# Patient Record
Sex: MALE | NOT HISPANIC OR LATINO | Employment: OTHER | URBAN - NONMETROPOLITAN AREA
[De-identification: names, ages, dates, MRNs, and addresses within clinical notes are randomized per-mention and may not be internally consistent; named-entity substitution may affect disease eponyms.]

---

## 2022-03-25 DIAGNOSIS — B37.0 ORAL THRUSH: ICD-10-CM

## 2022-03-25 DIAGNOSIS — J44.1 COPD WITH EXACERBATION: ICD-10-CM

## 2022-03-25 RX ORDER — FLUCONAZOLE 150 MG/1
150 TABLET ORAL EVERY OTHER DAY
Qty: 3 TABLET | Refills: 0 | Status: SHIPPED | OUTPATIENT
Start: 2022-03-25 | End: 2022-03-25

## 2022-03-25 RX ORDER — PREDNISONE 20 MG/1
20 TABLET ORAL DAILY
Qty: 10 TABLET | Refills: 0 | Status: SHIPPED | OUTPATIENT
Start: 2022-03-25 | End: 2022-05-11 | Stop reason: SDUPTHER

## 2022-04-01 NOTE — TELEPHONE ENCOUNTER
Incoming Refill Request    Fluconazole  Medication requested (name and dose): Fluconazole 2 TABLETS     PREDNIZONE 20MG 10 TABLET    Pharmacy where request should be sent: [facility]    Additional details provided by patient: PATIENT SAID THAT HE ONLY RECEIVE ONE OF HIS FLUCONAZOLE AND HE TOOK IT ON THE 25TH. HE NEEDS TWO MORE.     HE ALSO SAID THAT HE DID NOT RECEIVE HIS PREDNISONE 20MG. HE SAID HE IS GOING TO CHECK WITH THE PHARMACY NOW.    Best call back number: [phone]    Does the patient have less than a 3 day supply:  [x] Yes  [] No    January Desai Rep  04/01/22, 15:56 EDT

## 2022-04-04 RX ORDER — FLUCONAZOLE 150 MG/1
150 TABLET ORAL ONCE
Qty: 3 TABLET | Refills: 0 | Status: SHIPPED | OUTPATIENT
Start: 2022-04-04 | End: 2022-04-04

## 2022-04-04 RX ORDER — PREDNISONE 20 MG/1
20 TABLET ORAL DAILY
Qty: 10 TABLET | Refills: 0 | Status: SHIPPED | OUTPATIENT
Start: 2022-04-04 | End: 2022-04-08 | Stop reason: SDUPTHER

## 2022-04-04 NOTE — TELEPHONE ENCOUNTER
Rx Refill Note    Requested Prescriptions     Pending Prescriptions Disp Refills   • predniSONE (DELTASONE) 20 MG tablet 10 tablet 0     Sig: Take 1 tablet by mouth Daily.   • fluconazole (DIFLUCAN) 150 MG tablet 3 tablet 0     Sig: Take 1 tablet by mouth 1 (One) Time for 1 dose.        Last office visit with prescribing clinician: per blpojhl48/08/21     Next office visit with prescribing clinician: 4/8/2022   Last labs: 03/19/21  Last refill: 02/10/22   Pharmacy (be sure to add in Epic). correct

## 2022-04-08 ENCOUNTER — OFFICE VISIT (OUTPATIENT)
Dept: FAMILY MEDICINE CLINIC | Facility: CLINIC | Age: 67
End: 2022-04-08

## 2022-04-08 VITALS
DIASTOLIC BLOOD PRESSURE: 84 MMHG | WEIGHT: 156.6 LBS | OXYGEN SATURATION: 99 % | HEIGHT: 60 IN | HEART RATE: 86 BPM | SYSTOLIC BLOOD PRESSURE: 134 MMHG | BODY MASS INDEX: 30.74 KG/M2

## 2022-04-08 DIAGNOSIS — R09.02 HYPOXIA: ICD-10-CM

## 2022-04-08 DIAGNOSIS — I10 PRIMARY HYPERTENSION: ICD-10-CM

## 2022-04-08 DIAGNOSIS — K80.20 CALCULUS OF GALLBLADDER WITHOUT CHOLECYSTITIS WITHOUT OBSTRUCTION: ICD-10-CM

## 2022-04-08 DIAGNOSIS — E11.9 TYPE 2 DIABETES MELLITUS WITHOUT COMPLICATION, WITHOUT LONG-TERM CURRENT USE OF INSULIN: ICD-10-CM

## 2022-04-08 DIAGNOSIS — N28.9 RENAL LESION: ICD-10-CM

## 2022-04-08 DIAGNOSIS — Z00.01 ENCOUNTER FOR PREVENTATIVE ADULT HEALTH CARE EXAM WITH ABNORMAL FINDINGS: Primary | ICD-10-CM

## 2022-04-08 DIAGNOSIS — I25.10 CORONARY ARTERY DISEASE INVOLVING NATIVE CORONARY ARTERY OF NATIVE HEART WITHOUT ANGINA PECTORIS: ICD-10-CM

## 2022-04-08 DIAGNOSIS — J43.9 PULMONARY EMPHYSEMA, UNSPECIFIED EMPHYSEMA TYPE: ICD-10-CM

## 2022-04-08 DIAGNOSIS — Z11.59 ENCOUNTER FOR HCV SCREENING TEST FOR LOW RISK PATIENT: ICD-10-CM

## 2022-04-08 PROCEDURE — 99215 OFFICE O/P EST HI 40 MIN: CPT | Performed by: FAMILY MEDICINE

## 2022-04-08 PROCEDURE — 36415 COLL VENOUS BLD VENIPUNCTURE: CPT | Performed by: FAMILY MEDICINE

## 2022-04-08 RX ORDER — ALBUTEROL SULFATE 90 UG/1
AEROSOL, METERED RESPIRATORY (INHALATION)
COMMUNITY
Start: 2022-04-01 | End: 2023-02-17 | Stop reason: SDUPTHER

## 2022-04-08 RX ORDER — GLIPIZIDE 5 MG/1
1 TABLET, FILM COATED, EXTENDED RELEASE ORAL
COMMUNITY
Start: 2022-02-28 | End: 2022-04-08 | Stop reason: SDUPTHER

## 2022-04-08 RX ORDER — FLUCONAZOLE 150 MG/1
TABLET ORAL
COMMUNITY
Start: 2022-04-04 | End: 2022-05-11 | Stop reason: SDUPTHER

## 2022-04-08 RX ORDER — DILTIAZEM HYDROCHLORIDE 240 MG/1
CAPSULE, COATED, EXTENDED RELEASE ORAL
COMMUNITY
Start: 2022-04-01 | End: 2022-11-14 | Stop reason: SDUPTHER

## 2022-04-08 RX ORDER — LOSARTAN POTASSIUM 100 MG/1
TABLET ORAL
COMMUNITY
Start: 2022-04-01 | End: 2022-04-08 | Stop reason: SDUPTHER

## 2022-04-08 RX ORDER — GLIPIZIDE 5 MG/1
5 TABLET, FILM COATED, EXTENDED RELEASE ORAL
Qty: 30 TABLET | Refills: 4 | Status: SHIPPED | OUTPATIENT
Start: 2022-04-08 | End: 2022-09-23

## 2022-04-08 RX ORDER — BUDESONIDE AND FORMOTEROL FUMARATE DIHYDRATE 160; 4.5 UG/1; UG/1
AEROSOL RESPIRATORY (INHALATION)
COMMUNITY
Start: 2022-03-09 | End: 2023-02-17 | Stop reason: SDUPTHER

## 2022-04-08 RX ORDER — LOSARTAN POTASSIUM 100 MG/1
100 TABLET ORAL DAILY
Qty: 30 TABLET | Refills: 3 | Status: SHIPPED | OUTPATIENT
Start: 2022-04-08 | End: 2022-10-25

## 2022-04-08 NOTE — PROGRESS NOTES
Answers for HPI/ROS submitted by the patient on 4/1/2022  What is the primary reason for your visit?: Diabetes    Chief Complaint  Follow-up and Fatigue    Subjective          Iker Barber presents to [facility] for preventative yearly exam.   Patient is a 87-year-old male with stage III/end-stage COPD is chronically on about 3 L of oxygen via nasal cannula he follows with Dr. Lamar from a pulmonology standpoint.  They did do a recent CT of the chest showed stable lung status but did show incidental evidence of gallstones in the right upper quadrant.  He did have a lesion in the kidney which seem to be stable or slightly increased in size.  Patient is continue to have a hard time from respiration standpoint he did smoke for 40 years and is likely having his COPD worsening from his history.  He uses his daily inhalers with his albuterol inhaler as needed and nebulizer machine.  He had been doing really well on the asetazeline that pulmonology had prescribed to him however due to insurance not covering this he was unable to get any more.  He notes that his breathing is the hardest when he first wakes up in the morning because he is unable to get a lot of the congestion and mucus out once he gets moving throughout the day this seems to slowly improve.    Otherwise he has put off a lot of his screening studies due to how bad his COPD and his breathing are.    Diabetes  He has type 2 diabetes mellitus. No MedicAlert identification noted. The initial diagnosis of diabetes was made 2011 years ago. Associated symptoms include polyuria and weakness. Symptoms are stable. Risk factors for coronary artery disease include hypertension. Current diabetic treatment includes oral agent (monotherapy). He is compliant with treatment all of the time. He has not had a previous visit with a dietitian. He monitors blood glucose at home 1-2 x per week. Blood glucose monitoring compliance is good. He  "does not see a podiatrist.Eye exam is not current.       Objective   Vital Signs:   /84   Pulse 86   Ht 59 cm (23.23\")   Wt 71 kg (156 lb 9.6 oz)   SpO2 99%   .07 kg/m²     Body mass index is 204.07 kg/m².    Predictive Model Details   No score data available for Risk of Fall        PHQ-9 Depression Screening  Little interest or pleasure in doing things? 0-->not at all   Feeling down, depressed, or hopeless? 0-->not at all   Trouble falling or staying asleep, or sleeping too much?     Feeling tired or having little energy?     Poor appetite or overeating?     Feeling bad about yourself - or that you are a failure or have let yourself or your family down?     Trouble concentrating on things, such as reading the newspaper or watching television?     Moving or speaking so slowly that other people could have noticed? Or the opposite - being so fidgety or restless that you have been moving around a lot more than usual?     Thoughts that you would be better off dead, or of hurting yourself in some way?     PHQ-9 Total Score 0   If you checked off any problems, how difficult have these problems made it for you to do your work, take care of things at home, or get along with other people?         There is no immunization history on file for this patient.    Review of Systems   Respiratory: Positive for cough, shortness of breath and wheezing.    Endocrine: Positive for polyuria.   Neurological: Positive for weakness.       Past History:  Medical History: has a past medical history of Anxiety, Arthritis, Asthma, Diabetes ([facility]), Elevated blood pressure reading, Emphysema, unspecified ([facility]), Esophageal reflux, Hearing problem, Heart disease, Hypercholesterolemia, Irritable bowel syndrome (IBS), Peripheral artery disease ([facility]), Skin disorder, and Stroke (cerebrum) ([facility]).   Surgical History: has no past surgical history on file.   Family History: family history includes Allergies in his mother; Alzheimer's " disease in his mother; Diabetes in his brother, father, and mother.   Social History: reports that he quit smoking about 9 years ago. His smoking use included cigarettes. He started smoking about 49 years ago. He quit after 40.00 years of use. He has never used smokeless tobacco. He reports previous alcohol use. He reports that he does not use drugs.      Current Outpatient Medications:   •  albuterol sulfate  (90 Base) MCG/ACT inhaler, , Disp: , Rfl:   •  dilTIAZem CD (CARDIZEM CD) 240 MG 24 hr capsule, , Disp: , Rfl:   •  fluconazole (DIFLUCAN) 150 MG tablet, , Disp: , Rfl:   •  glipizide (GLUCOTROL XL) 5 MG ER tablet, Take 1 tablet by mouth Daily With Breakfast., Disp: 30 tablet, Rfl: 4  •  Incruse Ellipta 62.5 MCG/INH aerosol powder , , Disp: , Rfl:   •  losartan (COZAAR) 100 MG tablet, Take 1 tablet by mouth Daily., Disp: 30 tablet, Rfl: 3  •  predniSONE (DELTASONE) 20 MG tablet, Take 1 tablet by mouth Daily., Disp: 10 tablet, Rfl: 0  •  Symbicort 160-4.5 MCG/ACT inhaler, , Disp: , Rfl:     Allergies: Patient has no known allergies.    Physical Exam  Vitals (Resting comfortably on 3 L of oxygen via nasal cannula) reviewed.   Constitutional:       Appearance: Normal appearance. He is normal weight.   HENT:      Head: Normocephalic and atraumatic.   Eyes:      Pupils: Pupils are equal, round, and reactive to light.   Cardiovascular:      Rate and Rhythm: Normal rate and regular rhythm.      Pulses: Normal pulses.      Heart sounds: Normal heart sounds.   Pulmonary:      Effort: Pulmonary effort is normal.   Musculoskeletal:      Cervical back: Normal range of motion and neck supple.   Skin:     General: Skin is warm and dry.   Neurological:      General: No focal deficit present.      Mental Status: He is alert. Mental status is at baseline.   Psychiatric:         Mood and Affect: Mood normal.         Behavior: Behavior normal.         Thought Content: Thought content normal.         Judgment: Judgment  normal.          Result Review :              Counseling/anticipatory guidance:  Patient has been counseled on nutrition, [place] planning/contraception, physical activity, health and weight, injury prevention, misuse of tobacco, alcohol and drugs, sexual behavior, dental health, mental health, immunizations and screening.  Patient has been given counseling and guidance on the importance of breast cancer and self breast exams.     Assessment and Plan    Diagnoses and all orders for this visit:    1. Encounter for preventative adult health care exam with abnormal findings (Primary)  We will get basic blood work and follow-up he does not want to pursue any screening studies at this time due to his underlying conditions  -     CBC Auto Differential; Future  -     CK; Future  -     Comprehensive Metabolic Panel; Future  -     Hemoglobin A1c; Future  -     Lipid Panel; Future  -     Lipid Panel  -     Hemoglobin A1c  -     Comprehensive Metabolic Panel  -     CK  -     CBC Auto Differential    2. Pulmonary emphysema, unspecified emphysema type (HCC)  -For now COPD is where it has been in the past.  His oxygen requirements have not increased I recommend he continue to follow along with pulmonology with his current inhaler use.  We will continue to follow along for as needed as needed flareups.    3. Type 2 diabetes mellitus without complication, without long-term current use of insulin (HCC)  Comments:  Stable for now continue meds we will recheck A1c    4. Primary hypertension  Comments:  We will continue meds and monitoring      5. Hypoxia  Stable continue to wear nasal cannula oxygen    6. Coronary artery disease involving native coronary artery of native heart without angina pectoris  He is currently only on aspirin has discontinued Plavix no recent cardiac symptoms noted.    7. Encounter for HCV screening test for low risk patient  -     Hepatitis C antibody; Future  -     Hepatitis C antibody    8. Renal  lesion  Comments:  Stable renal lesion seen on recent CT scan    9. Calculus of gallbladder without cholecystitis without obstruction  This was noted to be an incidental finding on a recent CT.  He notes no abdominal pain with no nausea or vomiting.  We have had a discussion with the gallstones daily solution really is removal however he is high risk and they were unable to extubate him if he was intubated for surgery he has been told to watch for risk factors and symptoms of possible acute cholecystitis which would be a surgical emergency.  Otherwise diet provided.    Other orders  -     glipizide (GLUCOTROL XL) 5 MG ER tablet; Take 1 tablet by mouth Daily With Breakfast.  Dispense: 30 tablet; Refill: 4  -     losartan (COZAAR) 100 MG tablet; Take 1 tablet by mouth Daily.  Dispense: 30 tablet; Refill: 3    -On a CT abdomen pelvis done on 4/20/2021 there is an indeterminate lesion within the right kidney measuring 14 mm at that time due to his other health issues we did not pursue CT contrast with renal protocol.Answers for HPI/ROS submitted by the patient on 4/1/2022  What is the primary reason for your visit?: Diabetes      MEDICATION SIDE EFFECTS, RISKS AND SIDE EFFECTS HAVE BEEN DISCUSSED WITH PATIENT. PATIENT NOTES UNDERSTANDING. IF ANY CONCERN OR QUESTION REGARDING MEDICATION PLEASE CONTACT.     I spent 60 minutes caring for Iker on this date of service. This time includes time spent by me in the following activities:preparing for the visit, reviewing tests, obtaining and/or reviewing a separately obtained history, ordering medications, tests, or procedures, documenting information in the medical record, independently interpreting results and communicating that information with the patient/family/caregiver and care coordination     Follow Up   Return in about 6 months (around 10/8/2022).  Patient was given instructions and counseling regarding his condition or for health maintenance advice. Please see  specific information pulled into the AVS if appropriate.     Jody Gaspar DO

## 2022-04-09 LAB
ALBUMIN SERPL-MCNC: 4.7 G/DL (ref 3.8–4.8)
ALBUMIN/GLOB SERPL: 2 {RATIO} (ref 1.2–2.2)
ALP SERPL-CCNC: 96 IU/L (ref 44–121)
ALT SERPL-CCNC: 24 IU/L (ref 0–44)
AST SERPL-CCNC: 21 IU/L (ref 0–40)
BASOPHILS # BLD AUTO: 0.1 X10E3/UL (ref 0–0.2)
BASOPHILS NFR BLD AUTO: 1 %
BILIRUB SERPL-MCNC: 0.2 MG/DL (ref 0–1.2)
BUN SERPL-MCNC: 17 MG/DL (ref 8–27)
BUN/CREAT SERPL: 19 (ref 10–24)
CALCIUM SERPL-MCNC: 9.5 MG/DL (ref 8.6–10.2)
CHLORIDE SERPL-SCNC: 102 MMOL/L (ref 96–106)
CHOLEST SERPL-MCNC: 211 MG/DL (ref 100–199)
CK SERPL-CCNC: 152 U/L (ref 41–331)
CO2 SERPL-SCNC: 27 MMOL/L (ref 20–29)
CREAT SERPL-MCNC: 0.88 MG/DL (ref 0.76–1.27)
EGFRCR SERPLBLD CKD-EPI 2021: 94 ML/MIN/1.73
EOSINOPHIL # BLD AUTO: 0 X10E3/UL (ref 0–0.4)
EOSINOPHIL NFR BLD AUTO: 0 %
ERYTHROCYTE [DISTWIDTH] IN BLOOD BY AUTOMATED COUNT: 11.9 % (ref 11.6–15.4)
GLOBULIN SER CALC-MCNC: 2.3 G/DL (ref 1.5–4.5)
GLUCOSE SERPL-MCNC: 91 MG/DL (ref 65–99)
HBA1C MFR BLD: 7.1 % (ref 4.8–5.6)
HCT VFR BLD AUTO: 43.7 % (ref 37.5–51)
HCV AB S/CO SERPL IA: <0.1 S/CO RATIO (ref 0–0.9)
HDLC SERPL-MCNC: 57 MG/DL
HGB BLD-MCNC: 15.8 G/DL (ref 13–17.7)
IMM GRANULOCYTES # BLD AUTO: 0 X10E3/UL (ref 0–0.1)
IMM GRANULOCYTES NFR BLD AUTO: 0 %
LDLC SERPL CALC-MCNC: 102 MG/DL (ref 0–99)
LYMPHOCYTES # BLD AUTO: 2.3 X10E3/UL (ref 0.7–3.1)
LYMPHOCYTES NFR BLD AUTO: 23 %
MCH RBC QN AUTO: 34.8 PG (ref 26.6–33)
MCHC RBC AUTO-ENTMCNC: 36.2 G/DL (ref 31.5–35.7)
MCV RBC AUTO: 96 FL (ref 79–97)
MONOCYTES # BLD AUTO: 0.8 X10E3/UL (ref 0.1–0.9)
MONOCYTES NFR BLD AUTO: 8 %
NEUTROPHILS # BLD AUTO: 6.9 X10E3/UL (ref 1.4–7)
NEUTROPHILS NFR BLD AUTO: 68 %
PLATELET # BLD AUTO: 280 X10E3/UL (ref 150–450)
POTASSIUM SERPL-SCNC: 3.9 MMOL/L (ref 3.5–5.2)
PROT SERPL-MCNC: 7 G/DL (ref 6–8.5)
RBC # BLD AUTO: 4.54 X10E6/UL (ref 4.14–5.8)
SODIUM SERPL-SCNC: 144 MMOL/L (ref 134–144)
TRIGL SERPL-MCNC: 307 MG/DL (ref 0–149)
VLDLC SERPL CALC-MCNC: 52 MG/DL (ref 5–40)
WBC # BLD AUTO: 10.1 X10E3/UL (ref 3.4–10.8)

## 2022-05-11 DIAGNOSIS — J44.1 COPD WITH EXACERBATION: ICD-10-CM

## 2022-05-11 RX ORDER — PREDNISONE 20 MG/1
20 TABLET ORAL DAILY
Qty: 10 TABLET | Refills: 0 | Status: SHIPPED | OUTPATIENT
Start: 2022-05-11 | End: 2022-05-11

## 2022-05-11 RX ORDER — FLUCONAZOLE 150 MG/1
150 TABLET ORAL DAILY
Qty: 3 TABLET | Refills: 2 | Status: SHIPPED | OUTPATIENT
Start: 2022-05-11 | End: 2022-07-11 | Stop reason: SDUPTHER

## 2022-05-11 NOTE — TELEPHONE ENCOUNTER
Rx Refill Note    Requested Prescriptions     Pending Prescriptions Disp Refills   • predniSONE (DELTASONE) 20 MG tablet [Pharmacy Med Name: predniSONE 20 MG TABLET] 10 tablet 0     Sig: TAKE ONE TABLET BY MOUTH DAILY        Last office visit with prescribing clinician: 4/8/2022      Next office visit with prescribing clinician: Visit date not found   Last labs:   Last refill: appears it was printed instead of escribed?   Pharmacy (be sure to add in Epic). correct

## 2022-05-12 RX ORDER — PREDNISONE 20 MG/1
TABLET ORAL
Qty: 10 TABLET | Refills: 0 | Status: SHIPPED | OUTPATIENT
Start: 2022-05-12 | End: 2022-06-14

## 2022-06-13 DIAGNOSIS — J44.1 COPD WITH EXACERBATION: ICD-10-CM

## 2022-06-14 RX ORDER — PREDNISONE 20 MG/1
TABLET ORAL
Qty: 10 TABLET | Refills: 0 | Status: SHIPPED | OUTPATIENT
Start: 2022-06-14 | End: 2022-07-11 | Stop reason: SDUPTHER

## 2022-06-14 NOTE — TELEPHONE ENCOUNTER
Rx Refill Note    Requested Prescriptions     Pending Prescriptions Disp Refills   • predniSONE (DELTASONE) 20 MG tablet [Pharmacy Med Name: predniSONE 20 MG TABLET] 10 tablet 0     Sig: TAKE ONE TABLET BY MOUTH DAILY        Last office visit with prescribing clinician: 4/8/2022      Next office visit with prescribing clinician: Visit date not found   Last labs:   Last refill: 05/10/2022   Pharmacy (be sure to add in Epic). correct

## 2022-07-05 ENCOUNTER — TELEPHONE (OUTPATIENT)
Dept: FAMILY MEDICINE CLINIC | Facility: CLINIC | Age: 67
End: 2022-07-05

## 2022-07-05 NOTE — TELEPHONE ENCOUNTER
Pt's wife called regis her  would be squeezed into Dr. Gaspar's schedule if he was ill. She has no openings this week. So I told her I would Message her.   [phone]

## 2022-07-05 NOTE — TELEPHONE ENCOUNTER
We can get patient from my schedule any day this week we will book him it is important to get him he has end-stage COPD

## 2022-07-11 ENCOUNTER — OFFICE VISIT (OUTPATIENT)
Dept: FAMILY MEDICINE CLINIC | Facility: CLINIC | Age: 67
End: 2022-07-11

## 2022-07-11 VITALS
DIASTOLIC BLOOD PRESSURE: 78 MMHG | HEART RATE: 88 BPM | WEIGHT: 149.8 LBS | BODY MASS INDEX: 29.41 KG/M2 | OXYGEN SATURATION: 95 % | SYSTOLIC BLOOD PRESSURE: 120 MMHG | HEIGHT: 60 IN

## 2022-07-11 DIAGNOSIS — J44.9 END STAGE COPD: Primary | ICD-10-CM

## 2022-07-11 DIAGNOSIS — J44.1 COPD WITH EXACERBATION: ICD-10-CM

## 2022-07-11 DIAGNOSIS — R60.0 BILATERAL LOWER EXTREMITY EDEMA: ICD-10-CM

## 2022-07-11 PROCEDURE — 99214 OFFICE O/P EST MOD 30 MIN: CPT | Performed by: FAMILY MEDICINE

## 2022-07-11 RX ORDER — AZITHROMYCIN 250 MG/1
TABLET, FILM COATED ORAL
COMMUNITY
Start: 2022-05-23 | End: 2023-02-17

## 2022-07-11 RX ORDER — PREDNISONE 20 MG/1
20 TABLET ORAL DAILY
Qty: 30 TABLET | Refills: 0 | Status: SHIPPED | OUTPATIENT
Start: 2022-07-11 | End: 2022-08-10

## 2022-07-11 RX ORDER — FUROSEMIDE 40 MG/1
40 TABLET ORAL DAILY
Qty: 30 TABLET | Refills: 0 | Status: SHIPPED | OUTPATIENT
Start: 2022-07-11 | End: 2022-08-08

## 2022-07-11 RX ORDER — FLUCONAZOLE 150 MG/1
150 TABLET ORAL DAILY
Qty: 3 TABLET | Refills: 2 | Status: SHIPPED | OUTPATIENT
Start: 2022-07-11 | End: 2022-09-29

## 2022-07-11 RX ORDER — POTASSIUM CHLORIDE 750 MG/1
10 TABLET, EXTENDED RELEASE ORAL DAILY
Qty: 30 TABLET | Refills: 2 | Status: SHIPPED | OUTPATIENT
Start: 2022-07-11 | End: 2022-10-07

## 2022-07-11 NOTE — PROGRESS NOTES
"Chief Complaint  Edema and COPD    Subjective          Iker Barber presents to [facility]  Patient is a 67-year-old male with end-stage COPD currently on 3 to 4 L of oxygen at all times.  He does follow appropriately with the pulmonologist however they basically told him that there is not much more they can do for him from a lung standpoint.  He was previously on a asetazline for his lungs this is really helping him as an expectorant however they have significantly marked up the price and he is not able to afford it now.  He still has a vibrating chest vest to help with cough and congestion.  He is here today because he is sent his trilogy BiPAP machine back as his COPD symptoms seem to get worse with the use of this.  He feels like his respiratory symptoms and his breathing is able to be managed by daily prednisone.  He states that this time the risks of prednisone use versus the benefits of what he states is helping keep him alive it is more valuable to him.  He is requesting consistent prednisone prescription so he can stay on it to help from a wheezing standpoint.    He had also at 1 point been put on Lasix.  It seemed to help him from a bilateral lower extremity edema standpoint he is noticing some increased swelling.  He does not want to pursue anything like an echo he is aware that it could be his pulmonary status causing cardiac issues.  He would like to see if he can get an as needed Lasix to help with some diuretic properties.  He denies any actual chest pain.  Oxygen requirements are about the same.      Objective   Vital Signs:   /78   Pulse 88   Ht 59 cm (23.23\")   Wt 67.9 kg (149 lb 12.8 oz)   SpO2 95%   .21 kg/m²     Body mass index is 195.21 kg/m².    Review of Systems   Constitutional: Negative.    HENT: Negative.    Eyes: Negative.    Respiratory: Negative.    Cardiovascular: Negative.    Gastrointestinal: Negative.    Endocrine: Negative.  "   Genitourinary: Negative.    Musculoskeletal: Negative.    Skin: Negative.    Allergic/Immunologic: Negative.    Neurological: Negative.    Hematological: Negative.    Psychiatric/Behavioral: Negative.        Past History:  Medical History: has a past medical history of Anxiety, Arthritis, Asthma, Diabetes ([facility]), Elevated blood pressure reading, Emphysema, unspecified (HCC), Esophageal reflux, Hearing problem, Heart disease, Hypercholesterolemia, Irritable bowel syndrome (IBS), Peripheral artery disease (HCC), Skin disorder, and Stroke (cerebrum) ([facility]).   Surgical History: has no past surgical history on file.   Family History: family history includes Allergies in his mother; Alzheimer's disease in his mother; Diabetes in his brother, father, and mother.   Social History: reports that he quit smoking about 9 years ago. His smoking use included cigarettes. He started smoking about 49 years ago. He quit after 40.00 years of use. He has never used smokeless tobacco. He reports previous alcohol use. He reports that he does not use drugs.      Current Outpatient Medications:   •  fluconazole (DIFLUCAN) 150 MG tablet, Take 1 tablet by mouth Daily., Disp: 3 tablet, Rfl: 2  •  predniSONE (DELTASONE) 20 MG tablet, Take 1 tablet by mouth Daily., Disp: 30 tablet, Rfl: 0  •  albuterol sulfate  (90 Base) MCG/ACT inhaler, , Disp: , Rfl:   •  azithromycin (ZITHROMAX) 250 MG tablet, , Disp: , Rfl:   •  dilTIAZem CD (CARDIZEM CD) 240 MG 24 hr capsule, , Disp: , Rfl:   •  furosemide (Lasix) 40 MG tablet, Take 1 tablet by mouth Daily., Disp: 30 tablet, Rfl: 0  •  glipizide (GLUCOTROL XL) 5 MG ER tablet, Take 1 tablet by mouth Daily With Breakfast., Disp: 30 tablet, Rfl: 4  •  Incruse Ellipta 62.5 MCG/INH aerosol powder , , Disp: , Rfl:   •  losartan (COZAAR) 100 MG tablet, Take 1 tablet by mouth Daily., Disp: 30 tablet, Rfl: 3  •  potassium chloride (K-DUR,KLOR-CON) 10 MEQ CR tablet, Take 1 tablet by mouth Daily., Disp: 30  tablet, Rfl: 2  •  Symbicort 160-4.5 MCG/ACT inhaler, , Disp: , Rfl:     Allergies: Patient has no known allergies.    Physical Exam  Constitutional:       Appearance: Normal appearance. He is normal weight.   HENT:      Head: Normocephalic and atraumatic.   Eyes:      Pupils: Pupils are equal, round, and reactive to light.   Cardiovascular:      Rate and Rhythm: Normal rate and regular rhythm.      Pulses: Normal pulses.      Heart sounds: Normal heart sounds.   Pulmonary:      Comments: On 3 L of oxygen no evidence of respiratory distress   Musculoskeletal:      Cervical back: Normal range of motion and neck supple.   Skin:     General: Skin is warm and dry.   Neurological:      General: No focal deficit present.      Mental Status: He is alert. Mental status is at baseline.   Psychiatric:         Mood and Affect: Mood normal.         Behavior: Behavior normal.         Thought Content: Thought content normal.         Judgment: Judgment normal.          Result Review :                   Assessment and Plan    Diagnoses and all orders for this visit:    1. End stage COPD (HCC) (Primary)  I have discussed in length with patient at this time he is not wanting to get hospice involved but he notes at some point in the near future he may consider this.  As his pulmonologist is only seeing him every 6 months and is stated there is nothing more he can do for him he states the prednisone daily is the only thing that allows him to breathe I am more than happy to provide this for him along with any other medications he needs to keep him comfortable.      2. COPD with exacerbation (HCC)  -     predniSONE (DELTASONE) 20 MG tablet; Take 1 tablet by mouth Daily.  Dispense: 30 tablet; Refill: 0    3. Bilateral lower extremity edema  I will add the as needed Lasix for him to take he has been told to take this with the potassium of course if any worsening symptoms return for evaluation patient voices full understanding    Other  orders  -     fluconazole (DIFLUCAN) 150 MG tablet; Take 1 tablet by mouth Daily.  Dispense: 3 tablet; Refill: 2  -     furosemide (Lasix) 40 MG tablet; Take 1 tablet by mouth Daily.  Dispense: 30 tablet; Refill: 0  -     potassium chloride (K-DUR,KLOR-CON) 10 MEQ CR tablet; Take 1 tablet by mouth Daily.  Dispense: 30 tablet; Refill: 2      MEDICATION SIDE EFFECTS, RISKS AND SIDE EFFECTS HAVE BEEN DISCUSSED WITH PATIENT. PATIENT NOTES UNDERSTANDING. IF ANY CONCERN OR QUESTION REGARDING MEDICATION PLEASE CONTACT.         Follow Up   No follow-ups on file.  Patient was given instructions and counseling regarding his condition or for health maintenance advice. Please see specific information pulled into the AVS if appropriate.     Jody Gaspar DO

## 2022-08-08 RX ORDER — FUROSEMIDE 40 MG/1
TABLET ORAL
Qty: 30 TABLET | Refills: 0 | Status: SHIPPED | OUTPATIENT
Start: 2022-08-08 | End: 2022-09-06

## 2022-08-08 NOTE — TELEPHONE ENCOUNTER
Rx Refill Note    Requested Prescriptions     Pending Prescriptions Disp Refills   • furosemide (LASIX) 40 MG tablet [Pharmacy Med Name: FUROSEMIDE 40 MG TABLET] 30 tablet 0     Sig: TAKE ONE TABLET BY MOUTH DAILY        Last office visit with prescribing clinician: 7/11/2022      Next office visit with prescribing clinician: Visit date not found   Last labs:   Last refill: 07/11/2022   Pharmacy (be sure to add in Epic). correct

## 2022-08-09 DIAGNOSIS — J44.1 COPD WITH EXACERBATION: ICD-10-CM

## 2022-08-10 RX ORDER — PREDNISONE 20 MG/1
TABLET ORAL
Qty: 30 TABLET | Refills: 0 | Status: SHIPPED | OUTPATIENT
Start: 2022-08-10 | End: 2022-09-06

## 2022-09-05 DIAGNOSIS — J44.1 COPD WITH EXACERBATION: ICD-10-CM

## 2022-09-06 RX ORDER — FUROSEMIDE 40 MG/1
TABLET ORAL
Qty: 30 TABLET | Refills: 0 | Status: SHIPPED | OUTPATIENT
Start: 2022-09-06 | End: 2022-10-04

## 2022-09-06 RX ORDER — PREDNISONE 20 MG/1
TABLET ORAL
Qty: 30 TABLET | Refills: 0 | Status: SHIPPED | OUTPATIENT
Start: 2022-09-06 | End: 2022-10-10

## 2022-09-06 NOTE — TELEPHONE ENCOUNTER
Rx Refill Note    Requested Prescriptions     Pending Prescriptions Disp Refills   • furosemide (LASIX) 40 MG tablet [Pharmacy Med Name: FUROSEMIDE 40 MG TABLET] 30 tablet 0     Sig: TAKE ONE TABLET BY MOUTH DAILY        Last office visit with prescribing clinician: 7/11/2022      Next office visit with prescribing clinician: 9/5/2022   Last labs:   Last refill: 08/08/2022   Pharmacy (be sure to add in Epic). correct

## 2022-09-06 NOTE — TELEPHONE ENCOUNTER
Rx Refill Note    Requested Prescriptions     Pending Prescriptions Disp Refills   • predniSONE (DELTASONE) 20 MG tablet [Pharmacy Med Name: predniSONE 20 MG TABLET] 30 tablet 0     Sig: TAKE ONE TABLET BY MOUTH DAILY        Last office visit with prescribing clinician: 7/11/2022      Next office visit with prescribing clinician: Visit date not found   Last labs:   Last refill: 08/10/2022   Pharmacy (be sure to add in Epic). correct

## 2022-09-23 NOTE — TELEPHONE ENCOUNTER
Rx Refill Note    Requested Prescriptions     Pending Prescriptions Disp Refills   • glipizide (GLUCOTROL XL) 5 MG ER tablet [Pharmacy Med Name: glipiZIDE XL 5 MG TABLET] 30 tablet 1     Sig: TAKE ONE TABLET BY MOUTH DAILY WITH BREAKFAST        Last office visit with prescribing clinician: 7/11/2022      Next office visit with prescribing clinician: Visit date not found   Last labs:   Last refill: 04/08/2022   Pharmacy (be sure to add in Epic). correct

## 2022-09-26 RX ORDER — GLIPIZIDE 5 MG/1
TABLET, FILM COATED, EXTENDED RELEASE ORAL
Qty: 30 TABLET | Refills: 1 | Status: SHIPPED | OUTPATIENT
Start: 2022-09-26 | End: 2023-02-14

## 2022-09-29 RX ORDER — FLUCONAZOLE 150 MG/1
TABLET ORAL
Qty: 3 TABLET | Refills: 0 | Status: SHIPPED | OUTPATIENT
Start: 2022-09-29 | End: 2022-10-24

## 2022-09-29 NOTE — TELEPHONE ENCOUNTER
Rx Refill Note    Requested Prescriptions     Pending Prescriptions Disp Refills   • fluconazole (DIFLUCAN) 150 MG tablet [Pharmacy Med Name: FLUCONAZOLE 150 MG TABLET] 3 tablet 0     Sig: TAKE ONE TABLET BY MOUTH DAILY        Last office visit with prescribing clinician: 7/11/2022      Next office visit with prescribing clinician: Visit date not found   Last labs:   Last refill: 07/11/2022   Pharmacy (be sure to add in Epic). correct

## 2022-10-04 RX ORDER — FUROSEMIDE 40 MG/1
TABLET ORAL
Qty: 30 TABLET | Refills: 0 | Status: SHIPPED | OUTPATIENT
Start: 2022-10-04 | End: 2022-11-14 | Stop reason: SDUPTHER

## 2022-10-04 NOTE — TELEPHONE ENCOUNTER
Rx Refill Note    Requested Prescriptions     Pending Prescriptions Disp Refills   • furosemide (LASIX) 40 MG tablet [Pharmacy Med Name: FUROSEMIDE 40 MG TABLET] 30 tablet 0     Sig: TAKE ONE TABLET BY MOUTH DAILY        Last office visit with prescribing clinician: 7/11/2022      Next office visit with prescribing clinician: Visit date not found   Last labs:   Last refill: 09/06/2022   Pharmacy (be sure to add in Epic). correct

## 2022-10-07 DIAGNOSIS — R60.0 BILATERAL LOWER EXTREMITY EDEMA: Primary | ICD-10-CM

## 2022-10-07 RX ORDER — POTASSIUM CHLORIDE 750 MG/1
TABLET, EXTENDED RELEASE ORAL
Qty: 30 TABLET | Refills: 2 | Status: SHIPPED | OUTPATIENT
Start: 2022-10-07 | End: 2023-01-03

## 2022-10-08 DIAGNOSIS — J44.1 COPD WITH EXACERBATION: ICD-10-CM

## 2022-10-10 RX ORDER — PREDNISONE 20 MG/1
TABLET ORAL
Qty: 30 TABLET | Refills: 0 | Status: SHIPPED | OUTPATIENT
Start: 2022-10-10 | End: 2022-11-08

## 2022-10-24 RX ORDER — FLUCONAZOLE 150 MG/1
TABLET ORAL
Qty: 3 TABLET | Refills: 0 | Status: SHIPPED | OUTPATIENT
Start: 2022-10-24 | End: 2022-11-08

## 2022-10-24 NOTE — TELEPHONE ENCOUNTER
Rx Refill Note    Requested Prescriptions     Pending Prescriptions Disp Refills   • fluconazole (DIFLUCAN) 150 MG tablet [Pharmacy Med Name: FLUCONAZOLE 150 MG TABLET] 3 tablet 0     Sig: TAKE ONE TABLET BY MOUTH DAILY        Last office visit with prescribing clinician: 7/11/2022      Next office visit with prescribing clinician: Visit date not found   Last labs:   Last refill: 09/29/2022   Pharmacy (be sure to add in Epic). correct

## 2022-10-25 DIAGNOSIS — I10 PRIMARY HYPERTENSION: Primary | ICD-10-CM

## 2022-10-26 RX ORDER — LOSARTAN POTASSIUM 100 MG/1
TABLET ORAL
Qty: 90 TABLET | Refills: 0 | Status: SHIPPED | OUTPATIENT
Start: 2022-10-26 | End: 2023-01-23

## 2022-11-08 DIAGNOSIS — J44.1 COPD WITH EXACERBATION: ICD-10-CM

## 2022-11-08 RX ORDER — PREDNISONE 20 MG/1
TABLET ORAL
Qty: 30 TABLET | Refills: 0 | Status: SHIPPED | OUTPATIENT
Start: 2022-11-08 | End: 2022-11-17 | Stop reason: SDUPTHER

## 2022-11-08 RX ORDER — FLUCONAZOLE 150 MG/1
TABLET ORAL
Qty: 3 TABLET | Refills: 0 | Status: SHIPPED | OUTPATIENT
Start: 2022-11-08 | End: 2022-11-17

## 2022-11-08 NOTE — TELEPHONE ENCOUNTER
Rx Refill Note    Requested Prescriptions     Pending Prescriptions Disp Refills   • fluconazole (DIFLUCAN) 150 MG tablet [Pharmacy Med Name: FLUCONAZOLE 150 MG TABLET] 3 tablet 0     Sig: TAKE ONE TABLET BY MOUTH DAILY        Last office visit with prescribing clinician: 7/11/2022      Next office visit with prescribing clinician: Visit date not found   Last labs:   Last refill: 10/24/2022   Pharmacy (be sure to add in Epic). correct

## 2022-11-08 NOTE — TELEPHONE ENCOUNTER
Rx Refill Note    Requested Prescriptions     Pending Prescriptions Disp Refills   • predniSONE (DELTASONE) 20 MG tablet [Pharmacy Med Name: predniSONE 20 MG TABLET] 30 tablet 0     Sig: TAKE ONE TABLET BY MOUTH DAILY        Last office visit with prescribing clinician: 7/11/2022      Next office visit with prescribing clinician: 11/8/2022   Last labs:   Last refill: 10/10/2022   Pharmacy (be sure to add in Epic). correct

## 2022-11-14 NOTE — TELEPHONE ENCOUNTER
Caller: Iker Barber    Relationship: Self    Best call back number: [phone]    Requested Prescriptions:   Requested Prescriptions     Pending Prescriptions Disp Refills   • furosemide (LASIX) 40 MG tablet 30 tablet 0     Sig: Take 1 tablet by mouth Daily.   • dilTIAZem CD (CARDIZEM CD) 240 MG 24 hr capsule          Pharmacy where request should be sent: [facility] [id number] - [place], [place] - [address] AT [address] [facility] [phone] [facility] [phone] FX     Additional details provided by patient:   PATIENT IS COMPLETELY OUT OF MEDICATION AND WOULD LIKE FOR A 90 DAY SUPPLIES ON BOTH MEDICATION TO BE CALLED INTO THE PHARMACY     Does the patient have less than a 3 day supply:  [x] Yes  [] No    January Martin Rep   11/14/22 10:27 EST

## 2022-11-15 RX ORDER — FUROSEMIDE 40 MG/1
40 TABLET ORAL DAILY
Qty: 90 TABLET | Refills: 0 | Status: SHIPPED | OUTPATIENT
Start: 2022-11-15 | End: 2022-11-17 | Stop reason: SDUPTHER

## 2022-11-15 RX ORDER — DILTIAZEM HYDROCHLORIDE 240 MG/1
240 CAPSULE, COATED, EXTENDED RELEASE ORAL DAILY
Qty: 90 CAPSULE | Refills: 0 | Status: SHIPPED | OUTPATIENT
Start: 2022-11-15 | End: 2023-02-14

## 2022-11-17 ENCOUNTER — OFFICE VISIT (OUTPATIENT)
Dept: FAMILY MEDICINE CLINIC | Facility: CLINIC | Age: 67
End: 2022-11-17

## 2022-11-17 VITALS
WEIGHT: 145.2 LBS | HEIGHT: 69 IN | HEART RATE: 96 BPM | DIASTOLIC BLOOD PRESSURE: 70 MMHG | OXYGEN SATURATION: 94 % | BODY MASS INDEX: 21.51 KG/M2 | SYSTOLIC BLOOD PRESSURE: 120 MMHG

## 2022-11-17 DIAGNOSIS — L73.9 FOLLICULITIS: ICD-10-CM

## 2022-11-17 DIAGNOSIS — R06.02 SHORTNESS OF BREATH: ICD-10-CM

## 2022-11-17 DIAGNOSIS — B37.0 ORAL THRUSH: ICD-10-CM

## 2022-11-17 DIAGNOSIS — J98.4 CHRONIC LUNG DISEASE: ICD-10-CM

## 2022-11-17 DIAGNOSIS — J44.1 COPD WITH EXACERBATION: Primary | ICD-10-CM

## 2022-11-17 PROCEDURE — 99214 OFFICE O/P EST MOD 30 MIN: CPT | Performed by: FAMILY MEDICINE

## 2022-11-17 RX ORDER — AMOXICILLIN AND CLAVULANATE POTASSIUM 875; 125 MG/1; MG/1
1 TABLET, FILM COATED ORAL 2 TIMES DAILY
Qty: 14 TABLET | Refills: 0 | Status: SHIPPED | OUTPATIENT
Start: 2022-11-17 | End: 2023-02-17

## 2022-11-17 RX ORDER — FUROSEMIDE 40 MG/1
40 TABLET ORAL DAILY
Qty: 90 TABLET | Refills: 0 | Status: SHIPPED | OUTPATIENT
Start: 2022-11-17 | End: 2023-02-17 | Stop reason: SDUPTHER

## 2022-11-17 RX ORDER — RIVAROXABAN 20 MG/1
TABLET, FILM COATED ORAL
COMMUNITY
Start: 2022-11-04

## 2022-11-17 RX ORDER — AZITHROMYCIN 250 MG/1
TABLET, FILM COATED ORAL
Status: CANCELLED | OUTPATIENT
Start: 2022-11-17

## 2022-11-17 RX ORDER — NITROGLYCERIN 0.4 MG/1
TABLET SUBLINGUAL
COMMUNITY
Start: 2022-10-06 | End: 2023-02-17 | Stop reason: SDUPTHER

## 2022-11-17 RX ORDER — FLUCONAZOLE 150 MG/1
150 TABLET ORAL DAILY
Qty: 5 TABLET | Refills: 5 | Status: SHIPPED | OUTPATIENT
Start: 2022-11-17 | End: 2023-02-17 | Stop reason: SDUPTHER

## 2022-11-17 RX ORDER — PREDNISONE 20 MG/1
20 TABLET ORAL DAILY
Qty: 90 TABLET | Refills: 0 | Status: SHIPPED | OUTPATIENT
Start: 2022-11-17 | End: 2023-02-17 | Stop reason: SDUPTHER

## 2022-11-17 NOTE — PROGRESS NOTES
"Chief Complaint  Diabetes, Hypertension, and Boils    Subjective          Iker Barber presents to [facility]  History of Present Illness  Patient is a 67-year-old male with end-stage COPD currently on 3 L of chronic oxygen use who presents for follow-up examination.  He is already seen pulmonology who was basically told him there is not much more he can have done for his symptoms.  He notes that the intermittent use of prednisone seems to help keep his respiratory symptoms stable amongst the nebulizer solution.    He intermittently gets these boils like folliculitis lesions in the groin area which he is requesting as needed antibiotics for    He also due to his inhaler use gets frequent oral thrush would like to see if we can keep a continuous Diflucan prescription for him as this is the only thing that seems to help       Objective   Vital Signs:   /70 (BP Location: Right arm, Patient Position: Sitting, Cuff Size: Adult)   Pulse 96   Ht 175.3 cm (69\")   Wt 65.9 kg (145 lb 3.2 oz)   SpO2 94%   BMI 21.44 kg/m²     Body mass index is 21.44 kg/m².    Review of Systems   Constitutional: Negative.    HENT: Negative.    Eyes: Negative.    Respiratory: Positive for cough, shortness of breath and wheezing.    Cardiovascular: Negative.    Gastrointestinal: Negative.    Endocrine: Negative.    Genitourinary: Negative.    Musculoskeletal: Negative.    Skin: Positive for skin lesions.   Allergic/Immunologic: Negative.    Neurological: Negative.    Hematological: Negative.    Psychiatric/Behavioral: Negative.        Past History:  Medical History: has a past medical history of Anxiety, Arthritis, Asthma, Diabetes ([facility]), Elevated blood pressure reading, Emphysema, unspecified ([facility]), Esophageal reflux, Hearing problem, Heart disease, Hypercholesterolemia, Irritable bowel syndrome (IBS), Peripheral artery disease ([facility]), Skin disorder, and Stroke (cerebrum) ([facility]).   Surgical History: " has no past surgical history on file.   Family History: family history includes Allergies in his mother; Alzheimer's disease in his mother; Diabetes in his brother, father, and mother.   Social History: reports that he quit smoking about 9 years ago. His smoking use included cigarettes. He started smoking about 49 years ago. He has never used smokeless tobacco. He reports that he does not currently use alcohol. He reports that he does not use drugs.      Current Outpatient Medications:   •  albuterol sulfate  (90 Base) MCG/ACT inhaler, , Disp: , Rfl:   •  azithromycin (ZITHROMAX) 250 MG tablet, , Disp: , Rfl:   •  dilTIAZem CD (CARDIZEM CD) 240 MG 24 hr capsule, Take 1 capsule by mouth Daily., Disp: 90 capsule, Rfl: 0  •  fluconazole (DIFLUCAN) 150 MG tablet, Take 1 tablet by mouth Daily., Disp: 5 tablet, Rfl: 5  •  furosemide (LASIX) 40 MG tablet, Take 1 tablet by mouth Daily., Disp: 90 tablet, Rfl: 0  •  glipizide (GLUCOTROL XL) 5 MG ER tablet, TAKE ONE TABLET BY MOUTH DAILY WITH BREAKFAST, Disp: 30 tablet, Rfl: 1  •  Incruse Ellipta 62.5 MCG/INH aerosol powder , , Disp: , Rfl:   •  losartan (COZAAR) 100 MG tablet, TAKE ONE TABLET BY MOUTH DAILY, Disp: 90 tablet, Rfl: 0  •  nitroglycerin (NITROSTAT) 0.4 MG SL tablet, , Disp: , Rfl:   •  potassium chloride (K-DUR,KLOR-CON) 10 MEQ CR tablet, TAKE ONE TABLET BY MOUTH DAILY, Disp: 30 tablet, Rfl: 2  •  predniSONE (DELTASONE) 20 MG tablet, Take 1 tablet by mouth Daily., Disp: 90 tablet, Rfl: 0  •  Symbicort 160-4.5 MCG/ACT inhaler, , Disp: , Rfl:   •  Xarelto 20 MG tablet, , Disp: , Rfl:   •  amoxicillin-clavulanate (Augmentin) 875-125 MG per tablet, Take 1 tablet by mouth 2 (Two) Times a Day., Disp: 14 tablet, Rfl: 0    Allergies: Patient has no known allergies.    Physical Exam  Vitals reviewed: 2 L of chronic oxygen use via nasal cannula.   Constitutional:       Appearance: Normal appearance. He is normal weight.   HENT:      Head: Normocephalic and  atraumatic.   Eyes:      Pupils: Pupils are equal, round, and reactive to light.   Cardiovascular:      Rate and Rhythm: Normal rate and regular rhythm.      Pulses: Normal pulses.      Heart sounds: Normal heart sounds.   Pulmonary:      Effort: Pulmonary effort is normal.      Breath sounds: Normal breath sounds.   Musculoskeletal:      Cervical back: Normal range of motion and neck supple.   Skin:     General: Skin is warm and dry.   Neurological:      General: No focal deficit present.      Mental Status: He is alert. Mental status is at baseline.   Psychiatric:         Mood and Affect: Mood normal.         Behavior: Behavior normal.         Thought Content: Thought content normal.         Judgment: Judgment normal.          Result Review :                   Assessment and Plan    Diagnoses and all orders for this visit:    1. COPD with exacerbation (HCC) (Primary)  -     predniSONE (DELTASONE) 20 MG tablet; Take 1 tablet by mouth Daily.  Dispense: 90 tablet; Refill: 0  The steroids are the only thing that really keep his respiratory status stable especially with his pulmonologist saying there is not much more they can do for him for now we will continue to follow along as needed    2. Shortness of breath  Supplemental oxygen use his oxygen requirements are about the same which stable respiratory exam like his normal baseline  He is already seen cardiology uses as needed Lasix as well  Stable edema.    3. Folliculitis  He has intermittent folliculitis we will do the Augmentin prescription    4. Oral thrush  Oral Diflucan provided  The nystatin swish and swallow does not really seem to help him    5. Chronic lung disease  As above    Other orders  -     fluconazole (DIFLUCAN) 150 MG tablet; Take 1 tablet by mouth Daily.  Dispense: 5 tablet; Refill: 5  -     furosemide (LASIX) 40 MG tablet; Take 1 tablet by mouth Daily.  Dispense: 90 tablet; Refill: 0    MEDICATION SIDE EFFECTS, RISKS AND SIDE EFFECTS HAVE BEEN  DISCUSSED WITH PATIENT. PATIENT NOTES UNDERSTANDING. IF ANY CONCERN OR QUESTION REGARDING MEDICATION PLEASE CONTACT.       Follow Up   No follow-ups on file.  Patient was given instructions and counseling regarding his condition or for health maintenance advice. Please see specific information pulled into the AVS if appropriate.     Jody Gaspar DO

## 2022-12-09 NOTE — TELEPHONE ENCOUNTER
Rx Refill Note    Requested Prescriptions     Pending Prescriptions Disp Refills   • Accu-Chek Guide test strip [Pharmacy Med Name: ACCU-CHEK GUIDE TEST STRIP] 100 each 2     Sig: USE TO TEST BLOOD SUGAR  DAILY        Last office visit with prescribing clinician: 11/17/2022      Next office visit with prescribing clinician: 2/17/2023   Last labs:   Last refill: needs   Pharmacy (be sure to add in Epic). correct

## 2022-12-11 RX ORDER — BLOOD SUGAR DIAGNOSTIC
STRIP MISCELLANEOUS
Qty: 100 EACH | Refills: 2 | Status: SHIPPED | OUTPATIENT
Start: 2022-12-11 | End: 2023-02-17 | Stop reason: SDUPTHER

## 2022-12-13 ENCOUNTER — TELEPHONE (OUTPATIENT)
Dept: FAMILY MEDICINE CLINIC | Facility: CLINIC | Age: 67
End: 2022-12-13

## 2022-12-13 NOTE — TELEPHONE ENCOUNTER
Patient called in wanting to discuss his bill from [facility]. I had Marcos call [facility] and add some additional dx's. [facility] stated it will take 3-4 weeks to reprocess through his insurance.

## 2022-12-30 ENCOUNTER — TELEPHONE (OUTPATIENT)
Dept: FAMILY MEDICINE CLINIC | Facility: CLINIC | Age: 67
End: 2022-12-30
Payer: MEDICARE

## 2022-12-30 NOTE — TELEPHONE ENCOUNTER
Caller: KATY GONZALEZ    Relationship: Emergency Contact    Best call back number: [phone]     What medication are you requesting:TAMIFLU    What are your current symptoms: NONE, WIFE HAS THE FLU    How long have you been experiencing symptoms:NO SYMPTOMS  If a prescription is needed, what is your preferred pharmacy and phone number: [facility] [id number] - [place], [place] - [address] AT [address] - [phone] [facility] [phone] FX

## 2022-12-30 NOTE — TELEPHONE ENCOUNTER
Caller: KATY GONZALEZ    Relationship: Emergency Contact    Best call back number: [phone]    What is the best time to reach you: NOW    Who are you requesting to speak with (clinical staff, provider,  specific staff member): VIVIANA    Do you know the name of the person who called: WAITING FOR CALL BACK    What was the call regarding: CHECKING STATUS OF MEDICATION. PATIENT STATES SHE IS STILL WAITING FOR CALL BACK FROM Tuesday.     Do you require a callback: UNABLE TO WARM TRANSFER- PLEASE CALL

## 2023-01-02 DIAGNOSIS — R60.0 BILATERAL LOWER EXTREMITY EDEMA: ICD-10-CM

## 2023-01-03 ENCOUNTER — TELEPHONE (OUTPATIENT)
Dept: FAMILY MEDICINE CLINIC | Facility: CLINIC | Age: 68
End: 2023-01-03

## 2023-01-03 ENCOUNTER — OFFICE VISIT (OUTPATIENT)
Dept: FAMILY MEDICINE CLINIC | Facility: CLINIC | Age: 68
End: 2023-01-03
Payer: MEDICARE

## 2023-01-03 VITALS — OXYGEN SATURATION: 92 % | HEART RATE: 56 BPM

## 2023-01-03 DIAGNOSIS — J10.1 INFLUENZA A: Primary | ICD-10-CM

## 2023-01-03 DIAGNOSIS — R05.1 ACUTE COUGH: ICD-10-CM

## 2023-01-03 DIAGNOSIS — J40 BRONCHITIS: ICD-10-CM

## 2023-01-03 DIAGNOSIS — J44.9 COPD MIXED TYPE: ICD-10-CM

## 2023-01-03 LAB
EXPIRATION DATE: ABNORMAL
FLUAV AG UPPER RESP QL IA.RAPID: DETECTED
FLUBV AG UPPER RESP QL IA.RAPID: NOT DETECTED
INTERNAL CONTROL: ABNORMAL
Lab: ABNORMAL
SARS-COV-2 AG UPPER RESP QL IA.RAPID: NOT DETECTED

## 2023-01-03 PROCEDURE — 87428 SARSCOV & INF VIR A&B AG IA: CPT | Performed by: FAMILY MEDICINE

## 2023-01-03 PROCEDURE — 99214 OFFICE O/P EST MOD 30 MIN: CPT | Performed by: FAMILY MEDICINE

## 2023-01-03 RX ORDER — LEVOFLOXACIN 500 MG/1
500 TABLET, FILM COATED ORAL DAILY
Qty: 7 TABLET | Refills: 0 | Status: SHIPPED | OUTPATIENT
Start: 2023-01-03 | End: 2023-02-17 | Stop reason: SDUPTHER

## 2023-01-03 RX ORDER — OSELTAMIVIR PHOSPHATE 75 MG/1
75 CAPSULE ORAL 2 TIMES DAILY
Qty: 10 CAPSULE | Refills: 0 | Status: SHIPPED | OUTPATIENT
Start: 2023-01-03 | End: 2023-02-17

## 2023-01-03 RX ORDER — BENZONATATE 100 MG/1
100 CAPSULE ORAL 3 TIMES DAILY PRN
Qty: 21 CAPSULE | Refills: 0 | Status: SHIPPED | OUTPATIENT
Start: 2023-01-03 | End: 2023-02-17

## 2023-01-03 RX ORDER — POTASSIUM CHLORIDE 750 MG/1
TABLET, EXTENDED RELEASE ORAL
Qty: 30 TABLET | Refills: 2 | Status: SHIPPED | OUTPATIENT
Start: 2023-01-03 | End: 2023-02-17 | Stop reason: SDUPTHER

## 2023-01-03 NOTE — PROGRESS NOTES
Chief Complaint  Flu Exposure     Subjective          Iker Barber presents to [facility]  History of Present Illness  Pt is a 67 year old male with end stage COPD  Here after wife tested positive for flu a few days ago  He has had increased SOB and cough - no increased O2 requirements  Has not been taking any medications OTC      Objective   Vital Signs:   Pulse 56   SpO2 92%     There is no height or weight on file to calculate BMI.    Review of Systems   Constitutional: Negative.    HENT: Negative.    Eyes: Negative.    Respiratory: Positive for cough, shortness of breath and wheezing.    Cardiovascular: Negative.    Gastrointestinal: Negative.    Endocrine: Negative.    Genitourinary: Negative.    Musculoskeletal: Negative.    Skin: Negative.    Allergic/Immunologic: Negative.    Neurological: Negative.    Hematological: Negative.    Psychiatric/Behavioral: Negative.        Past History:  Medical History: has a past medical history of Anxiety, Arthritis, Asthma, Diabetes ([facility]), Elevated blood pressure reading, Emphysema, unspecified ([facility]), Esophageal reflux, Hearing problem, Heart disease, Hypercholesterolemia, Irritable bowel syndrome (IBS), Peripheral artery disease ([facility]), Skin disorder, and Stroke (cerebrum) ([facility]).   Surgical History: has no past surgical history on file.   Family History: family history includes Allergies in his mother; Alzheimer's disease in his mother; Diabetes in his brother, father, and mother.   Social History: reports that he quit smoking about 10 years ago. His smoking use included cigarettes. He started smoking about 50 years ago. He has never used smokeless tobacco. He reports that he does not currently use alcohol. He reports that he does not use drugs.      Current Outpatient Medications:   •  potassium chloride (K-DUR,KLOR-CON) 10 MEQ CR tablet, TAKE ONE TABLET BY MOUTH DAILY, Disp: 30 tablet, Rfl: 2  •  Accu-Chek Guide test strip, USE TO TEST  BLOOD SUGAR  DAILY, Disp: 100 each, Rfl: 2  •  albuterol sulfate  (90 Base) MCG/ACT inhaler, , Disp: , Rfl:   •  amoxicillin-clavulanate (Augmentin) 875-125 MG per tablet, Take 1 tablet by mouth 2 (Two) Times a Day., Disp: 14 tablet, Rfl: 0  •  azithromycin (ZITHROMAX) 250 MG tablet, , Disp: , Rfl:   •  benzonatate (Tessalon Perles) 100 MG capsule, Take 1 capsule by mouth 3 (Three) Times a Day As Needed for Cough., Disp: 21 capsule, Rfl: 0  •  dilTIAZem CD (CARDIZEM CD) 240 MG 24 hr capsule, Take 1 capsule by mouth Daily., Disp: 90 capsule, Rfl: 0  •  fluconazole (DIFLUCAN) 150 MG tablet, Take 1 tablet by mouth Daily., Disp: 5 tablet, Rfl: 5  •  furosemide (LASIX) 40 MG tablet, Take 1 tablet by mouth Daily., Disp: 90 tablet, Rfl: 0  •  glipizide (GLUCOTROL XL) 5 MG ER tablet, TAKE ONE TABLET BY MOUTH DAILY WITH BREAKFAST, Disp: 30 tablet, Rfl: 1  •  Incruse Ellipta 62.5 MCG/INH aerosol powder , , Disp: , Rfl:   •  levoFLOXacin (Levaquin) 500 MG tablet, Take 1 tablet by mouth Daily., Disp: 7 tablet, Rfl: 0  •  losartan (COZAAR) 100 MG tablet, TAKE ONE TABLET BY MOUTH DAILY, Disp: 90 tablet, Rfl: 0  •  nitroglycerin (NITROSTAT) 0.4 MG SL tablet, , Disp: , Rfl:   •  oseltamivir (Tamiflu) 75 MG capsule, Take 1 capsule by mouth 2 (Two) Times a Day., Disp: 10 capsule, Rfl: 0  •  predniSONE (DELTASONE) 20 MG tablet, Take 1 tablet by mouth Daily., Disp: 90 tablet, Rfl: 0  •  Symbicort 160-4.5 MCG/ACT inhaler, , Disp: , Rfl:   •  Xarelto 20 MG tablet, , Disp: , Rfl:     Allergies: Patient has no known allergies.    Physical Exam  Vitals reviewed: EXAM IS DONE AND LIMITED TO TELEMEIDINCE DUE TO ILLNESS EXPOSURE / COVID.          Result Review :                   Assessment and Plan    Diagnoses and all orders for this visit:    1. Influenza A (Primary)  Positive for Flu A  Contact precautions provided  Tamiflu requested - risks of medication provided  I have told him with him being high risk with his end stage COPD -  if any true SOB / wheezing go to ED - he voices full understanding    2. Acute cough  -     POCT SARS-CoV-2 Antigen JOSIAH + Flu  tesslon pearls provided for the cough  Continue O2 use    3. Bronchitis  With him being high risk concern for superimposed bronchitis infection  Will also do levaquin course on him  Strict return precautions provided    4. COPD mixed type (HCC)  CHRONIC continue O2 use  Inhalers / neb treatment  Other orders  -     oseltamivir (Tamiflu) 75 MG capsule; Take 1 capsule by mouth 2 (Two) Times a Day.  Dispense: 10 capsule; Refill: 0  -     benzonatate (Tessalon Perles) 100 MG capsule; Take 1 capsule by mouth 3 (Three) Times a Day As Needed for Cough.  Dispense: 21 capsule; Refill: 0  -     levoFLOXacin (Levaquin) 500 MG tablet; Take 1 tablet by mouth Daily.  Dispense: 7 tablet; Refill: 0      MEDICATION SIDE EFFECTS, RISKS AND SIDE EFFECTS HAVE BEEN DISCUSSED WITH PATIENT. PATIENT NOTES UNDERSTANDING. IF ANY CONCERN OR QUESTION REGARDING MEDICATION PLEASE CONTACT.         Follow Up   No follow-ups on file.  Patient was given instructions and counseling regarding his condition or for health maintenance advice. Please see specific information pulled into the AVS if appropriate.     Jody Gaspar DO

## 2023-01-22 DIAGNOSIS — I10 PRIMARY HYPERTENSION: ICD-10-CM

## 2023-01-23 RX ORDER — LOSARTAN POTASSIUM 100 MG/1
TABLET ORAL
Qty: 90 TABLET | Refills: 0 | Status: SHIPPED | OUTPATIENT
Start: 2023-01-23 | End: 2023-02-17 | Stop reason: SDUPTHER

## 2023-01-23 NOTE — TELEPHONE ENCOUNTER
Rx Refill Note    Requested Prescriptions     Pending Prescriptions Disp Refills   • losartan (COZAAR) 100 MG tablet [Pharmacy Med Name: LOSARTAN POTASSIUM 100 MG TAB] 90 tablet 0     Sig: TAKE ONE TABLET BY MOUTH DAILY        Last office visit with prescribing clinician: 1/3/2023      Next office visit with prescribing clinician: 2/17/2023   Last labs:   Last refill: 10/26/2022   Pharmacy (be sure to add in Epic). correct

## 2023-02-13 NOTE — TELEPHONE ENCOUNTER
Rx Refill Note    Requested Prescriptions     Pending Prescriptions Disp Refills   • dilTIAZem CD (CARDIZEM CD) 240 MG 24 hr capsule [Pharmacy Med Name: DILTIAZEM 24H ER(CD) 240 MG CP] 90 capsule 0     Sig: TAKE ONE CAPSULE BY MOUTH DAILY        Last office visit with prescribing clinician: 1/3/2023      Next office visit with prescribing clinician: 2/17/2023   Last labs:   Last refill: 11/15/2022   Pharmacy (be sure to add in Epic). correct

## 2023-02-14 RX ORDER — DILTIAZEM HYDROCHLORIDE 240 MG/1
CAPSULE, COATED, EXTENDED RELEASE ORAL
Qty: 90 CAPSULE | Refills: 0 | Status: SHIPPED | OUTPATIENT
Start: 2023-02-14 | End: 2023-02-17 | Stop reason: SDUPTHER

## 2023-02-14 RX ORDER — GLIPIZIDE 5 MG/1
TABLET, FILM COATED, EXTENDED RELEASE ORAL
Qty: 30 TABLET | Refills: 1 | Status: SHIPPED | OUTPATIENT
Start: 2023-02-14 | End: 2023-02-17 | Stop reason: SDUPTHER

## 2023-02-17 ENCOUNTER — TELEPHONE (OUTPATIENT)
Dept: FAMILY MEDICINE CLINIC | Facility: CLINIC | Age: 68
End: 2023-02-17

## 2023-02-17 ENCOUNTER — OFFICE VISIT (OUTPATIENT)
Dept: FAMILY MEDICINE CLINIC | Facility: CLINIC | Age: 68
End: 2023-02-17
Payer: MEDICARE

## 2023-02-17 VITALS
RESPIRATION RATE: 16 BRPM | BODY MASS INDEX: 20.81 KG/M2 | DIASTOLIC BLOOD PRESSURE: 70 MMHG | HEIGHT: 69 IN | OXYGEN SATURATION: 92 % | SYSTOLIC BLOOD PRESSURE: 100 MMHG | WEIGHT: 140.5 LBS | HEART RATE: 91 BPM | TEMPERATURE: 98.4 F

## 2023-02-17 DIAGNOSIS — I25.10 CORONARY ARTERY DISEASE INVOLVING NATIVE CORONARY ARTERY OF NATIVE HEART WITHOUT ANGINA PECTORIS: ICD-10-CM

## 2023-02-17 DIAGNOSIS — J44.9 END STAGE COPD: Primary | ICD-10-CM

## 2023-02-17 DIAGNOSIS — B37.0 ORAL THRUSH: ICD-10-CM

## 2023-02-17 DIAGNOSIS — Z86.73 HISTORY OF STROKE: ICD-10-CM

## 2023-02-17 DIAGNOSIS — J40 BRONCHITIS: ICD-10-CM

## 2023-02-17 DIAGNOSIS — I10 PRIMARY HYPERTENSION: ICD-10-CM

## 2023-02-17 DIAGNOSIS — E11.65 TYPE 2 DIABETES MELLITUS WITH HYPERGLYCEMIA, WITHOUT LONG-TERM CURRENT USE OF INSULIN: ICD-10-CM

## 2023-02-17 DIAGNOSIS — Z79.01 CHRONIC ANTICOAGULATION: ICD-10-CM

## 2023-02-17 DIAGNOSIS — R60.0 BILATERAL LOWER EXTREMITY EDEMA: ICD-10-CM

## 2023-02-17 PROCEDURE — 99214 OFFICE O/P EST MOD 30 MIN: CPT | Performed by: FAMILY MEDICINE

## 2023-02-17 RX ORDER — LANCETS
EACH MISCELLANEOUS
COMMUNITY

## 2023-02-17 RX ORDER — BUDESONIDE AND FORMOTEROL FUMARATE DIHYDRATE 160; 4.5 UG/1; UG/1
2 AEROSOL RESPIRATORY (INHALATION)
Qty: 3 EACH | Refills: 5 | Status: SHIPPED | OUTPATIENT
Start: 2023-02-17 | End: 2023-03-20 | Stop reason: SDUPTHER

## 2023-02-17 RX ORDER — NITROGLYCERIN 0.4 MG/1
0.4 TABLET SUBLINGUAL
Qty: 30 TABLET | Refills: 5 | Status: SHIPPED | OUTPATIENT
Start: 2023-02-17

## 2023-02-17 RX ORDER — AZITHROMYCIN 250 MG/1
250 TABLET, FILM COATED ORAL DAILY
COMMUNITY
End: 2023-02-17 | Stop reason: SDUPTHER

## 2023-02-17 RX ORDER — PREDNISONE 20 MG/1
20 TABLET ORAL DAILY
Qty: 90 TABLET | Refills: 0 | Status: SHIPPED | OUTPATIENT
Start: 2023-02-17

## 2023-02-17 RX ORDER — AZITHROMYCIN 250 MG/1
250 TABLET, FILM COATED ORAL 3 TIMES WEEKLY
Qty: 12 TABLET | Refills: 1 | Status: SHIPPED | OUTPATIENT
Start: 2023-02-17 | End: 2023-03-20 | Stop reason: SDUPTHER

## 2023-02-17 RX ORDER — ALBUTEROL SULFATE 0.63 MG/3ML
1 SOLUTION RESPIRATORY (INHALATION) EVERY 6 HOURS PRN
Qty: 100 ML | Refills: 12 | Status: SHIPPED | OUTPATIENT
Start: 2023-02-17 | End: 2023-03-20

## 2023-02-17 RX ORDER — FLUCONAZOLE 150 MG/1
150 TABLET ORAL DAILY
Qty: 5 TABLET | Refills: 5 | Status: SHIPPED | OUTPATIENT
Start: 2023-02-17

## 2023-02-17 RX ORDER — ALBUTEROL SULFATE 90 UG/1
2 AEROSOL, METERED RESPIRATORY (INHALATION) EVERY 4 HOURS PRN
Qty: 8 G | Refills: 10 | Status: SHIPPED | OUTPATIENT
Start: 2023-02-17 | End: 2023-03-20 | Stop reason: SDUPTHER

## 2023-02-17 RX ORDER — CLOBETASOL PROPIONATE 0.05 MG/G
1 GEL TOPICAL EVERY 12 HOURS SCHEDULED
COMMUNITY

## 2023-02-17 RX ORDER — DILTIAZEM HYDROCHLORIDE 240 MG/1
240 CAPSULE, COATED, EXTENDED RELEASE ORAL DAILY
Qty: 90 CAPSULE | Refills: 1 | Status: SHIPPED | OUTPATIENT
Start: 2023-02-17

## 2023-02-17 RX ORDER — FUROSEMIDE 40 MG/1
40 TABLET ORAL DAILY
Qty: 90 TABLET | Refills: 1 | Status: SHIPPED | OUTPATIENT
Start: 2023-02-17

## 2023-02-17 RX ORDER — GLIPIZIDE 5 MG/1
5 TABLET, FILM COATED, EXTENDED RELEASE ORAL
Qty: 30 TABLET | Refills: 5 | Status: SHIPPED | OUTPATIENT
Start: 2023-02-17

## 2023-02-17 RX ORDER — CLOBETASOL PROPIONATE 0.46 MG/ML
SOLUTION TOPICAL 2 TIMES DAILY
Qty: 50 ML | Refills: 3 | Status: SHIPPED | OUTPATIENT
Start: 2023-02-17

## 2023-02-17 RX ORDER — LOSARTAN POTASSIUM 100 MG/1
100 TABLET ORAL DAILY
Qty: 90 TABLET | Refills: 1 | Status: SHIPPED | OUTPATIENT
Start: 2023-02-17

## 2023-02-17 RX ORDER — POTASSIUM CHLORIDE 750 MG/1
10 TABLET, EXTENDED RELEASE ORAL DAILY
Qty: 90 TABLET | Refills: 1 | Status: SHIPPED | OUTPATIENT
Start: 2023-02-17

## 2023-02-17 RX ORDER — LEVOFLOXACIN 500 MG/1
500 TABLET, FILM COATED ORAL DAILY
Qty: 7 TABLET | Refills: 0 | Status: SHIPPED | OUTPATIENT
Start: 2023-02-17

## 2023-02-17 RX ORDER — ALBUTEROL SULFATE 2.5 MG/3ML
2.5 SOLUTION RESPIRATORY (INHALATION) EVERY 4 HOURS PRN
COMMUNITY
End: 2023-03-20 | Stop reason: SDUPTHER

## 2023-02-17 NOTE — PROGRESS NOTES
"Chief Complaint  COPD with exacerbation  (Pt here for follow up on COPD exacerbation)    Subjective          Iker Barber presents to [facility]  History of Present Illness  Patient is a 67-year-old male with end-stage COPD was already been dismissed from his pulmonologist stating that there is not much more they can do for him who is currently dependent on oxygen about 3 L who presents here for med recheck and refill.    He states he would like us to contact his oxygen provider Mike to see they stated they needed a doctor's orders to continue his oxygen.  He does have a concentrator at home but is able to go up to 8 L he really struggles in the morning when he gets a copious amount of thick drainage and fluid which is when he uses some of the medications inhalers and nebulizer solution.  He likes to have on hand antibiotic such as azithromycin and Levaquin for when his respiratory status gets bad.  He also recently have a sleep study which did show some hypoxia nocturnal.    In regards to his diabetes and his other chronic medical conditions from a cardiac standpoint he is doing well no other major questions concerns at this time    He notes that he is not ready to accept her going to hospice at this time despite knowing the severity of his medical condition.      Objective   Vital Signs:   /70 (BP Location: Left arm, Patient Position: Sitting, Cuff Size: Adult)   Pulse 91   Temp 98.4 °F (36.9 °C) (Temporal)   Resp 16   Ht 175.3 cm (69\")   Wt 63.7 kg (140 lb 8 oz)   SpO2 92% Comment: 3 liters  BMI 20.75 kg/m²     Body mass index is 20.75 kg/m².    Review of Systems   Constitutional: Negative.    HENT: Negative.    Eyes: Negative.    Respiratory: Positive for cough and shortness of breath.    Cardiovascular: Negative.    Gastrointestinal: Negative.    Endocrine: Negative.    Genitourinary: Negative.    Musculoskeletal: Negative.    Skin: Negative.  "   Allergic/Immunologic: Negative.    Neurological: Negative.    Hematological: Negative.    Psychiatric/Behavioral: Negative.        Past History:  Medical History: has a past medical history of Anxiety, Arthritis, Asthma, Diabetes ([facility]), Elevated blood pressure reading, Emphysema, unspecified ([facility]), Esophageal reflux, Hearing problem, Heart disease, Hypercholesterolemia, Irritable bowel syndrome (IBS), Peripheral artery disease ([facility]), Skin disorder, and Stroke (cerebrum) ([facility]).   Surgical History: has no past surgical history on file.   Family History: family history includes Allergies in his mother; Alzheimer's disease in his mother; Diabetes in his brother, father, and mother.   Social History: reports that he quit smoking about 10 years ago. His smoking use included cigarettes. He started smoking about 50 years ago. He has never used smokeless tobacco. He reports that he does not currently use alcohol. He reports that he does not use drugs.      Current Outpatient Medications:   •  Accu-Chek Softclix Lancets lancets, by Other route. Use as instructed, Disp: , Rfl:   •  albuterol (PROVENTIL) (2.5 MG/3ML) 0.083% nebulizer solution, Take 2.5 mg by nebulization Every 4 (Four) Hours As Needed., Disp: , Rfl:   •  albuterol sulfate  (90 Base) MCG/ACT inhaler, Inhale 2 puffs Every 4 (Four) Hours As Needed for Wheezing., Disp: 8 g, Rfl: 10  •  azithromycin (ZITHROMAX) 250 MG tablet, Take 1 tablet by mouth 3 (Three) Times a Week., Disp: 12 tablet, Rfl: 1  •  clobetasol (TEMOVATE) 0.05 % gel, Apply 1 application topically to the appropriate area as directed Every 12 (Twelve) Hours., Disp: , Rfl:   •  dilTIAZem CD (CARDIZEM CD) 240 MG 24 hr capsule, Take 1 capsule by mouth Daily., Disp: 90 capsule, Rfl: 1  •  fluconazole (DIFLUCAN) 150 MG tablet, Take 1 tablet by mouth Daily., Disp: 5 tablet, Rfl: 5  •  furosemide (LASIX) 40 MG tablet, Take 1 tablet by mouth Daily., Disp: 90 tablet, Rfl: 1  •  glipizide (GLUCOTROL  XL) 5 MG ER tablet, Take 1 tablet by mouth Daily With Breakfast., Disp: 30 tablet, Rfl: 5  •  glucose blood (Accu-Chek Guide) test strip, 1 each by Other route Daily. Use as instructed, Disp: 100 each, Rfl: 2  •  Incruse Ellipta 62.5 MCG/INH aerosol powder , , Disp: , Rfl:   •  levoFLOXacin (Levaquin) 500 MG tablet, Take 1 tablet by mouth Daily., Disp: 7 tablet, Rfl: 0  •  losartan (COZAAR) 100 MG tablet, Take 1 tablet by mouth Daily., Disp: 90 tablet, Rfl: 1  •  nitroglycerin (NITROSTAT) 0.4 MG SL tablet, Place 1 tablet under the tongue Every 5 (Five) Minutes As Needed for Chest Pain., Disp: 30 tablet, Rfl: 5  •  potassium chloride (K-DUR,KLOR-CON) 10 MEQ CR tablet, Take 1 tablet by mouth Daily., Disp: 90 tablet, Rfl: 1  •  predniSONE (DELTASONE) 20 MG tablet, Take 1 tablet by mouth Daily., Disp: 90 tablet, Rfl: 0  •  Symbicort 160-4.5 MCG/ACT inhaler, Inhale 2 puffs 2 (Two) Times a Day., Disp: 3 each, Rfl: 5  •  Xarelto 20 MG tablet, , Disp: , Rfl:   •  albuterol (ACCUNEB) 0.63 MG/3ML nebulizer solution, Take 3 mL by nebulization Every 6 (Six) Hours As Needed for Wheezing., Disp: 100 mL, Rfl: 12  •  clobetasol (TEMOVATE) 0.05 % external solution, Apply  topically to the appropriate area as directed 2 (Two) Times a Day., Disp: 50 mL, Rfl: 3    Allergies: Patient has no known allergies.    Physical Exam  Constitutional:       Appearance: Normal appearance. He is normal weight.   HENT:      Head: Normocephalic and atraumatic.   Eyes:      Pupils: Pupils are equal, round, and reactive to light.   Cardiovascular:      Rate and Rhythm: Normal rate and regular rhythm.      Pulses: Normal pulses.      Heart sounds: Normal heart sounds.   Pulmonary:      Comments: 2 L of oxygen via nasal cannula he does have some scattered wheezes and some increased phlegm restricted air movement no rales rhonchi.  No evidence of actual respiratory distress at this time  Musculoskeletal:      Cervical back: Normal range of motion and neck  supple.   Skin:     General: Skin is warm and dry.   Neurological:      General: No focal deficit present.      Mental Status: He is alert. Mental status is at baseline.   Psychiatric:         Mood and Affect: Mood normal.         Behavior: Behavior normal.         Thought Content: Thought content normal.         Judgment: Judgment normal.          Result Review :                   Assessment and Plan    Diagnoses and all orders for this visit:    1. End stage COPD (HCC) (Primary)  -     predniSONE (DELTASONE) 20 MG tablet; Take 1 tablet by mouth Daily.  Dispense: 90 tablet; Refill: 0  I had a long discussion with patient for now he is end-stage COPD and understands the severity of his disease he continues to not want to be placed on any type of home with hospice.  He is content with his oxygen which we will make sure that we get set up back up for him to make sure he continues to have it.  In the meantime he is also stable on the use of his inhalers, his neb treatments and as needed prednisone.  I have agreed to go ahead and give him a prescription that he is to save and keep for as needed when he gets a really bad pneumonia or infection to Levaquin versus azithromycin which is what his previous pulmonologist that kept him in.  He does voiced full understanding of the risks of these antibiotics and some of the interactions they have with his oral diabetic agents especially from a cardiac standpoint and to only use this if needed. (QT prolongation)  We will continue to be here to follow-up on from a respiratory standpoint to keep him comfortable if he ever makes a decision to use home with hospice his respiratory status is about his baseline today    2. Bronchitis  As above    3. Primary hypertension  -     losartan (COZAAR) 100 MG tablet; Take 1 tablet by mouth Daily.  Dispense: 90 tablet; Refill: 1  Stable continue meds monitoring    4. Bilateral lower extremity edema  -     potassium chloride (K-DUR,KLOR-CON) 10  MEQ CR tablet; Take 1 tablet by mouth Daily.  Dispense: 90 tablet; Refill: 1  He is overdue for blood work but with his other medical conditions he wants to hold off full go ahead and at some point in the near future definitely recommend with him being on the as needed Lasix need to check and assess renal function, potassium    5. Type 2 diabetes mellitus with hyperglycemia, without long-term current use of insulin (HCC)  Within the next month or 2 recommending getting into get updated A1c continue regimen for now    6. Coronary artery disease involving native coronary artery of native heart without angina pectoris  Stable as needed cardiology follow-up with medication no endorsement of chest pain    7. Chronic anticoagulation  Chronic in nature  8. Oral thrush  Intermittent no endorsement today    9. History of stroke  Chronic and resolved    Other orders  -     albuterol sulfate  (90 Base) MCG/ACT inhaler; Inhale 2 puffs Every 4 (Four) Hours As Needed for Wheezing.  Dispense: 8 g; Refill: 10  -     azithromycin (ZITHROMAX) 250 MG tablet; Take 1 tablet by mouth 3 (Three) Times a Week.  Dispense: 12 tablet; Refill: 1  -     dilTIAZem CD (CARDIZEM CD) 240 MG 24 hr capsule; Take 1 capsule by mouth Daily.  Dispense: 90 capsule; Refill: 1  -     nitroglycerin (NITROSTAT) 0.4 MG SL tablet; Place 1 tablet under the tongue Every 5 (Five) Minutes As Needed for Chest Pain.  Dispense: 30 tablet; Refill: 5  -     Symbicort 160-4.5 MCG/ACT inhaler; Inhale 2 puffs 2 (Two) Times a Day.  Dispense: 3 each; Refill: 5  -     furosemide (LASIX) 40 MG tablet; Take 1 tablet by mouth Daily.  Dispense: 90 tablet; Refill: 1  -     glipizide (GLUCOTROL XL) 5 MG ER tablet; Take 1 tablet by mouth Daily With Breakfast.  Dispense: 30 tablet; Refill: 5  -     glucose blood (Accu-Chek Guide) test strip; 1 each by Other route Daily. Use as instructed  Dispense: 100 each; Refill: 2  -     fluconazole (DIFLUCAN) 150 MG tablet; Take 1 tablet  by mouth Daily.  Dispense: 5 tablet; Refill: 5  -     clobetasol (TEMOVATE) 0.05 % external solution; Apply  topically to the appropriate area as directed 2 (Two) Times a Day.  Dispense: 50 mL; Refill: 3  -     levoFLOXacin (Levaquin) 500 MG tablet; Take 1 tablet by mouth Daily.  Dispense: 7 tablet; Refill: 0  -     albuterol (ACCUNEB) 0.63 MG/3ML nebulizer solution; Take 3 mL by nebulization Every 6 (Six) Hours As Needed for Wheezing.  Dispense: 100 mL; Refill: 12      MEDICATION SIDE EFFECTS, RISKS AND SIDE EFFECTS HAVE BEEN DISCUSSED WITH PATIENT. PATIENT NOTES UNDERSTANDING. IF ANY CONCERN OR QUESTION REGARDING MEDICATION PLEASE CONTACT.     Follow Up   No follow-ups on file.  Patient was given instructions and counseling regarding his condition or for health maintenance advice. Please see specific information pulled into the AVS if appropriate.     Jody Gaspar DO

## 2023-02-20 ENCOUNTER — LAB (OUTPATIENT)
Dept: FAMILY MEDICINE CLINIC | Facility: CLINIC | Age: 68
End: 2023-02-20
Payer: MEDICARE

## 2023-02-20 DIAGNOSIS — E11.65 TYPE 2 DIABETES MELLITUS WITH HYPERGLYCEMIA, WITHOUT LONG-TERM CURRENT USE OF INSULIN: Primary | ICD-10-CM

## 2023-02-20 DIAGNOSIS — E11.65 TYPE 2 DIABETES MELLITUS WITH HYPERGLYCEMIA, WITHOUT LONG-TERM CURRENT USE OF INSULIN: ICD-10-CM

## 2023-02-20 PROCEDURE — 36415 COLL VENOUS BLD VENIPUNCTURE: CPT | Performed by: FAMILY MEDICINE

## 2023-02-20 RX ORDER — ALBUTEROL SULFATE 1.25 MG/3ML
1 SOLUTION RESPIRATORY (INHALATION) EVERY 6 HOURS PRN
Qty: 200 ML | Refills: 12 | Status: SHIPPED | OUTPATIENT
Start: 2023-02-20

## 2023-02-21 LAB
ALBUMIN SERPL-MCNC: 4.3 G/DL (ref 3.8–4.8)
ALBUMIN/GLOB SERPL: 2 {RATIO} (ref 1.2–2.2)
ALP SERPL-CCNC: 107 IU/L (ref 44–121)
ALT SERPL-CCNC: 18 IU/L (ref 0–44)
AST SERPL-CCNC: 15 IU/L (ref 0–40)
BILIRUB SERPL-MCNC: 0.3 MG/DL (ref 0–1.2)
BUN SERPL-MCNC: 17 MG/DL (ref 8–27)
BUN/CREAT SERPL: 20 (ref 10–24)
CALCIUM SERPL-MCNC: 9.2 MG/DL (ref 8.6–10.2)
CHLORIDE SERPL-SCNC: 98 MMOL/L (ref 96–106)
CO2 SERPL-SCNC: 32 MMOL/L (ref 20–29)
CREAT SERPL-MCNC: 0.84 MG/DL (ref 0.76–1.27)
EGFRCR SERPLBLD CKD-EPI 2021: 96 ML/MIN/1.73
GLOBULIN SER CALC-MCNC: 2.1 G/DL (ref 1.5–4.5)
GLUCOSE SERPL-MCNC: 241 MG/DL (ref 70–99)
HBA1C MFR BLD: 12.1 % (ref 4.8–5.6)
POTASSIUM SERPL-SCNC: 4.3 MMOL/L (ref 3.5–5.2)
PROT SERPL-MCNC: 6.4 G/DL (ref 6–8.5)
SODIUM SERPL-SCNC: 144 MMOL/L (ref 134–144)

## 2023-02-24 ENCOUNTER — TELEPHONE (OUTPATIENT)
Dept: FAMILY MEDICINE CLINIC | Facility: CLINIC | Age: 68
End: 2023-02-24

## 2023-02-24 NOTE — TELEPHONE ENCOUNTER
Caller: Iker Barber    Relationship: Self    Best call back number:      [phone]      Which medication are you concerned about:     PATIENT STATED HE WAS ADVISED BY PHARMACY THAT ONE OR MORE OF THE MEDICATIONS TO BE REFILLED REQUIRE A PRIOR AUTHORIZATION FOR HIS INSURANCE    PATIENT STATED HE DID NOT KNOW WHICH MEDICATION(S) MAY HAVE BEEN INCLUDED    Who prescribed you this medication:     DR MROOCHO

## 2023-03-17 ENCOUNTER — TELEPHONE (OUTPATIENT)
Dept: FAMILY MEDICINE CLINIC | Facility: CLINIC | Age: 68
End: 2023-03-17
Payer: MEDICARE

## 2023-03-20 RX ORDER — ALBUTEROL SULFATE 90 UG/1
2 AEROSOL, METERED RESPIRATORY (INHALATION) EVERY 4 HOURS PRN
Qty: 8 G | Refills: 10 | Status: SHIPPED | OUTPATIENT
Start: 2023-03-20

## 2023-03-20 RX ORDER — BUDESONIDE AND FORMOTEROL FUMARATE DIHYDRATE 160; 4.5 UG/1; UG/1
2 AEROSOL RESPIRATORY (INHALATION)
Qty: 3 EACH | Refills: 5 | Status: SHIPPED | OUTPATIENT
Start: 2023-03-20

## 2023-03-20 RX ORDER — ALBUTEROL SULFATE 2.5 MG/3ML
2.5 SOLUTION RESPIRATORY (INHALATION) EVERY 4 HOURS PRN
Qty: 25 ML | Refills: 5 | Status: SHIPPED | OUTPATIENT
Start: 2023-03-20

## 2023-03-20 RX ORDER — AZITHROMYCIN 250 MG/1
250 TABLET, FILM COATED ORAL 3 TIMES WEEKLY
Qty: 36 TABLET | Refills: 0 | Status: SHIPPED | OUTPATIENT
Start: 2023-03-20

## 2023-03-20 NOTE — TELEPHONE ENCOUNTER
PATIENT CALLED TO SPEAK WITH ABBY ABOUT A PA FOR HIS MEDICATION, BUT DID NOT SPECIFY WHAT MEDICATION. I LET PATIENT KNOW ABBY WAS BUSY ROOMING A PATIENT AND WAS UNAVAILABLE TO TAKE HIS CALL. THAT I WOULD LEAVE A MESSAGE FOR HER TO CALL HIM BACK. HE THEN STATED THAT IF SHE DIDN'T CALL HIM BACK WHEN SHE CAME OUT OF THE ROOM THAT HE WAS GOING STRAIGHT TO THE HOSPITAL. STATED NO ONE IS TAKING HIS HEALTH SERIOUS.

## 2023-03-20 NOTE — TELEPHONE ENCOUNTER
Arminda seen nothing come across my desk pertaining to his medication. I will not know what medication it is unless he can call his pharmacy and find out. Unless we get a request for a pa from the pharmacy we do not know what medication need one.   
Patient called  And stated he has been having trouble getting his meds at the pharmacy. They state he needs a PA for some. He didn't state which meds he needs he just wants someone to call him back. He thinks we are billing or coding Wrong.   
The pharmacy was contacted and thereis no pa that needs to be done at all pt is having to pay more for medication this year and did not realize it. Patient was contacted   
[Negative] : Heme/Lymph

## 2023-04-03 ENCOUNTER — TELEPHONE (OUTPATIENT)
Dept: FAMILY MEDICINE CLINIC | Facility: CLINIC | Age: 68
End: 2023-04-03
Payer: MEDICARE

## 2023-04-03 NOTE — TELEPHONE ENCOUNTER
Hub to Read:    Left voicemail for patient to call back to schedule an appt w/ Hubert to follow up on results of lab results.

## 2023-04-19 ENCOUNTER — OFFICE VISIT (OUTPATIENT)
Dept: FAMILY MEDICINE CLINIC | Facility: CLINIC | Age: 68
End: 2023-04-19
Payer: MEDICARE

## 2023-04-19 VITALS
HEART RATE: 93 BPM | HEIGHT: 69 IN | DIASTOLIC BLOOD PRESSURE: 82 MMHG | WEIGHT: 136.9 LBS | OXYGEN SATURATION: 98 % | BODY MASS INDEX: 20.28 KG/M2 | SYSTOLIC BLOOD PRESSURE: 132 MMHG

## 2023-04-19 DIAGNOSIS — E11.65 TYPE 2 DIABETES MELLITUS WITH HYPERGLYCEMIA, WITHOUT LONG-TERM CURRENT USE OF INSULIN: ICD-10-CM

## 2023-04-19 DIAGNOSIS — I10 PRIMARY HYPERTENSION: ICD-10-CM

## 2023-04-19 DIAGNOSIS — J44.9 END STAGE COPD: Primary | ICD-10-CM

## 2023-04-19 DIAGNOSIS — J40 BRONCHITIS: ICD-10-CM

## 2023-04-19 DIAGNOSIS — Z99.81 SUPPLEMENTAL OXYGEN DEPENDENT: ICD-10-CM

## 2023-04-19 PROCEDURE — 99214 OFFICE O/P EST MOD 30 MIN: CPT | Performed by: FAMILY MEDICINE

## 2023-04-19 PROCEDURE — 3075F SYST BP GE 130 - 139MM HG: CPT | Performed by: FAMILY MEDICINE

## 2023-04-19 PROCEDURE — 3046F HEMOGLOBIN A1C LEVEL >9.0%: CPT | Performed by: FAMILY MEDICINE

## 2023-04-19 PROCEDURE — 3079F DIAST BP 80-89 MM HG: CPT | Performed by: FAMILY MEDICINE

## 2023-04-19 RX ORDER — LEVOFLOXACIN 500 MG/1
500 TABLET, FILM COATED ORAL DAILY
Qty: 7 TABLET | Refills: 0 | Status: SHIPPED | OUTPATIENT
Start: 2023-04-19

## 2023-04-19 RX ORDER — ALBUTEROL SULFATE 1.25 MG/3ML
1 SOLUTION RESPIRATORY (INHALATION) EVERY 6 HOURS PRN
Qty: 200 ML | Refills: 12 | Status: CANCELLED | OUTPATIENT
Start: 2023-04-19

## 2023-04-19 RX ORDER — LOSARTAN POTASSIUM 100 MG/1
100 TABLET ORAL DAILY
Qty: 90 TABLET | Refills: 1 | Status: SHIPPED | OUTPATIENT
Start: 2023-04-19

## 2023-04-19 RX ORDER — AZITHROMYCIN 250 MG/1
250 TABLET, FILM COATED ORAL DAILY
Qty: 36 TABLET | Refills: 0 | Status: SHIPPED | OUTPATIENT
Start: 2023-04-19

## 2023-04-19 RX ORDER — ALBUTEROL SULFATE 2.5 MG/3ML
2.5 SOLUTION RESPIRATORY (INHALATION) EVERY 4 HOURS PRN
Qty: 25 ML | Refills: 5 | Status: SHIPPED | OUTPATIENT
Start: 2023-04-19

## 2023-04-19 NOTE — PROGRESS NOTES
"Chief Complaint  Med Refill and Diabetes    Subjective          Iker Barber presents to [facility]  History of Present Illness  Patient is a 68-year-old male with end-stage COPD who presents for med refills.  He also has a known history of type 2 diabetes which is significantly been uncontrolled due to his consistent prednisone use causing the hyperglycemia.  He has however discontinued the prednisone recently to help with his glycemic control.  He had been on glipizide has not noticed improvement of symptoms.  He feels like he is unable to tolerate the specific medication.    He also has been on azithromycin 3 times a week for many years now for preventative standpoint, his pulmonologist would like refills of this    He also like to have a prescription of Levaquin on hand for when he gets very sick or has any type of upper respiratory infection.    His oxygen and breathing status is about the same he is dependent on 2 to 3 L of oxygen at all times.      Objective   Vital Signs:   /82   Pulse 93   Ht 175.3 cm (69.02\")   Wt 62.1 kg (136 lb 14.4 oz)   SpO2 98%   BMI 20.21 kg/m²     Body mass index is 20.21 kg/m².    Review of Systems   Constitutional: Negative.    HENT: Negative.    Eyes: Negative.    Respiratory: Positive for shortness of breath.    Cardiovascular: Negative.    Gastrointestinal: Negative.    Endocrine: Negative.    Genitourinary: Negative.    Musculoskeletal: Negative.    Skin: Negative.    Allergic/Immunologic: Negative.    Neurological: Negative.    Hematological: Negative.    Psychiatric/Behavioral: Negative.        Past History:  Medical History: has a past medical history of Anxiety, Arthritis, Asthma, Diabetes, Elevated blood pressure reading, Emphysema, unspecified, Esophageal reflux, Hearing problem, Heart disease, Hypercholesterolemia, Irritable bowel syndrome (IBS), Peripheral artery disease, Skin disorder, and Stroke (cerebrum).   Surgical " History: has no past surgical history on file.   Family History: family history includes Allergies in his mother; Alzheimer's disease in his mother; Diabetes in his brother, father, and mother.   Social History: reports that he quit smoking about 10 years ago. His smoking use included cigarettes. He started smoking about 50 years ago. He has never used smokeless tobacco. He reports that he does not currently use alcohol. He reports that he does not use drugs.      Current Outpatient Medications:   •  Accu-Chek Softclix Lancets lancets, by Other route. Use as instructed, Disp: , Rfl:   •  albuterol (ACCUNEB) 1.25 MG/3ML nebulizer solution, Take 3 mL by nebulization Every 6 (Six) Hours As Needed for Wheezing., Disp: 200 mL, Rfl: 12  •  albuterol (PROVENTIL) (2.5 MG/3ML) 0.083% nebulizer solution, Take 2.5 mg by nebulization Every 4 (Four) Hours As Needed for Shortness of Air., Disp: 25 mL, Rfl: 5  •  albuterol sulfate  (90 Base) MCG/ACT inhaler, Inhale 2 puffs Every 4 (Four) Hours As Needed for Wheezing., Disp: 8 g, Rfl: 10  •  azithromycin (ZITHROMAX) 250 MG tablet, Take 1 tablet by mouth Daily., Disp: 36 tablet, Rfl: 0  •  clobetasol (TEMOVATE) 0.05 % external solution, Apply  topically to the appropriate area as directed 2 (Two) Times a Day., Disp: 50 mL, Rfl: 3  •  dilTIAZem CD (CARDIZEM CD) 240 MG 24 hr capsule, Take 1 capsule by mouth Daily., Disp: 90 capsule, Rfl: 1  •  fluconazole (DIFLUCAN) 150 MG tablet, Take 1 tablet by mouth Daily., Disp: 5 tablet, Rfl: 5  •  furosemide (LASIX) 40 MG tablet, Take 1 tablet by mouth Daily., Disp: 90 tablet, Rfl: 1  •  glucose blood (Accu-Chek Guide) test strip, 1 each by Other route Daily. Use as instructed, Disp: 100 each, Rfl: 2  •  levoFLOXacin (Levaquin) 500 MG tablet, Take 1 tablet by mouth Daily., Disp: 7 tablet, Rfl: 0  •  losartan (COZAAR) 100 MG tablet, Take 1 tablet by mouth Daily., Disp: 90 tablet, Rfl: 1  •  nitroglycerin (NITROSTAT) 0.4 MG SL tablet,  Place 1 tablet under the tongue Every 5 (Five) Minutes As Needed for Chest Pain., Disp: 30 tablet, Rfl: 5  •  potassium chloride (K-DUR,KLOR-CON) 10 MEQ CR tablet, Take 1 tablet by mouth Daily., Disp: 90 tablet, Rfl: 1  •  predniSONE (DELTASONE) 20 MG tablet, Take 1 tablet by mouth Daily., Disp: 90 tablet, Rfl: 0  •  Symbicort 160-4.5 MCG/ACT inhaler, Inhale 2 puffs 2 (Two) Times a Day., Disp: 3 each, Rfl: 5  •  empagliflozin (Jardiance) 25 MG tablet tablet, Take 1 tablet by mouth Daily., Disp: 30 tablet, Rfl: 3    Allergies: Patient has no known allergies.    Physical Exam  Constitutional:       Appearance: Normal appearance. He is normal weight.   HENT:      Head: Normocephalic and atraumatic.   Eyes:      Pupils: Pupils are equal, round, and reactive to light.   Cardiovascular:      Rate and Rhythm: Normal rate and regular rhythm.      Pulses: Normal pulses.      Heart sounds: Normal heart sounds.   Pulmonary:      Comments: Scattered wheezes in both lung fields no actual distress patient is at his baseline on 2 L of oxygen via nasal cannula.  Musculoskeletal:      Cervical back: Normal range of motion and neck supple.   Skin:     General: Skin is warm and dry.   Neurological:      General: No focal deficit present.      Mental Status: He is alert. Mental status is at baseline.   Psychiatric:         Mood and Affect: Mood normal.         Behavior: Behavior normal.         Thought Content: Thought content normal.         Judgment: Judgment normal.          Result Review :                   Assessment and Plan    Diagnoses and all orders for this visit:    1. End stage COPD (Primary)  He continues to be end-stage COPD dependent on oxygen continue inhaler use  He has as needed nebulizer solution as needed prednisone and albuterol inhaler    2. Type 2 diabetes mellitus with hyperglycemia, without long-term current use of insulin  Patient is denying wanting to get his A1c checked today as he does believe it was to be  have discussed with him hypoglycemia can be dangerous last A1c at 12.1 we will switch him to Jardiance and recheck in 1 month  Continue blood glucose monitoring    3. Primary hypertension  -     losartan (COZAAR) 100 MG tablet; Take 1 tablet by mouth Daily.  Dispense: 90 tablet; Refill: 1  Stable continue meds monitoring    RETURN PRECAUTIONS PROVIDED TO RETURN FOR EVALUATION - IF ANY PERSISTENT LEVELS OF ELEVATED BP READINGS ESPECIALLY IF ELEVATED CONSISTENTLY ABOVE 140/90 - IF CP OR SOB GO TO ED    4. Supplemental oxygen dependent  As above    5. Bronchitis  I have sent in the azithromycin he takes 3 times a week on a preventative standpoint with the COPD and the Levaquin for him to keep as needed he is fully aware not to take both of these medications together can cause QT prolongation    Other orders  -     levoFLOXacin (Levaquin) 500 MG tablet; Take 1 tablet by mouth Daily.  Dispense: 7 tablet; Refill: 0  -     azithromycin (ZITHROMAX) 250 MG tablet; Take 1 tablet by mouth Daily.  Dispense: 36 tablet; Refill: 0  -     albuterol (PROVENTIL) (2.5 MG/3ML) 0.083% nebulizer solution; Take 2.5 mg by nebulization Every 4 (Four) Hours As Needed for Shortness of Air.  Dispense: 25 mL; Refill: 5  -     empagliflozin (Jardiance) 25 MG tablet tablet; Take 1 tablet by mouth Daily.  Dispense: 30 tablet; Refill: 3      MEDICATION SIDE EFFECTS, RISKS AND SIDE EFFECTS HAVE BEEN DISCUSSED WITH PATIENT. PATIENT NOTES UNDERSTANDING. IF ANY CONCERN OR QUESTION REGARDING MEDICATION PLEASE CONTACT.         Follow Up   No follow-ups on file.  Patient was given instructions and counseling regarding his condition or for health maintenance advice. Please see specific information pulled into the AVS if appropriate.     Jody Gaspar DO

## 2023-04-28 ENCOUNTER — TELEPHONE (OUTPATIENT)
Dept: FAMILY MEDICINE CLINIC | Facility: CLINIC | Age: 68
End: 2023-04-28

## 2023-04-28 DIAGNOSIS — J44.9 END STAGE COPD: Primary | ICD-10-CM

## 2023-04-28 RX ORDER — ALBUTEROL SULFATE 2.5 MG/3ML
2.5 SOLUTION RESPIRATORY (INHALATION) EVERY 4 HOURS PRN
Qty: 25 ML | Refills: 5 | Status: SHIPPED | OUTPATIENT
Start: 2023-04-28

## 2023-04-28 RX ORDER — ALBUTEROL SULFATE 2.5 MG/3ML
2.5 SOLUTION RESPIRATORY (INHALATION) EVERY 4 HOURS PRN
Qty: 25 ML | Refills: 5 | Status: CANCELLED | OUTPATIENT
Start: 2023-04-28

## 2023-04-28 NOTE — TELEPHONE ENCOUNTER
Caller: Iker Barber    Relationship to patient: Self    Best call back number: [phone]    Patient is needing: PATIENT STATED HE IS STILL GETTING THE WRONG PRESCRIPTION FOR HIS    albuterol (PROVENTIL) (2.5 MG/3ML) 0.083% nebulizer solution     STATED HE USED TO RECEIVE 2 PACKS TO A BOX.     PHARMACIST STATED PROVIDER NEEDS TO INCLUDE PATIENTS DIAGNOSIS CODE OR INFORMATION IN ORDER FOR MEDICARE B TO COVER.      PATIENT REQUESTS CALL BACK TO SPEAK WITH NURSE

## 2023-04-28 NOTE — TELEPHONE ENCOUNTER
Rx Refill Note    Requested Prescriptions     Pending Prescriptions Disp Refills   • albuterol (PROVENTIL) (2.5 MG/3ML) 0.083% nebulizer solution 25 mL 5     Sig: Take 2.5 mg by nebulization Every 4 (Four) Hours As Needed for Shortness of Air.        Last office visit with prescribing clinician: 4/19/2023      Next office visit with prescribing clinician: 5/8/2023   Last labs:   Last refill: Dr. Gaspar, please see below and advise.  I have the dx added for you.  I can call the patient back afterwards   Pharmacy (be sure to add in Epic). correct

## 2023-05-05 ENCOUNTER — TELEPHONE (OUTPATIENT)
Dept: FAMILY MEDICINE CLINIC | Facility: CLINIC | Age: 68
End: 2023-05-05
Payer: MEDICARE

## 2023-05-05 NOTE — TELEPHONE ENCOUNTER
HUB TO READ:   Patient needs to reschedule his upcoming appt with  due to her being out of the office, May 8th.

## 2023-05-10 DIAGNOSIS — J44.9 END STAGE COPD: ICD-10-CM

## 2023-05-10 RX ORDER — ALBUTEROL SULFATE 2.5 MG/3ML
2.5 SOLUTION RESPIRATORY (INHALATION) EVERY 4 HOURS PRN
Qty: 540 EACH | Refills: 1 | Status: SHIPPED | OUTPATIENT
Start: 2023-05-10

## 2023-05-10 NOTE — TELEPHONE ENCOUNTER
Rx Refill Note    Requested Prescriptions     Pending Prescriptions Disp Refills   • albuterol (PROVENTIL) (2.5 MG/3ML) 0.083% nebulizer solution 540 each 1     Sig: Take 2.5 mg by nebulization Every 4 (Four) Hours As Needed for Shortness of Air.        Last office visit with prescribing clinician: 4/19/2023      Next office visit with prescribing clinician: 5/11/2023   Last labs:   Last refill: I believe patient is wanting a 90 day per Thi   Pharmacy (be sure to add in Epic). correct

## 2023-05-11 ENCOUNTER — OFFICE VISIT (OUTPATIENT)
Dept: FAMILY MEDICINE CLINIC | Facility: CLINIC | Age: 68
End: 2023-05-11
Payer: MEDICARE

## 2023-05-11 VITALS
BODY MASS INDEX: 20.38 KG/M2 | HEART RATE: 84 BPM | HEIGHT: 69 IN | DIASTOLIC BLOOD PRESSURE: 72 MMHG | OXYGEN SATURATION: 97 % | SYSTOLIC BLOOD PRESSURE: 112 MMHG | WEIGHT: 137.6 LBS

## 2023-05-11 DIAGNOSIS — I10 PRIMARY HYPERTENSION: ICD-10-CM

## 2023-05-11 DIAGNOSIS — J44.9 END STAGE COPD: ICD-10-CM

## 2023-05-11 DIAGNOSIS — E11.65 TYPE 2 DIABETES MELLITUS WITH HYPERGLYCEMIA, WITHOUT LONG-TERM CURRENT USE OF INSULIN: Primary | ICD-10-CM

## 2023-05-11 LAB
EXPIRATION DATE: NORMAL
HBA1C MFR BLD: 9.2 %
Lab: NORMAL

## 2023-05-11 PROCEDURE — 3078F DIAST BP <80 MM HG: CPT | Performed by: FAMILY MEDICINE

## 2023-05-11 PROCEDURE — 3074F SYST BP LT 130 MM HG: CPT | Performed by: FAMILY MEDICINE

## 2023-05-11 PROCEDURE — 3046F HEMOGLOBIN A1C LEVEL >9.0%: CPT | Performed by: FAMILY MEDICINE

## 2023-05-11 PROCEDURE — 83036 HEMOGLOBIN GLYCOSYLATED A1C: CPT | Performed by: FAMILY MEDICINE

## 2023-05-11 PROCEDURE — 99214 OFFICE O/P EST MOD 30 MIN: CPT | Performed by: FAMILY MEDICINE

## 2023-05-11 NOTE — PROGRESS NOTES
"Answers for HPI/ROS submitted by the patient on 5/8/2023  What is the primary reason for your visit?: Other  Please describe your symptoms.: Diabetes  Have you had these symptoms before?: Yes  How long have you been having these symptoms?: 1-4 days    Chief Complaint  Follow-up    Subjective          Iker Barber presents to [facility]  History of Present Illness  Patient is a 68-year-old male with end-stage COPD who is stable overall from a respiratory standpoint he is still having his baseline oxygen requirements.    His blood sugar was significantly elevated A1c at 12 at his last appointment he had since then been doing the Jardiance and seems to have tolerated this well.      Objective   Vital Signs:   /72   Pulse 84   Ht 175.3 cm (69.02\")   Wt 62.4 kg (137 lb 9.6 oz)   SpO2 97%   BMI 20.31 kg/m²     Body mass index is 20.31 kg/m².    Review of Systems   Constitutional: Negative.    HENT: Negative.    Eyes: Negative.    Respiratory: Positive for cough.    Cardiovascular: Negative.    Gastrointestinal: Negative.    Endocrine: Negative.    Genitourinary: Negative.    Musculoskeletal: Negative.    Skin: Negative.    Allergic/Immunologic: Negative.    Neurological: Negative.    Hematological: Negative.    Psychiatric/Behavioral: Negative.        Past History:  Medical History: has a past medical history of Anxiety, Arthritis, Asthma, Diabetes, Elevated blood pressure reading, Emphysema, unspecified, Esophageal reflux, Hearing problem, Heart disease, Hypercholesterolemia, Irritable bowel syndrome (IBS), Peripheral artery disease, Skin disorder, and Stroke (cerebrum).   Surgical History: has no past surgical history on file.   Family History: family history includes Allergies in his mother; Alzheimer's disease in his mother; Diabetes in his brother, father, and mother.   Social History: reports that he quit smoking about 10 years ago. His smoking use included cigarettes. " He started smoking about 50 years ago. He has never used smokeless tobacco. He reports that he does not currently use alcohol. He reports that he does not use drugs.      Current Outpatient Medications:   •  Accu-Chek Softclix Lancets lancets, by Other route. Use as instructed, Disp: , Rfl:   •  albuterol (ACCUNEB) 1.25 MG/3ML nebulizer solution, Take 3 mL by nebulization Every 6 (Six) Hours As Needed for Wheezing., Disp: 200 mL, Rfl: 12  •  albuterol (PROVENTIL) (2.5 MG/3ML) 0.083% nebulizer solution, Take 2.5 mg by nebulization Every 4 (Four) Hours As Needed for Shortness of Air., Disp: 540 each, Rfl: 1  •  albuterol sulfate  (90 Base) MCG/ACT inhaler, Inhale 2 puffs Every 4 (Four) Hours As Needed for Wheezing., Disp: 8 g, Rfl: 10  •  azithromycin (ZITHROMAX) 250 MG tablet, Take 1 tablet by mouth Daily., Disp: 36 tablet, Rfl: 0  •  clobetasol (TEMOVATE) 0.05 % external solution, Apply  topically to the appropriate area as directed 2 (Two) Times a Day., Disp: 50 mL, Rfl: 3  •  dilTIAZem CD (CARDIZEM CD) 240 MG 24 hr capsule, Take 1 capsule by mouth Daily., Disp: 90 capsule, Rfl: 1  •  empagliflozin (Jardiance) 25 MG tablet tablet, Take 1 tablet by mouth Daily., Disp: 30 tablet, Rfl: 3  •  fluconazole (DIFLUCAN) 150 MG tablet, Take 1 tablet by mouth Daily., Disp: 5 tablet, Rfl: 5  •  furosemide (LASIX) 40 MG tablet, Take 1 tablet by mouth Daily., Disp: 90 tablet, Rfl: 1  •  glucose blood (Accu-Chek Guide) test strip, 1 each by Other route Daily. Use as instructed, Disp: 100 each, Rfl: 2  •  levoFLOXacin (Levaquin) 500 MG tablet, Take 1 tablet by mouth Daily., Disp: 7 tablet, Rfl: 0  •  losartan (COZAAR) 100 MG tablet, Take 1 tablet by mouth Daily., Disp: 90 tablet, Rfl: 1  •  nitroglycerin (NITROSTAT) 0.4 MG SL tablet, Place 1 tablet under the tongue Every 5 (Five) Minutes As Needed for Chest Pain., Disp: 30 tablet, Rfl: 5  •  potassium chloride (K-DUR,KLOR-CON) 10 MEQ CR tablet, Take 1 tablet by mouth  Daily., Disp: 90 tablet, Rfl: 1  •  predniSONE (DELTASONE) 20 MG tablet, Take 1 tablet by mouth Daily., Disp: 90 tablet, Rfl: 0  •  Symbicort 160-4.5 MCG/ACT inhaler, Inhale 2 puffs 2 (Two) Times a Day., Disp: 3 each, Rfl: 5    Allergies: Patient has no known allergies.    Physical Exam  Vitals reviewed: Stable on 3 L of oxygen via nasal cannula no evidence of respiratory distress.   Constitutional:       Appearance: Normal appearance. He is normal weight.   HENT:      Head: Normocephalic and atraumatic.   Eyes:      Pupils: Pupils are equal, round, and reactive to light.   Cardiovascular:      Rate and Rhythm: Normal rate and regular rhythm.      Pulses: Normal pulses.      Heart sounds: Normal heart sounds.   Pulmonary:      Effort: Pulmonary effort is normal.      Breath sounds: Normal breath sounds.      Comments: Some mild scattered expiratory wheezes no evidence of rales or rhonchi  Musculoskeletal:      Cervical back: Normal range of motion and neck supple.   Skin:     General: Skin is warm and dry.   Neurological:      General: No focal deficit present.      Mental Status: He is alert. Mental status is at baseline.   Psychiatric:         Mood and Affect: Mood normal.         Behavior: Behavior normal.         Thought Content: Thought content normal.         Judgment: Judgment normal.          Result Review :                   Assessment and Plan    Diagnoses and all orders for this visit:    1. Type 2 diabetes mellitus with hyperglycemia, without long-term current use of insulin (Primary)  A1c last month was 12 it has come down to 9.2 he is doing really well on the Jardiance with good improvement of blood control of course this is good to be skewed with his end-stage COPD and the steroid use continue to monitor and recheck in 2 months or sooner if needed    2. End stage COPD  Stable respiratory exam continue oxygen use and as needed neb treatments  He is at his baseline at this time    3. Primary  hypertension  Stable continue meds monitoring    Other orders  -     empagliflozin (Jardiance) 25 MG tablet tablet; Take 1 tablet by mouth Daily.  Dispense: 30 tablet; Refill: 3      MEDICATION SIDE EFFECTS, RISKS AND SIDE EFFECTS HAVE BEEN DISCUSSED WITH PATIENT. PATIENT NOTES UNDERSTANDING. IF ANY CONCERN OR QUESTION REGARDING MEDICATION PLEASE CONTACT.     RETURN PRECAUTIONS PROVIDED TO RETURN FOR EVALUATION - IF ANY PERSISTENT LEVELS OF ELEVATED BP READINGS ESPECIALLY IF ELEVATED CONSISTENTLY ABOVE 140/90 - IF CP OR SOB GO TO ED      Follow Up   No follow-ups on file.  Patient was given instructions and counseling regarding his condition or for health maintenance advice. Please see specific information pulled into the AVS if appropriate.     Jody Gaspar DO

## 2023-07-11 PROBLEM — R60.0 BILATERAL LOWER EXTREMITY EDEMA: Status: ACTIVE | Noted: 2023-07-11

## 2023-07-11 PROBLEM — R63.4 LOSS OF WEIGHT: Status: ACTIVE | Noted: 2023-07-11

## 2023-07-11 PROBLEM — Z00.00 MEDICARE ANNUAL WELLNESS VISIT, SUBSEQUENT: Status: ACTIVE | Noted: 2023-07-11

## 2023-07-26 ENCOUNTER — OFFICE VISIT (OUTPATIENT)
Dept: FAMILY MEDICINE CLINIC | Facility: CLINIC | Age: 68
End: 2023-07-26
Payer: MEDICARE

## 2023-07-26 VITALS
OXYGEN SATURATION: 94 % | BODY MASS INDEX: 19.76 KG/M2 | SYSTOLIC BLOOD PRESSURE: 112 MMHG | HEIGHT: 69 IN | RESPIRATION RATE: 18 BRPM | WEIGHT: 133.4 LBS | DIASTOLIC BLOOD PRESSURE: 68 MMHG | HEART RATE: 76 BPM

## 2023-07-26 DIAGNOSIS — J44.9 END STAGE COPD: ICD-10-CM

## 2023-07-26 DIAGNOSIS — I10 PRIMARY HYPERTENSION: ICD-10-CM

## 2023-07-26 DIAGNOSIS — E78.2 HYPERLIPIDEMIA, MIXED: Primary | ICD-10-CM

## 2023-07-26 DIAGNOSIS — E11.65 TYPE 2 DIABETES MELLITUS WITH HYPERGLYCEMIA, WITHOUT LONG-TERM CURRENT USE OF INSULIN: ICD-10-CM

## 2023-07-26 DIAGNOSIS — R60.0 BILATERAL LOWER EXTREMITY EDEMA: ICD-10-CM

## 2023-07-26 PROCEDURE — 3078F DIAST BP <80 MM HG: CPT | Performed by: FAMILY MEDICINE

## 2023-07-26 PROCEDURE — 3074F SYST BP LT 130 MM HG: CPT | Performed by: FAMILY MEDICINE

## 2023-07-26 PROCEDURE — 3051F HG A1C>EQUAL 7.0%<8.0%: CPT | Performed by: FAMILY MEDICINE

## 2023-07-26 PROCEDURE — 99214 OFFICE O/P EST MOD 30 MIN: CPT | Performed by: FAMILY MEDICINE

## 2023-07-26 RX ORDER — AZITHROMYCIN 250 MG/1
250 TABLET, FILM COATED ORAL DAILY
Qty: 36 TABLET | Refills: 2 | Status: SHIPPED | OUTPATIENT
Start: 2023-07-26

## 2023-07-26 RX ORDER — POTASSIUM CHLORIDE 750 MG/1
10 TABLET, EXTENDED RELEASE ORAL DAILY
Qty: 90 TABLET | Refills: 1 | Status: SHIPPED | OUTPATIENT
Start: 2023-07-26

## 2023-07-26 RX ORDER — FLUCONAZOLE 150 MG/1
150 TABLET ORAL DAILY
Qty: 10 TABLET | Refills: 5 | Status: SHIPPED | OUTPATIENT
Start: 2023-07-26

## 2023-07-26 RX ORDER — MEGESTROL ACETATE 40 MG/ML
800 SUSPENSION ORAL DAILY
Qty: 600 ML | Refills: 11 | Status: SHIPPED | OUTPATIENT
Start: 2023-07-26

## 2023-07-26 RX ORDER — LOSARTAN POTASSIUM 100 MG/1
100 TABLET ORAL DAILY
Qty: 90 TABLET | Refills: 1 | Status: SHIPPED | OUTPATIENT
Start: 2023-07-26

## 2023-07-26 RX ORDER — NITROGLYCERIN 0.4 MG/1
0.4 TABLET SUBLINGUAL
Qty: 30 TABLET | Refills: 5 | Status: SHIPPED | OUTPATIENT
Start: 2023-07-26

## 2023-07-26 RX ORDER — ALBUTEROL SULFATE 2.5 MG/3ML
2.5 SOLUTION RESPIRATORY (INHALATION) EVERY 4 HOURS PRN
Qty: 540 EACH | Refills: 1 | Status: SHIPPED | OUTPATIENT
Start: 2023-07-26

## 2023-07-26 RX ORDER — PRAVASTATIN SODIUM 40 MG
40 TABLET ORAL DAILY
Qty: 90 TABLET | Refills: 1 | Status: SHIPPED | OUTPATIENT
Start: 2023-07-26

## 2023-07-26 RX ORDER — ALBUTEROL SULFATE 90 UG/1
2 AEROSOL, METERED RESPIRATORY (INHALATION) EVERY 4 HOURS PRN
Qty: 8 G | Refills: 10 | Status: SHIPPED | OUTPATIENT
Start: 2023-07-26

## 2023-07-26 RX ORDER — FUROSEMIDE 40 MG/1
40 TABLET ORAL DAILY
Qty: 90 TABLET | Refills: 1 | Status: SHIPPED | OUTPATIENT
Start: 2023-07-26

## 2023-07-26 RX ORDER — BLOOD SUGAR DIAGNOSTIC
1 STRIP MISCELLANEOUS DAILY
Qty: 100 EACH | Refills: 2 | Status: SHIPPED | OUTPATIENT
Start: 2023-07-26

## 2023-07-26 RX ORDER — DILTIAZEM HYDROCHLORIDE 240 MG/1
240 CAPSULE, COATED, EXTENDED RELEASE ORAL DAILY
Qty: 90 CAPSULE | Refills: 1 | Status: SHIPPED | OUTPATIENT
Start: 2023-07-26

## 2023-07-26 RX ORDER — BUDESONIDE AND FORMOTEROL FUMARATE DIHYDRATE 160; 4.5 UG/1; UG/1
2 AEROSOL RESPIRATORY (INHALATION)
Qty: 3 EACH | Refills: 5 | Status: SHIPPED | OUTPATIENT
Start: 2023-07-26

## 2023-07-26 NOTE — PROGRESS NOTES
Patient Name: Iker Barber  : 1955   MRN: [id number]     Chief Complaint:    Chief Complaint   Patient presents with    Edema    Thrush       History of Present Illness: Iker Barber is a 68 y.o. male who is here today for follow up.  HPI        Review of Systems:   Review of Systems   Constitutional: Negative.    HENT: Negative.     Eyes: Negative.    Respiratory: Negative.     Cardiovascular: Negative.    Gastrointestinal: Negative.    Neurological: Negative.       Past Medical History:   Past Medical History:   Diagnosis Date    Anxiety     Arthritis     Asthma     Diabetes     Elevated blood pressure reading     Emphysema, unspecified     Esophageal reflux     Hearing problem     Heart disease     Hypercholesterolemia     Irritable bowel syndrome (IBS)     Peripheral artery disease     Skin disorder     Stroke (cerebrum)        Past Surgical History: No past surgical history on file.    Family History:   Family History   Problem Relation Age of Onset    Allergies Mother     Alzheimer's disease Mother     Diabetes Mother     Diabetes Father     Diabetes Brother        Social History:   Social History     Socioeconomic History    Marital status:    Tobacco Use    Smoking status: Former     Years: 40.00     Types: Cigarettes     Start date:      Quit date:      Years since quitting: 10.5    Smokeless tobacco: Never   Substance and Sexual Activity    Alcohol use: Not Currently    Drug use: Never    Sexual activity: Not Currently     Partners: Female       Medications:     Current Outpatient Medications:     Accu-Chek Softclix Lancets lancets, by Other route. Use as instructed, Disp: , Rfl:     albuterol (PROVENTIL) (2.5 MG/3ML) 0.083% nebulizer solution, Take 2.5 mg by nebulization Every 4 (Four) Hours As Needed for Shortness of Air., Disp: 540 each, Rfl: 1    albuterol sulfate  (90 Base) MCG/ACT inhaler, Inhale 2 puffs Every 4 (Four) Hours As Needed for Wheezing.,  Disp: 8 g, Rfl: 10    azithromycin (ZITHROMAX) 250 MG tablet, Take 1 tablet by mouth Daily. Pt is to take 1 po mon wen fri., Disp: 36 tablet, Rfl: 2    clobetasol (TEMOVATE) 0.05 % external solution, Apply  topically to the appropriate area as directed 2 (Two) Times a Day., Disp: 50 mL, Rfl: 3    dilTIAZem CD (CARDIZEM CD) 240 MG 24 hr capsule, Take 1 capsule by mouth Daily., Disp: 90 capsule, Rfl: 1    fluconazole (DIFLUCAN) 150 MG tablet, Take 1 tablet by mouth Daily., Disp: 10 tablet, Rfl: 5    furosemide (LASIX) 40 MG tablet, Take 1 tablet by mouth Daily., Disp: 90 tablet, Rfl: 1    glucose blood (Accu-Chek Guide) test strip, 1 each by Other route Daily. Use as instructed, Disp: 100 each, Rfl: 2    levoFLOXacin (Levaquin) 500 MG tablet, Take 1 tablet by mouth Daily., Disp: 7 tablet, Rfl: 0    losartan (COZAAR) 100 MG tablet, Take 1 tablet by mouth Daily., Disp: 90 tablet, Rfl: 1    nitroglycerin (NITROSTAT) 0.4 MG SL tablet, Place 1 tablet under the tongue Every 5 (Five) Minutes As Needed for Chest Pain., Disp: 30 tablet, Rfl: 5    potassium chloride (K-DUR,KLOR-CON) 10 MEQ CR tablet, Take 1 tablet by mouth Daily., Disp: 90 tablet, Rfl: 1    Symbicort 160-4.5 MCG/ACT inhaler, Inhale 2 puffs 2 (Two) Times a Day., Disp: 3 each, Rfl: 5    albuterol (ACCUNEB) 1.25 MG/3ML nebulizer solution, Take 3 mL by nebulization Every 6 (Six) Hours As Needed for Wheezing. (Patient not taking: Reported on 7/11/2023), Disp: 200 mL, Rfl: 12    empagliflozin (Jardiance) 25 MG tablet tablet, Take 1 tablet by mouth Daily. (Patient not taking: Reported on 7/11/2023), Disp: 30 tablet, Rfl: 3    glipizide (GLUCOTROL XL) 5 MG ER tablet, , Disp: , Rfl:     megestrol (MEGACE) 40 MG/ML suspension, Take 20 mL by mouth Daily., Disp: 600 mL, Rfl: 11    pravastatin (PRAVACHOL) 40 MG tablet, Take 1 tablet by mouth Daily., Disp: 90 tablet, Rfl: 1    Allergies:   No Known Allergies      Physical Exam:  Vital Signs:   Vitals:    07/26/23 1414   BP:  "112/68   BP Location: Left arm   Patient Position: Sitting   Cuff Size: Adult   Pulse: 76   Resp: 18   SpO2: 94%   Weight: 60.5 kg (133 lb 6.4 oz)   Height: 175.3 cm (69.02\")     Body mass index is 19.69 kg/m².     Physical Exam  Vitals and nursing note reviewed.   Constitutional:       Appearance: Normal appearance. He is ill-appearing.   HENT:      Head: Normocephalic and atraumatic.      Right Ear: Tympanic membrane, ear canal and external ear normal.      Left Ear: Tympanic membrane, ear canal and external ear normal.      Nose: Nose normal.      Mouth/Throat:      Mouth: Mucous membranes are dry.      Pharynx: Oropharynx is clear.   Eyes:      Extraocular Movements: Extraocular movements intact.      Conjunctiva/sclera: Conjunctivae normal.      Pupils: Pupils are equal, round, and reactive to light.   Cardiovascular:      Rate and Rhythm: Normal rate and regular rhythm.      Pulses: Normal pulses.      Heart sounds: Normal heart sounds.   Pulmonary:      Effort: Pulmonary effort is normal.      Breath sounds: Normal breath sounds.   Musculoskeletal:      Cervical back: Normal range of motion and neck supple.   Feet:      Comments:      Neurological:      Mental Status: He is alert.       Procedures      Assessment/Plan:   Diagnoses and all orders for this visit:    1. Hyperlipidemia, mixed (Primary)  Assessment & Plan:  We will start pravastatin.  Recheck in 3 months    Orders:  -     Hemoglobin A1c; Future  -     Lipid Panel; Future  -     Comprehensive Metabolic Panel; Future  -     CBC & Differential; Future  -     TSH Rfx On Abnormal To Free T4; Future    2. Type 2 diabetes mellitus with hyperglycemia, without long-term current use of insulin  Assessment & Plan:  A1c is 7.9.  Much improved.  Recheck Blood work in 3 months.    Orders:  -     POCT microalbumin  -     glucose blood (Accu-Chek Guide) test strip; 1 each by Other route Daily. Use as instructed  Dispense: 100 each; Refill: 2  -     Hemoglobin " A1c; Future  -     Lipid Panel; Future  -     Comprehensive Metabolic Panel; Future  -     CBC & Differential; Future  -     TSH Rfx On Abnormal To Free T4; Future    3. Bilateral lower extremity edema  -     potassium chloride (K-DUR,KLOR-CON) 10 MEQ CR tablet; Take 1 tablet by mouth Daily.  Dispense: 90 tablet; Refill: 1  -     Hemoglobin A1c; Future  -     Lipid Panel; Future  -     Comprehensive Metabolic Panel; Future  -     CBC & Differential; Future  -     TSH Rfx On Abnormal To Free T4; Future    4. Primary hypertension  Assessment & Plan:  Discussed with patient to monitor their blood pressure and if systolic blood pressure goes above 140 or diastolic is above 90 to return to clinic.  Take medicines as directed, call for any problems, patient not having or any worrisome symptoms.      Orders:  -     losartan (COZAAR) 100 MG tablet; Take 1 tablet by mouth Daily.  Dispense: 90 tablet; Refill: 1  -     Hemoglobin A1c; Future  -     Lipid Panel; Future  -     Comprehensive Metabolic Panel; Future  -     CBC & Differential; Future  -     TSH Rfx On Abnormal To Free T4; Future    5. End stage COPD  Assessment & Plan:  Patient is on O2.  He had been on BiPAP before but could not tolerate coming off it.  He recognizes he is on end-stage.  Check in 3 months    Orders:  -     albuterol (PROVENTIL) (2.5 MG/3ML) 0.083% nebulizer solution; Take 2.5 mg by nebulization Every 4 (Four) Hours As Needed for Shortness of Air.  Dispense: 540 each; Refill: 1  -     Hemoglobin A1c; Future  -     Lipid Panel; Future  -     Comprehensive Metabolic Panel; Future  -     CBC & Differential; Future  -     TSH Rfx On Abnormal To Free T4; Future    Other orders  -     azithromycin (ZITHROMAX) 250 MG tablet; Take 1 tablet by mouth Daily. Pt is to take 1 po mon wen fri.  Dispense: 36 tablet; Refill: 2  -     Symbicort 160-4.5 MCG/ACT inhaler; Inhale 2 puffs 2 (Two) Times a Day.  Dispense: 3 each; Refill: 5  -     nitroglycerin (NITROSTAT)  0.4 MG SL tablet; Place 1 tablet under the tongue Every 5 (Five) Minutes As Needed for Chest Pain.  Dispense: 30 tablet; Refill: 5  -     furosemide (LASIX) 40 MG tablet; Take 1 tablet by mouth Daily.  Dispense: 90 tablet; Refill: 1  -     dilTIAZem CD (CARDIZEM CD) 240 MG 24 hr capsule; Take 1 capsule by mouth Daily.  Dispense: 90 capsule; Refill: 1  -     albuterol sulfate  (90 Base) MCG/ACT inhaler; Inhale 2 puffs Every 4 (Four) Hours As Needed for Wheezing.  Dispense: 8 g; Refill: 10  -     pravastatin (PRAVACHOL) 40 MG tablet; Take 1 tablet by mouth Daily.  Dispense: 90 tablet; Refill: 1  -     fluconazole (DIFLUCAN) 150 MG tablet; Take 1 tablet by mouth Daily.  Dispense: 10 tablet; Refill: 5  -     megestrol (MEGACE) 40 MG/ML suspension; Take 20 mL by mouth Daily.  Dispense: 600 mL; Refill: 11             Follow Up:   No follow-ups on file.    Betito Bowen MD  [facility]

## 2023-07-26 NOTE — ASSESSMENT & PLAN NOTE
Discussed with patient to monitor their blood pressure and if systolic blood pressure goes above 140 or diastolic is above 90 to return to clinic.  Take medicines as directed, call for any problems, patient not having or any worrisome symptoms.

## 2023-10-12 ENCOUNTER — OFFICE VISIT (OUTPATIENT)
Dept: FAMILY MEDICINE CLINIC | Facility: CLINIC | Age: 68
End: 2023-10-12
Payer: MEDICARE

## 2023-10-12 VITALS
WEIGHT: 137.7 LBS | OXYGEN SATURATION: 92 % | HEART RATE: 68 BPM | RESPIRATION RATE: 16 BRPM | SYSTOLIC BLOOD PRESSURE: 122 MMHG | HEIGHT: 69 IN | BODY MASS INDEX: 20.4 KG/M2 | DIASTOLIC BLOOD PRESSURE: 84 MMHG

## 2023-10-12 DIAGNOSIS — E11.65 TYPE 2 DIABETES MELLITUS WITH HYPERGLYCEMIA, WITHOUT LONG-TERM CURRENT USE OF INSULIN: ICD-10-CM

## 2023-10-12 DIAGNOSIS — I10 PRIMARY HYPERTENSION: Primary | ICD-10-CM

## 2023-10-12 DIAGNOSIS — J44.9 END STAGE COPD: ICD-10-CM

## 2023-10-12 PROCEDURE — 3051F HG A1C>EQUAL 7.0%<8.0%: CPT | Performed by: FAMILY MEDICINE

## 2023-10-12 PROCEDURE — 3079F DIAST BP 80-89 MM HG: CPT | Performed by: FAMILY MEDICINE

## 2023-10-12 PROCEDURE — 99214 OFFICE O/P EST MOD 30 MIN: CPT | Performed by: FAMILY MEDICINE

## 2023-10-12 PROCEDURE — 3074F SYST BP LT 130 MM HG: CPT | Performed by: FAMILY MEDICINE

## 2023-10-12 RX ORDER — ALBUTEROL SULFATE 90 UG/1
2 AEROSOL, METERED RESPIRATORY (INHALATION) EVERY 4 HOURS PRN
Qty: 8 G | Refills: 10 | Status: SHIPPED | OUTPATIENT
Start: 2023-10-12

## 2023-10-12 NOTE — PROGRESS NOTES
Patient Name: Iker Barber  : 1955   MRN: [id number]     Chief Complaint:  recheck on breathing medicine  Chief Complaint   Patient presents with    Follow-up       History of Present Illness: Iker Barber is a 68 y.o. male who is here today for follow up.  HPI        Review of Systems:   Review of Systems   Constitutional: Negative.    HENT: Negative.     Eyes: Negative.    Respiratory:  Positive for shortness of breath.    Cardiovascular: Negative.    Gastrointestinal: Negative.    Neurological: Negative.         Past Medical History:   Past Medical History:   Diagnosis Date    Anxiety     Arthritis     Asthma     Diabetes     Elevated blood pressure reading     Emphysema, unspecified     Esophageal reflux     Hearing problem     Heart disease     Hypercholesterolemia     Irritable bowel syndrome (IBS)     Peripheral artery disease     Skin disorder     Stroke (cerebrum)        Past Surgical History: No past surgical history on file.    Family History:   Family History   Problem Relation Age of Onset    Allergies Mother     Alzheimer's disease Mother     Diabetes Mother     Diabetes Father     Diabetes Brother        Social History:   Social History     Socioeconomic History    Marital status:    Tobacco Use    Smoking status: Former     Years: 40     Types: Cigarettes     Start date:      Quit date:      Years since quitting: 10.7    Smokeless tobacco: Never   Substance and Sexual Activity    Alcohol use: Not Currently    Drug use: Never    Sexual activity: Not Currently     Partners: Female       Medications:     Current Outpatient Medications:     Accu-Chek Softclix Lancets lancets, by Other route. Use as instructed, Disp: , Rfl:     albuterol (PROVENTIL) (2.5 MG/3ML) 0.083% nebulizer solution, Take 2.5 mg by nebulization Every 4 (Four) Hours As Needed for Shortness of Air., Disp: 540 each, Rfl: 1    albuterol sulfate  (90 Base) MCG/ACT inhaler, Inhale 2 puffs  "Every 4 (Four) Hours As Needed for Wheezing., Disp: 8 g, Rfl: 10    clobetasol (TEMOVATE) 0.05 % external solution, Apply  topically to the appropriate area as directed 2 (Two) Times a Day., Disp: 50 mL, Rfl: 3    dilTIAZem CD (CARDIZEM CD) 240 MG 24 hr capsule, Take 1 capsule by mouth Daily., Disp: 90 capsule, Rfl: 1    fluconazole (DIFLUCAN) 150 MG tablet, Take 1 tablet by mouth Daily., Disp: 10 tablet, Rfl: 5    furosemide (LASIX) 40 MG tablet, Take 1 tablet by mouth Daily., Disp: 90 tablet, Rfl: 1    glucose blood (Accu-Chek Guide) test strip, 1 each by Other route Daily. Use as instructed, Disp: 100 each, Rfl: 2    losartan (COZAAR) 100 MG tablet, Take 1 tablet by mouth Daily., Disp: 90 tablet, Rfl: 1    megestrol (MEGACE) 40 MG/ML suspension, Take 20 mL by mouth Daily., Disp: 600 mL, Rfl: 11    nitroglycerin (NITROSTAT) 0.4 MG SL tablet, Place 1 tablet under the tongue Every 5 (Five) Minutes As Needed for Chest Pain., Disp: 30 tablet, Rfl: 5    potassium chloride (K-DUR,KLOR-CON) 10 MEQ CR tablet, Take 1 tablet by mouth Daily., Disp: 90 tablet, Rfl: 1    pravastatin (PRAVACHOL) 40 MG tablet, Take 1 tablet by mouth Daily., Disp: 90 tablet, Rfl: 1    Symbicort 160-4.5 MCG/ACT inhaler, Inhale 2 puffs 2 (Two) Times a Day., Disp: 3 each, Rfl: 5    Allergies:   No Known Allergies      Physical Exam:  Vital Signs:   Vitals:    10/12/23 1037   BP: 122/84   BP Location: Left arm   Patient Position: Sitting   Cuff Size: Adult   Pulse: 68   Resp: 16   SpO2: 92%   Weight: 62.5 kg (137 lb 11.2 oz)   Height: 175.3 cm (69.02\")     Body mass index is 20.33 kg/mý.     Physical Exam  Vitals and nursing note reviewed.   Constitutional:       Appearance: Normal appearance. He is normal weight.   HENT:      Head: Normocephalic and atraumatic.      Right Ear: Tympanic membrane, ear canal and external ear normal.      Left Ear: Tympanic membrane, ear canal and external ear normal.      Nose: Nose normal.      Mouth/Throat:      " Mouth: Mucous membranes are dry.      Pharynx: Oropharynx is clear.   Eyes:      Extraocular Movements: Extraocular movements intact.      Conjunctiva/sclera: Conjunctivae normal.      Pupils: Pupils are equal, round, and reactive to light.   Cardiovascular:      Rate and Rhythm: Normal rate and regular rhythm.      Pulses: Normal pulses.      Heart sounds: Normal heart sounds.   Pulmonary:      Effort: Pulmonary effort is normal.      Breath sounds: Normal breath sounds.   Musculoskeletal:      Cervical back: Normal range of motion and neck supple.   Feet:      Comments:      Neurological:      Mental Status: He is alert.         Procedures      Assessment/Plan:   Diagnoses and all orders for this visit:    1. Primary hypertension (Primary)  Assessment & Plan:  Discussed with patient to monitor their blood pressure and if systolic blood pressure goes above 140 or diastolic is above 90 to return to clinic.  Take medicines as directed, call for any problems, patient not having or any worrisome symptoms.          2. End stage COPD  Assessment & Plan:  Patient is having trouble paying for meds.  I am going give him some Trelegy samples and some Breztri samples.  Return to clinic in 2 months      3. Type 2 diabetes mellitus with hyperglycemia, without long-term current use of insulin  Assessment & Plan:  We will get blood work in 2 months.  Patient's insurance will not pay for the Jardiance      Other orders  -     albuterol sulfate  (90 Base) MCG/ACT inhaler; Inhale 2 puffs Every 4 (Four) Hours As Needed for Wheezing.  Dispense: 8 g; Refill: 10             Follow Up:   Return in about 2 months (around 12/12/2023) for Annual physical, Bloodwork 1 week prior to next appointment.    Betito Bowen MD  [facility] [place] [facility]

## 2023-10-12 NOTE — ASSESSMENT & PLAN NOTE
Patient is having trouble paying for meds.  I am going give him some Trelegy samples and some Breztri samples.  Return to clinic in 2 months

## 2023-10-30 ENCOUNTER — TELEPHONE (OUTPATIENT)
Dept: FAMILY MEDICINE CLINIC | Facility: CLINIC | Age: 68
End: 2023-10-30
Payer: MEDICARE

## 2023-12-11 ENCOUNTER — TELEPHONE (OUTPATIENT)
Dept: FAMILY MEDICINE CLINIC | Facility: CLINIC | Age: 68
End: 2023-12-11

## 2023-12-11 NOTE — TELEPHONE ENCOUNTER
Caller: Iker Barber    Relationship: Self    Best call back number: [phone]    What was the call regarding:     THIS IS IINFO ONLY    PATIENT CALLED TO SEE IF HE HAD AN APPOINTMENT ON 12/12.  HE STATED HE CALLED TO CANCEL THE BLOOD WORK ONLY BECAUSE HIS INSURANCE STATED THEY WOULD NOT PAY FOR ANYMORE BLOOD WORK THIS YEAR    PATIENT STATED HE DOES NOT NEED A PHYSICAL.  HE SAID HIS INSURANCE DOES NOT COVER PHYSICALS    HE SAID HE NEEDS AN APPOINTMENT FOR HIS KIDNEYS.    ASK PATIENT ABOUT HIS INSURANCE AND HE SAID [employer] IS PRIMARY AND [employer] IS SECONDARY    PATIENT SAID ONLY HIS BLOOD WORK SHOULD HAVE BEEN CANCELLED NOT HIS APPOINTMENT    HUB ADVISED ABOUT [facility] BEING OON.  PATIENT IS GOING TO CALL [employer] TO SEE WHAT IS GOING ON.  HE WILL CALL BACK

## 2023-12-19 ENCOUNTER — TELEPHONE (OUTPATIENT)
Dept: FAMILY MEDICINE CLINIC | Facility: CLINIC | Age: 68
End: 2023-12-19

## 2023-12-19 ENCOUNTER — OFFICE VISIT (OUTPATIENT)
Dept: FAMILY MEDICINE CLINIC | Facility: CLINIC | Age: 68
End: 2023-12-19
Payer: MEDICARE

## 2023-12-19 VITALS
HEIGHT: 69 IN | TEMPERATURE: 97.5 F | SYSTOLIC BLOOD PRESSURE: 120 MMHG | RESPIRATION RATE: 16 BRPM | BODY MASS INDEX: 21.27 KG/M2 | HEART RATE: 60 BPM | DIASTOLIC BLOOD PRESSURE: 60 MMHG | WEIGHT: 143.6 LBS | OXYGEN SATURATION: 98 %

## 2023-12-19 DIAGNOSIS — E11.65 TYPE 2 DIABETES MELLITUS WITH HYPERGLYCEMIA, WITHOUT LONG-TERM CURRENT USE OF INSULIN: ICD-10-CM

## 2023-12-19 DIAGNOSIS — R30.0 DYSURIA: Primary | ICD-10-CM

## 2023-12-19 DIAGNOSIS — I10 PRIMARY HYPERTENSION: ICD-10-CM

## 2023-12-19 DIAGNOSIS — B37.0 CANDIDA INFECTION, ORAL: ICD-10-CM

## 2023-12-19 DIAGNOSIS — J44.9 END STAGE COPD: ICD-10-CM

## 2023-12-19 LAB
BILIRUB BLD-MCNC: NEGATIVE MG/DL
CLARITY, POC: CLEAR
COLOR UR: YELLOW
EXPIRATION DATE: ABNORMAL
EXPIRATION DATE: ABNORMAL
GLUCOSE UR STRIP-MCNC: ABNORMAL MG/DL
HBA1C MFR BLD: 6.9 % (ref 4.5–5.7)
KETONES UR QL: NEGATIVE
LEUKOCYTE EST, POC: NEGATIVE
Lab: ABNORMAL
Lab: ABNORMAL
NITRITE UR-MCNC: NEGATIVE MG/ML
PH UR: 5.5 [PH] (ref 5–8)
PROT UR STRIP-MCNC: NEGATIVE MG/DL
RBC # UR STRIP: ABNORMAL /UL
SP GR UR: 1 (ref 1–1.03)
UROBILINOGEN UR QL: NORMAL

## 2023-12-19 RX ORDER — AZITHROMYCIN 250 MG/1
250 TABLET, FILM COATED ORAL EVERY OTHER DAY
Qty: 15 TABLET | Refills: 5 | Status: SHIPPED | OUTPATIENT
Start: 2023-12-19

## 2023-12-19 RX ORDER — GLIPIZIDE 5 MG/1
5 TABLET ORAL
Qty: 180 TABLET | Refills: 1 | Status: SHIPPED | OUTPATIENT
Start: 2023-12-19

## 2023-12-19 RX ORDER — PREDNISONE 10 MG/1
10 TABLET ORAL DAILY
Qty: 30 TABLET | Refills: 0 | Status: SHIPPED | OUTPATIENT
Start: 2023-12-19

## 2023-12-19 RX ORDER — FLUCONAZOLE 150 MG/1
150 TABLET ORAL DAILY
Qty: 10 TABLET | Refills: 5 | Status: SHIPPED | OUTPATIENT
Start: 2023-12-19

## 2023-12-19 RX ORDER — AZITHROMYCIN 250 MG/1
250 TABLET, FILM COATED ORAL EVERY OTHER DAY
COMMUNITY
End: 2023-12-19 | Stop reason: SDUPTHER

## 2023-12-19 NOTE — ASSESSMENT & PLAN NOTE
Patient is having some trouble breathing.  His pulmonologist has retired.  He does not want to see another pulmonologist.  He is on nasal cannula oxygen.  We are going to call him in some prednisone.  He is interested in talking to hospice.  Will get the appointment for him

## 2023-12-19 NOTE — TELEPHONE ENCOUNTER
Caller: CATINA    Relationship:     Best call back number: [phone] OPTION 2    What is the best time to reach you: ANY TIME    Who are you requesting to speak with (clinical staff, provider,  specific staff member): DR PRICE    Do you know the name of the person who called: CATINA    What was the call regarding: CATINA FROM HOSPICE CARE WANTS TO KNOW IF DR PRICE IS GOING TO STAY ON AS PATIENT PHYSICIAN? PLEASE ADVISE

## 2023-12-19 NOTE — PROGRESS NOTES
Patient Name: Iker Babrer  : 1955   MRN: [id number]     Chief Complaint:    Chief Complaint   Patient presents with    Urinary Frequency     Patient states that he has been having urinary frequency for about a month or longer. Patient states that he is going every 15 mins or so.        History of Present Illness: Iker Barber is a 68 y.o. male who is here today for follow up.  Urinary Frequency   Associated symptoms include frequency.           Review of Systems:   Review of Systems   Constitutional: Negative.    HENT: Negative.     Eyes: Negative.    Respiratory: Negative.     Cardiovascular: Negative.    Gastrointestinal: Negative.    Genitourinary:  Positive for frequency.   Neurological: Negative.         Past Medical History:   Past Medical History:   Diagnosis Date    Anxiety     Arthritis     Asthma     Diabetes     Elevated blood pressure reading     Emphysema, unspecified     Esophageal reflux     Hearing problem     Heart disease     Hypercholesterolemia     Irritable bowel syndrome (IBS)     Peripheral artery disease     Skin disorder     Stroke (cerebrum)        Past Surgical History: No past surgical history on file.    Family History:   Family History   Problem Relation Age of Onset    Allergies Mother     Alzheimer's disease Mother     Diabetes Mother     Diabetes Father     Diabetes Brother        Social History:   Social History     Socioeconomic History    Marital status:    Tobacco Use    Smoking status: Former     Years: 40     Types: Cigarettes     Start date:      Quit date:      Years since quitting: 10.9    Smokeless tobacco: Never   Substance and Sexual Activity    Alcohol use: Not Currently    Drug use: Never    Sexual activity: Not Currently     Partners: Female       Medications:     Current Outpatient Medications:     Accu-Chek Softclix Lancets lancets, by Other route. Use as instructed, Disp: , Rfl:     albuterol (PROVENTIL) (2.5 MG/3ML) 0.083%  nebulizer solution, Take 2.5 mg by nebulization Every 4 (Four) Hours As Needed for Shortness of Air., Disp: 540 each, Rfl: 1    albuterol sulfate  (90 Base) MCG/ACT inhaler, Inhale 2 puffs Every 4 (Four) Hours As Needed for Wheezing., Disp: 8 g, Rfl: 10    azithromycin (ZITHROMAX) 250 MG tablet, Take 1 tablet by mouth Every Other Day. Indications: COPD Exacerbation Suppression, Disp: 15 tablet, Rfl: 5    clobetasol (TEMOVATE) 0.05 % external solution, Apply  topically to the appropriate area as directed 2 (Two) Times a Day., Disp: 50 mL, Rfl: 3    dilTIAZem CD (CARDIZEM CD) 240 MG 24 hr capsule, Take 1 capsule by mouth Daily., Disp: 90 capsule, Rfl: 1    fluconazole (DIFLUCAN) 150 MG tablet, Take 1 tablet by mouth Daily., Disp: 10 tablet, Rfl: 5    furosemide (LASIX) 40 MG tablet, Take 1 tablet by mouth Daily., Disp: 90 tablet, Rfl: 1    glucose blood (Accu-Chek Guide) test strip, 1 each by Other route Daily. Use as instructed, Disp: 100 each, Rfl: 2    losartan (COZAAR) 100 MG tablet, Take 1 tablet by mouth Daily., Disp: 90 tablet, Rfl: 1    megestrol (MEGACE) 40 MG/ML suspension, Take 20 mL by mouth Daily., Disp: 600 mL, Rfl: 11    nitroglycerin (NITROSTAT) 0.4 MG SL tablet, Place 1 tablet under the tongue Every 5 (Five) Minutes As Needed for Chest Pain., Disp: 30 tablet, Rfl: 5    potassium chloride (K-DUR,KLOR-CON) 10 MEQ CR tablet, Take 1 tablet by mouth Daily., Disp: 90 tablet, Rfl: 1    pravastatin (PRAVACHOL) 40 MG tablet, Take 1 tablet by mouth Daily., Disp: 90 tablet, Rfl: 1    Symbicort 160-4.5 MCG/ACT inhaler, Inhale 2 puffs 2 (Two) Times a Day., Disp: 3 each, Rfl: 5    glipizide (Glucotrol) 5 MG tablet, Take 1 tablet by mouth 2 (Two) Times a Day Before Meals., Disp: 180 tablet, Rfl: 1    nystatin (MYCOSTATIN) 100,000 unit/mL suspension, Swish and swallow 5 mL 4 (Four) Times a Day., Disp: 473 mL, Rfl: 5    predniSONE (DELTASONE) 10 MG tablet, Take 1 tablet by mouth Daily. 5 po once a day for 2  "days, 4 po once a day for 2 days, 3 po once a day for 2 days, 2 po once a day for 2 days, 1 po once a day for 2 days, Disp: 30 tablet, Rfl: 0    Allergies:   No Known Allergies      Physical Exam:  Vital Signs:   Vitals:    12/19/23 1115   BP: 120/60   BP Location: Left arm   Patient Position: Sitting   Cuff Size: Adult   Pulse: 60   Resp: 16   Temp: 97.5 °F (36.4 °C)   TempSrc: Oral   SpO2: 98%   Weight: 65.1 kg (143 lb 9.6 oz)   Height: 175.3 cm (69.02\")   PainSc: 0-No pain     Body mass index is 21.2 kg/m².     Physical Exam  Vitals and nursing note reviewed.   Constitutional:       Appearance: Normal appearance. He is normal weight.   HENT:      Head: Normocephalic and atraumatic.      Right Ear: Tympanic membrane, ear canal and external ear normal.      Left Ear: Tympanic membrane, ear canal and external ear normal.      Nose: Nose normal.      Mouth/Throat:      Mouth: Mucous membranes are dry.      Pharynx: Oropharynx is clear.   Eyes:      Extraocular Movements: Extraocular movements intact.      Conjunctiva/sclera: Conjunctivae normal.      Pupils: Pupils are equal, round, and reactive to light.   Cardiovascular:      Rate and Rhythm: Normal rate and regular rhythm.      Pulses: Normal pulses.      Heart sounds: Normal heart sounds.   Pulmonary:      Effort: Pulmonary effort is normal.      Breath sounds: Normal breath sounds.   Musculoskeletal:      Cervical back: Normal range of motion and neck supple.   Feet:      Comments:      Neurological:      Mental Status: He is alert.           ECG 12 Lead    Date/Time: 12/19/2023 12:47 PM  Performed by: Betito Bowen MD    Authorized by: Betito Bowen MD  Comparison: not compared with previous ECG   Rhythm: sinus rhythm  Ectopy: multifocal PVCs  Rate: tachycardic  Conduction: conduction normal  ST Segments: ST segments normal  T Waves: T waves normal  Other findings: non-specific ST-T wave changes    Clinical impression: non-specific ECG  Comments: " Multiple PVC's, no acute sign of ischemia            Assessment/Plan:   Diagnoses and all orders for this visit:    1. Dysuria (Primary)  Assessment & Plan:  Going to culture his urine.    Orders:  -     POC Urinalysis Dipstick, Automated    2. Primary hypertension  Assessment & Plan:  Discussed with patient to monitor their blood pressure and if systolic blood pressure goes above 140 or diastolic is above 90 to return to clinic.  Take medicines as directed, call for any problems, patient not having or any worrisome symptoms.          3. Type 2 diabetes mellitus with hyperglycemia, without long-term current use of insulin  Assessment & Plan:  A1c is 9.8.  We will restart his glipizide per his request.    Orders:  -     POC Glycosylated Hemoglobin (Hb A1C)    4. End stage COPD  Assessment & Plan:  Patient is having some trouble breathing.  His pulmonologist has retired.  He does not want to see another pulmonologist.  He is on nasal cannula oxygen.  We are going to call him in some prednisone.  He is interested in talking to hospice.  Will get the appointment for him    Orders:  -     Ambulatory Referral to Home Hospice    5. Candida infection, oral  Assessment & Plan:  Start nystatin.      Other orders  -     fluconazole (DIFLUCAN) 150 MG tablet; Take 1 tablet by mouth Daily.  Dispense: 10 tablet; Refill: 5  -     azithromycin (ZITHROMAX) 250 MG tablet; Take 1 tablet by mouth Every Other Day. Indications: COPD Exacerbation Suppression  Dispense: 15 tablet; Refill: 5  -     nystatin (MYCOSTATIN) 100,000 unit/mL suspension; Swish and swallow 5 mL 4 (Four) Times a Day.  Dispense: 473 mL; Refill: 5  -     glipizide (Glucotrol) 5 MG tablet; Take 1 tablet by mouth 2 (Two) Times a Day Before Meals.  Dispense: 180 tablet; Refill: 1  -     predniSONE (DELTASONE) 10 MG tablet; Take 1 tablet by mouth Daily. 5 po once a day for 2 days, 4 po once a day for 2 days, 3 po once a day for 2 days, 2 po once a day for 2 days, 1 po once  a day for 2 days  Dispense: 30 tablet; Refill: 0  -     ECG 12 Lead             Follow Up:   No follow-ups on file.    Betito Bowen MD  [facility]

## 2024-01-10 RX ORDER — PRAVASTATIN SODIUM 40 MG
40 TABLET ORAL DAILY
Qty: 90 TABLET | Refills: 0 | Status: SHIPPED | OUTPATIENT
Start: 2024-01-10

## 2024-02-20 ENCOUNTER — READMISSION MANAGEMENT (OUTPATIENT)
Dept: CALL CENTER | Facility: HOSPITAL | Age: 69
End: 2024-02-20
Payer: MEDICARE

## 2024-02-20 NOTE — OUTREACH NOTE
Prep Survey      Flowsheet Row Responses   [facility] discharged [facility]? Non-[facility]   Is LACE score < 7 ? Non-BH Discharge   Eligibility [facility]   Date of Discharge 02/20/24   Discharge Disposition Home-Health Care [facility]   Discharge diagnosis Covid-19   Does the patient have one of the following disease processes/diagnoses(primary or secondary)? Other   Prep survey completed? Yes            Georgette HERNANDEZ - Registered Nurse

## 2024-02-21 ENCOUNTER — TRANSITIONAL CARE MANAGEMENT TELEPHONE ENCOUNTER (OUTPATIENT)
Dept: CALL CENTER | Facility: HOSPITAL | Age: 69
End: 2024-02-21
Payer: MEDICARE

## 2024-02-21 NOTE — OUTREACH NOTE
Call Center TCM Note      Flowsheet Row Responses   [facility] patient discharged from? Non-  [[facility]]   Does the patient have one of the following disease processes/diagnoses(primary or secondary)? Other   TCM attempt successful? Yes   Call start time 1523   Call end time 1528   Discharge diagnosis Covid-19   Meds reviewed with patient/caregiver? Yes   Is the patient having any side effects they believe may be caused by any medication additions or changes? No   Does the patient have all medications ordered at discharge? Yes   Is the patient taking all medications as directed (includes completed medication regime)? Yes   Medication comments Will get meds today   Comments MYCHART VIDEO VISIT 2/27/24 315 pm   Does the patient have an appointment with their PCP within 7-14 days of discharge? Yes   Has home health visited the patient within 72 hours of discharge? Call prior to 72 hours   Psychosocial issues? No   Did the patient receive a copy of their discharge instructions? Yes   Nursing interventions Reviewed instructions with patient   What is the patient's perception of their health status since discharge? Improving   Is the patient/caregiver able to teach back signs and symptoms related to disease process for when to call PCP? Yes   Is the patient/caregiver able to teach back signs and symptoms related to disease process for when to call 911? Yes   Is the patient/caregiver able to teach back the hierarchy of who to call/visit for symptoms/problems? PCP, Specialist, Home health nurse, Urgent Care, ED, 911 Yes   TCM call completed? Yes   Wrap up additional comments Pt reports he will be getting his meds today. Pt aware of [employer] visit with PCP and states his daughter will be there to assist him.   Call end time 1528            Joyce Hagen RN    2/21/2024, 15:28 EST

## 2024-02-21 NOTE — OUTREACH NOTE
Call Center TCM Note      Flowsheet Row Responses   [facility] [place] patient discharged from? Non-  [facility] [place] [facility]   Does the patient have one of the following disease processes/diagnoses(primary or secondary)? Other   TCM attempt successful? No  [No one on VR]   Unsuccessful attempts Attempt 1            Joyce Hagen RN    2/21/2024, 10:19 EST

## 2024-02-27 ENCOUNTER — TELEMEDICINE (OUTPATIENT)
Dept: FAMILY MEDICINE CLINIC | Facility: CLINIC | Age: 69
End: 2024-02-27
Payer: MEDICARE

## 2024-02-27 DIAGNOSIS — J44.9 END STAGE COPD: Primary | ICD-10-CM

## 2024-02-27 DIAGNOSIS — I10 PRIMARY HYPERTENSION: ICD-10-CM

## 2024-02-27 PROCEDURE — 99214 OFFICE O/P EST MOD 30 MIN: CPT | Performed by: FAMILY MEDICINE

## 2024-02-27 RX ORDER — LOSARTAN POTASSIUM 100 MG/1
100 TABLET ORAL DAILY
Qty: 90 TABLET | Refills: 1 | Status: SHIPPED | OUTPATIENT
Start: 2024-02-27

## 2024-02-27 RX ORDER — BUDESONIDE 1 MG/2ML
1 INHALANT ORAL
COMMUNITY

## 2024-02-27 RX ORDER — PRAVASTATIN SODIUM 40 MG
40 TABLET ORAL DAILY
Qty: 90 TABLET | Refills: 0 | Status: SHIPPED | OUTPATIENT
Start: 2024-02-27

## 2024-02-27 RX ORDER — AZITHROMYCIN 250 MG/1
250 TABLET, FILM COATED ORAL EVERY OTHER DAY
Qty: 15 TABLET | Refills: 5 | Status: SHIPPED | OUTPATIENT
Start: 2024-02-27

## 2024-02-27 RX ORDER — DILTIAZEM HYDROCHLORIDE 240 MG/1
240 CAPSULE, COATED, EXTENDED RELEASE ORAL DAILY
Qty: 90 CAPSULE | Refills: 1 | Status: SHIPPED | OUTPATIENT
Start: 2024-02-27

## 2024-02-27 RX ORDER — BUSPIRONE HYDROCHLORIDE 15 MG/1
15 TABLET ORAL 3 TIMES DAILY
COMMUNITY
Start: 2024-02-20 | End: 2024-02-27

## 2024-02-27 RX ORDER — BUDESONIDE AND FORMOTEROL FUMARATE DIHYDRATE 160; 4.5 UG/1; UG/1
2 AEROSOL RESPIRATORY (INHALATION)
Qty: 3 EACH | Refills: 5 | Status: SHIPPED | OUTPATIENT
Start: 2024-02-27

## 2024-02-27 RX ORDER — ALBUTEROL SULFATE 90 UG/1
2 AEROSOL, METERED RESPIRATORY (INHALATION) EVERY 4 HOURS PRN
Qty: 8 G | Refills: 10 | Status: SHIPPED | OUTPATIENT
Start: 2024-02-27

## 2024-02-27 RX ORDER — FUROSEMIDE 40 MG/1
40 TABLET ORAL DAILY
Qty: 90 TABLET | Refills: 1 | Status: SHIPPED | OUTPATIENT
Start: 2024-02-27

## 2024-02-27 RX ORDER — GLIPIZIDE 5 MG/1
5 TABLET ORAL
Qty: 180 TABLET | Refills: 1 | Status: SHIPPED | OUTPATIENT
Start: 2024-02-27

## 2024-02-27 NOTE — PROGRESS NOTES
Telehealth E-Visit      Patient Name: Iker Barber  : 1955   MRN: [id number]     Chief Complaint:    Chief Complaint   Patient presents with    Hospital Follow Up Visit     Admitted to [facility] on 24 for SOB. Patient was discharged home on 2024 . All medications reconciled.       Pt wants to know if he needs more antibiotic?         I have reviewed the E-Visit questionnaire and the patient's answers, my assessment and plan are listed below.   You have chosen to receive care through a telehealth visit.  Do you consent to use a video/audio connection for your medical care today? Yes     History of Present Illness: Iker Barber is a 68 y.o. male who is here today to follow up with COPD      Subjective      Review of Systems:   Review of Systems   Constitutional:  Positive for fatigue.   HENT: Negative.     Eyes: Negative.    Respiratory:  Positive for cough and shortness of breath.    Cardiovascular: Negative.    Gastrointestinal: Negative.    Neurological: Negative.        I have reviewed and the following portions of the patient's history were updated as appropriate: past family history, past medical history, past social history, past surgical history and problem list.    Medications:     Current Outpatient Medications:     Accu-Chek Softclix Lancets lancets, by Other route. Use as instructed, Disp: , Rfl:     albuterol (PROVENTIL) (2.5 MG/3ML) 0.083% nebulizer solution, Take 2.5 mg by nebulization Every 4 (Four) Hours As Needed for Shortness of Air., Disp: 540 each, Rfl: 1    albuterol sulfate  (90 Base) MCG/ACT inhaler, Inhale 2 puffs Every 4 (Four) Hours As Needed for Wheezing., Disp: 8 g, Rfl: 10    azithromycin (ZITHROMAX) 250 MG tablet, Take 1 tablet by mouth Every Other Day. Indications: COPD Exacerbation Suppression, Disp: 15 tablet, Rfl: 5    budesonide (PULMICORT) 1 MG/2ML nebulizer solution, Take 2 mL by nebulization Daily., Disp: , Rfl:     clobetasol (TEMOVATE) 0.05 %  external solution, Apply  topically to the appropriate area as directed 2 (Two) Times a Day., Disp: 50 mL, Rfl: 3    dilTIAZem CD (CARDIZEM CD) 240 MG 24 hr capsule, Take 1 capsule by mouth Daily., Disp: 90 capsule, Rfl: 1    fluconazole (DIFLUCAN) 150 MG tablet, Take 1 tablet by mouth Daily., Disp: 10 tablet, Rfl: 5    furosemide (LASIX) 40 MG tablet, Take 1 tablet by mouth Daily., Disp: 90 tablet, Rfl: 1    glipizide (Glucotrol) 5 MG tablet, Take 1 tablet by mouth 2 (Two) Times a Day Before Meals., Disp: 180 tablet, Rfl: 1    glucose blood (Accu-Chek Guide) test strip, 1 each by Other route Daily. Use as instructed, Disp: 100 each, Rfl: 2    losartan (COZAAR) 100 MG tablet, Take 1 tablet by mouth Daily., Disp: 90 tablet, Rfl: 1    nitroglycerin (NITROSTAT) 0.4 MG SL tablet, Place 1 tablet under the tongue Every 5 (Five) Minutes As Needed for Chest Pain., Disp: 30 tablet, Rfl: 5    nystatin (MYCOSTATIN) 100,000 unit/mL suspension, Swish and swallow 5 mL 4 (Four) Times a Day., Disp: 473 mL, Rfl: 5    pravastatin (PRAVACHOL) 40 MG tablet, Take 1 tablet by mouth Daily., Disp: 90 tablet, Rfl: 0    Symbicort 160-4.5 MCG/ACT inhaler, Inhale 2 puffs 2 (Two) Times a Day., Disp: 3 each, Rfl: 5    predniSONE (DELTASONE) 10 MG tablet, Take 1 tablet by mouth Daily. 5 po once a day for 2 days, 4 po once a day for 2 days, 3 po once a day for 2 days, 2 po once a day for 2 days, 1 po once a day for 2 days, Disp: 30 tablet, Rfl: 0    Allergies:   No Known Allergies    Objective     Physical Exam:  Vital Signs: There were no vitals filed for this visit.  There is no height or weight on file to calculate BMI.    Physical Exam  Vitals and nursing note reviewed.   Constitutional:       Appearance: Normal appearance. He is normal weight.   HENT:      Head: Normocephalic and atraumatic.      Right Ear: External ear normal.      Left Ear: External ear normal.      Nose: Nose normal.   Eyes:      Extraocular Movements: Extraocular  movements intact.      Conjunctiva/sclera: Conjunctivae normal.   Pulmonary:      Effort: Pulmonary effort is normal.   Musculoskeletal:      Cervical back: Normal range of motion.   Feet:      Comments:      Neurological:      General: No focal deficit present.      Mental Status: He is alert and oriented to person, place, and time. Mental status is at baseline.   Psychiatric:         Mood and Affect: Mood normal.         Behavior: Behavior normal.         Thought Content: Thought content normal.         Judgment: Judgment normal.         Assessment / Plan      Assessment/Plan:   Diagnoses and all orders for this visit:    1. End stage COPD (Primary)  Assessment & Plan:  Patient has end-stage COPD.  Discussed with him at appetite stimulant.  He is on come and see me March 12.  Continues nebulizers 4 times a day when he feels tight or if he is has a productive cough.      2. Primary hypertension  Assessment & Plan:  Discussed with patient to monitor their blood pressure and if systolic blood pressure goes above 140 or diastolic is above 90 to return to clinic.  Take medicines as directed, call for any problems, patient not having or any worrisome symptoms.        Orders:  -     losartan (COZAAR) 100 MG tablet; Take 1 tablet by mouth Daily.  Dispense: 90 tablet; Refill: 1    Other orders  -     glipizide (Glucotrol) 5 MG tablet; Take 1 tablet by mouth 2 (Two) Times a Day Before Meals.  Dispense: 180 tablet; Refill: 1  -     furosemide (LASIX) 40 MG tablet; Take 1 tablet by mouth Daily.  Dispense: 90 tablet; Refill: 1  -     dilTIAZem CD (CARDIZEM CD) 240 MG 24 hr capsule; Take 1 capsule by mouth Daily.  Dispense: 90 capsule; Refill: 1  -     azithromycin (ZITHROMAX) 250 MG tablet; Take 1 tablet by mouth Every Other Day. Indications: COPD Exacerbation Suppression  Dispense: 15 tablet; Refill: 5  -     pravastatin (PRAVACHOL) 40 MG tablet; Take 1 tablet by mouth Daily.  Dispense: 90 tablet; Refill: 0  -     albuterol  sulfate  (90 Base) MCG/ACT inhaler; Inhale 2 puffs Every 4 (Four) Hours As Needed for Wheezing.  Dispense: 8 g; Refill: 10  -     Symbicort 160-4.5 MCG/ACT inhaler; Inhale 2 puffs 2 (Two) Times a Day.  Dispense: 3 each; Refill: 5         Follow Up:   No follow-ups on file.    Any medications prescribed have been sent electronically to   [id number] - [place], [place] - [address] AT [facility] [address] - [phone]  - [phone] FX  [address] [facility] [address]  Phone: [phone] Fax: [phone]        Betito Bowen MD  [facility]   02/27/24  15:40 EST

## 2024-02-27 NOTE — ASSESSMENT & PLAN NOTE
Patient has end-stage COPD.  Discussed with him at appetite stimulant.  He is on come and see me March 12.  Continues nebulizers 4 times a day when he feels tight or if he is has a productive cough.

## 2024-02-28 ENCOUNTER — TELEPHONE (OUTPATIENT)
Dept: FAMILY MEDICINE CLINIC | Facility: CLINIC | Age: 69
End: 2024-02-28
Payer: MEDICARE

## 2024-02-28 NOTE — TELEPHONE ENCOUNTER
Name: CHAD      Relationship: HOME HEALTH      Best Callback Number: [phone]       HUB PROVIDED THE RELAY MESSAGE FROM THE OFFICE      PATIENT: VOICED UNDERSTANDING AND HAS NO FURTHER QUESTIONS AT THIS TIME    ADDITIONAL INFORMATION: THANK YOU.

## 2024-02-28 NOTE — TELEPHONE ENCOUNTER
KEYUR CALLED REQUESTING THE HOME HEALTH ORDERS FOR THIS PT, SHE SATED SHE REACHED OUT A FEW DAYS AGO FOR THEM AND WE TOLD HER HE NEEDED AN APPT. HE HAD AN APPT YESTERDAY WITH DR PRICE VIA TELEHEALTH AND SHE IS NEEDING THOSE HOME HEALTH ORDERS. SHE WOULD LIKE A CALL BACK FROM ABBY   [phone].

## 2024-03-15 ENCOUNTER — OFFICE VISIT (OUTPATIENT)
Dept: FAMILY MEDICINE CLINIC | Facility: CLINIC | Age: 69
End: 2024-03-15
Payer: MEDICARE

## 2024-03-15 VITALS
WEIGHT: 138.9 LBS | HEART RATE: 106 BPM | HEIGHT: 69 IN | SYSTOLIC BLOOD PRESSURE: 96 MMHG | BODY MASS INDEX: 20.57 KG/M2 | DIASTOLIC BLOOD PRESSURE: 56 MMHG | OXYGEN SATURATION: 96 % | RESPIRATION RATE: 20 BRPM

## 2024-03-15 DIAGNOSIS — J44.9 END STAGE COPD: ICD-10-CM

## 2024-03-15 DIAGNOSIS — I50.23 ACUTE ON CHRONIC SYSTOLIC CONGESTIVE HEART FAILURE: Primary | ICD-10-CM

## 2024-03-15 DIAGNOSIS — E11.65 TYPE 2 DIABETES MELLITUS WITH HYPERGLYCEMIA, WITHOUT LONG-TERM CURRENT USE OF INSULIN: ICD-10-CM

## 2024-03-15 DIAGNOSIS — E78.2 HYPERLIPIDEMIA, MIXED: ICD-10-CM

## 2024-03-15 DIAGNOSIS — I10 PRIMARY HYPERTENSION: ICD-10-CM

## 2024-03-15 NOTE — PROGRESS NOTES
Patient Name: Iker Barber  : 1955   MRN: [id number]     Chief Complaint:    Chief Complaint   Patient presents with    Congestive Heart Failure     Going on about 2 weeks         History of Present Illness: Iker Barber is a 69 y.o. male who is here today for follow up.  Congestive Heart Failure  Associated symptoms include fatigue and shortness of breath.           Review of Systems:   Review of Systems   Constitutional:  Positive for fatigue.   HENT: Negative.     Eyes: Negative.    Respiratory:  Positive for shortness of breath.    Cardiovascular: Negative.    Gastrointestinal: Negative.    Neurological: Negative.         Past Medical History:   Past Medical History:   Diagnosis Date    Anxiety     Arthritis     Asthma     Diabetes     Elevated blood pressure reading     Emphysema, unspecified     Esophageal reflux     Hearing problem     Heart disease     Hypercholesterolemia     Irritable bowel syndrome (IBS)     Peripheral artery disease     Skin disorder     Stroke (cerebrum)        Past Surgical History: No past surgical history on file.    Family History:   Family History   Problem Relation Age of Onset    Allergies Mother     Alzheimer's disease Mother     Diabetes Mother     Diabetes Father     Diabetes Brother        Social History:   Social History     Socioeconomic History    Marital status:    Tobacco Use    Smoking status: Former     Current packs/day: 0.00     Average packs/day: 1 pack/day for 40.0 years (40.0 ttl pk-yrs)     Types: Cigarettes     Start date:      Quit date: 2013     Years since quittin.2     Passive exposure: Past    Smokeless tobacco: Never   Vaping Use    Vaping status: Never Used   Substance and Sexual Activity    Alcohol use: Not Currently    Drug use: Never    Sexual activity: Not Currently     Partners: Female       Medications:     Current Outpatient Medications:     Accu-Chek Softclix Lancets lancets, by Other route. Use as  instructed, Disp: , Rfl:     albuterol (PROVENTIL) (2.5 MG/3ML) 0.083% nebulizer solution, Take 2.5 mg by nebulization Every 4 (Four) Hours As Needed for Shortness of Air., Disp: 540 each, Rfl: 1    albuterol sulfate  (90 Base) MCG/ACT inhaler, Inhale 2 puffs Every 4 (Four) Hours As Needed for Wheezing., Disp: 8 g, Rfl: 10    azithromycin (ZITHROMAX) 250 MG tablet, Take 1 tablet by mouth Every Other Day. Indications: COPD Exacerbation Suppression, Disp: 15 tablet, Rfl: 5    dilTIAZem CD (CARDIZEM CD) 240 MG 24 hr capsule, Take 1 capsule by mouth Daily., Disp: 90 capsule, Rfl: 1    fluconazole (DIFLUCAN) 150 MG tablet, Take 1 tablet by mouth Daily., Disp: 10 tablet, Rfl: 5    furosemide (LASIX) 40 MG tablet, Take 1 tablet by mouth Daily., Disp: 90 tablet, Rfl: 1    glipizide (Glucotrol) 5 MG tablet, Take 1 tablet by mouth 2 (Two) Times a Day Before Meals., Disp: 180 tablet, Rfl: 1    glucose blood (Accu-Chek Guide) test strip, 1 each by Other route Daily. Use as instructed, Disp: 100 each, Rfl: 2    losartan (COZAAR) 100 MG tablet, Take 1 tablet by mouth Daily., Disp: 90 tablet, Rfl: 1    nitroglycerin (NITROSTAT) 0.4 MG SL tablet, Place 1 tablet under the tongue Every 5 (Five) Minutes As Needed for Chest Pain., Disp: 30 tablet, Rfl: 5    nystatin (MYCOSTATIN) 100,000 unit/mL suspension, Swish and swallow 5 mL 4 (Four) Times a Day., Disp: 473 mL, Rfl: 5    pravastatin (PRAVACHOL) 40 MG tablet, Take 1 tablet by mouth Daily., Disp: 90 tablet, Rfl: 0    Symbicort 160-4.5 MCG/ACT inhaler, Inhale 2 puffs 2 (Two) Times a Day., Disp: 3 each, Rfl: 5    budesonide (PULMICORT) 1 MG/2ML nebulizer solution, Take 2 mL by nebulization Daily. (Patient not taking: Reported on 3/15/2024), Disp: , Rfl:     clobetasol (TEMOVATE) 0.05 % external solution, Apply  topically to the appropriate area as directed 2 (Two) Times a Day. (Patient not taking: Reported on 3/15/2024), Disp: 50 mL, Rfl: 3    predniSONE (DELTASONE) 10 MG  "tablet, Take 1 tablet by mouth Daily. 5 po once a day for 2 days, 4 po once a day for 2 days, 3 po once a day for 2 days, 2 po once a day for 2 days, 1 po once a day for 2 days (Patient not taking: Reported on 3/15/2024), Disp: 30 tablet, Rfl: 0    Allergies:   No Known Allergies      Physical Exam:  Vital Signs:   Vitals:    03/15/24 1307   BP: 96/56   BP Location: Left arm   Patient Position: Sitting   Cuff Size: Adult   Pulse: 106   Resp: 20   SpO2: 96%   Weight: 63 kg (138 lb 14.4 oz)   Height: 175.3 cm (69.02\")     Body mass index is 20.5 kg/m².     Physical Exam  Vitals and nursing note reviewed.   Constitutional:       Appearance: Normal appearance. He is normal weight. He is ill-appearing.   HENT:      Head: Normocephalic and atraumatic.      Right Ear: Tympanic membrane, ear canal and external ear normal.      Left Ear: Tympanic membrane, ear canal and external ear normal.      Nose: Nose normal.      Mouth/Throat:      Mouth: Mucous membranes are dry.      Pharynx: Oropharynx is clear.   Eyes:      Extraocular Movements: Extraocular movements intact.      Conjunctiva/sclera: Conjunctivae normal.      Pupils: Pupils are equal, round, and reactive to light.   Cardiovascular:      Rate and Rhythm: Normal rate and regular rhythm.      Pulses: Normal pulses.      Heart sounds: Normal heart sounds.   Pulmonary:      Effort: Pulmonary effort is normal.      Breath sounds: Normal breath sounds.   Musculoskeletal:      Cervical back: Normal range of motion and neck supple.   Feet:      Comments:      Neurological:      Mental Status: He is alert.           ECG 12 Lead    Date/Time: 3/15/2024 2:08 PM  Performed by: Betito Bowen MD    Authorized by: Betito Bowen MD  Comparison: compared with previous ECG from 12/19/2023  Comparison to previous ECG: No sig change  Rhythm: sinus tachycardia  Rate: tachycardic  Conduction: conduction normal  ST Segments: ST segments normal  T Waves: T waves normal  QRS axis: " normal    Clinical impression: non-specific ECG  Comments: LAE, tachycardia,             Assessment/Plan:   Diagnoses and all orders for this visit:    1. Acute on chronic systolic congestive heart failure (Primary)  -     Comprehensive Metabolic Panel; Future  -     Hemoglobin A1c; Future  -     Lipid Panel; Future  -     CBC & Differential; Future  -     Vitamin B12; Future  -     TSH Rfx On Abnormal To Free T4; Future  -     Adult Transthoracic Echo Complete W/ Cont if Necessary Per Protocol; Future  -     Ambulatory Referral to Cardiology  -     ECG 12 Lead    2. End stage COPD  Assessment & Plan:  Patient is chronically oxygen dependent.  Will follow.    Orders:  -     Comprehensive Metabolic Panel; Future  -     Hemoglobin A1c; Future  -     Lipid Panel; Future  -     CBC & Differential; Future  -     Vitamin B12; Future  -     TSH Rfx On Abnormal To Free T4; Future    3. Type 2 diabetes mellitus with hyperglycemia, without long-term current use of insulin  Assessment & Plan:  Blood work    Orders:  -     Comprehensive Metabolic Panel; Future  -     Hemoglobin A1c; Future  -     Lipid Panel; Future  -     CBC & Differential; Future  -     Vitamin B12; Future  -     TSH Rfx On Abnormal To Free T4; Future    4. Primary hypertension  Assessment & Plan:  Discussed with patient to monitor their blood pressure and if systolic blood pressure goes above 140 or diastolic is above 90 to return to clinic.  Take medicines as directed, call for any problems, patient not having or any worrisome symptoms.        Orders:  -     Comprehensive Metabolic Panel; Future  -     Hemoglobin A1c; Future  -     Lipid Panel; Future  -     CBC & Differential; Future  -     Vitamin B12; Future  -     TSH Rfx On Abnormal To Free T4; Future    5. Hyperlipidemia, mixed  -     Comprehensive Metabolic Panel; Future  -     Hemoglobin A1c; Future  -     Lipid Panel; Future  -     CBC & Differential; Future  -     Vitamin B12; Future  -     TSH  Rfx On Abnormal To Free T4; Future             Follow Up:   Return in about 1 month (around 4/15/2024) for Annual physical, Bloodwork 1 week prior to next appointment.      Betito Bowen MD  [facility]

## 2024-03-18 ENCOUNTER — TELEPHONE (OUTPATIENT)
Dept: FAMILY MEDICINE CLINIC | Facility: CLINIC | Age: 69
End: 2024-03-18
Payer: MEDICARE

## 2024-03-18 ENCOUNTER — TELEPHONE (OUTPATIENT)
Dept: CARDIOLOGY | Facility: CLINIC | Age: 69
End: 2024-03-18
Payer: MEDICARE

## 2024-03-18 DIAGNOSIS — J44.9 END STAGE COPD: ICD-10-CM

## 2024-03-18 RX ORDER — ALBUTEROL SULFATE 2.5 MG/3ML
2.5 SOLUTION RESPIRATORY (INHALATION) EVERY 4 HOURS PRN
Qty: 540 EACH | Refills: 1 | Status: SHIPPED | OUTPATIENT
Start: 2024-03-18

## 2024-04-08 ENCOUNTER — OFFICE VISIT (OUTPATIENT)
Dept: CARDIOLOGY | Facility: CLINIC | Age: 69
End: 2024-04-08
Payer: MEDICARE

## 2024-04-08 VITALS
HEART RATE: 66 BPM | HEIGHT: 69 IN | OXYGEN SATURATION: 99 % | BODY MASS INDEX: 21.33 KG/M2 | DIASTOLIC BLOOD PRESSURE: 64 MMHG | RESPIRATION RATE: 18 BRPM | SYSTOLIC BLOOD PRESSURE: 110 MMHG | WEIGHT: 144 LBS

## 2024-04-08 DIAGNOSIS — I25.118 CORONARY ARTERY DISEASE OF NATIVE ARTERY OF NATIVE HEART WITH STABLE ANGINA PECTORIS: ICD-10-CM

## 2024-04-08 DIAGNOSIS — E78.2 HYPERLIPIDEMIA, MIXED: ICD-10-CM

## 2024-04-08 DIAGNOSIS — J44.9 END STAGE COPD: ICD-10-CM

## 2024-04-08 DIAGNOSIS — R06.02 SHORTNESS OF BREATH: ICD-10-CM

## 2024-04-08 DIAGNOSIS — R60.0 BILATERAL LEG EDEMA: Primary | ICD-10-CM

## 2024-04-08 PROBLEM — I50.23 ACUTE ON CHRONIC SYSTOLIC CONGESTIVE HEART FAILURE: Status: RESOLVED | Noted: 2024-03-15 | Resolved: 2024-04-08

## 2024-04-08 PROCEDURE — 1159F MED LIST DOCD IN RCRD: CPT | Performed by: INTERNAL MEDICINE

## 2024-04-08 PROCEDURE — 3074F SYST BP LT 130 MM HG: CPT | Performed by: INTERNAL MEDICINE

## 2024-04-08 PROCEDURE — 99204 OFFICE O/P NEW MOD 45 MIN: CPT | Performed by: INTERNAL MEDICINE

## 2024-04-08 PROCEDURE — 3078F DIAST BP <80 MM HG: CPT | Performed by: INTERNAL MEDICINE

## 2024-04-08 PROCEDURE — 93000 ELECTROCARDIOGRAM COMPLETE: CPT | Performed by: INTERNAL MEDICINE

## 2024-04-08 PROCEDURE — 1160F RVW MEDS BY RX/DR IN RCRD: CPT | Performed by: INTERNAL MEDICINE

## 2024-04-08 RX ORDER — DILTIAZEM HYDROCHLORIDE 300 MG/1
300 CAPSULE, COATED, EXTENDED RELEASE ORAL DAILY
Qty: 90 CAPSULE | Refills: 2 | Status: SHIPPED | OUTPATIENT
Start: 2024-04-08

## 2024-04-08 RX ORDER — ISOSORBIDE MONONITRATE 30 MG/1
30 TABLET, EXTENDED RELEASE ORAL DAILY
Qty: 90 TABLET | Refills: 11 | Status: SHIPPED | OUTPATIENT
Start: 2024-04-08

## 2024-04-08 RX ORDER — PRAVASTATIN SODIUM 40 MG
40 TABLET ORAL DAILY
Qty: 90 TABLET | Refills: 0 | Status: SHIPPED | OUTPATIENT
Start: 2024-04-08

## 2024-04-08 RX ORDER — ASPIRIN 81 MG/1
81 TABLET ORAL DAILY
Qty: 90 TABLET | Refills: 3 | Status: SHIPPED | OUTPATIENT
Start: 2024-04-08

## 2024-04-08 NOTE — PROGRESS NOTES
[facility]  [facility] CARDIOLOGY  [address] [place] [address]  Dept: [phone]  Dept Fax: [phone]    Iker Barber  1955    New Patient Office Note    History of Present Illness:  Iker Barber is a 69 y.o. male who presents to the clinic for Establish Care. Shortness of breath and edema ,- male 69 years old with severe COPD on oxygen 8 liter, has had earlier in 2,2024 flu and Covid and he has been having more Sob and also edema, he is on lasix 40 mg daily   has been on it for long time but recently has been taking daily, , he has diabetes, , history of stroke and Hyperlipidemia, , he also complaints of chest pain, for the last 3 weeks that he called angina at resting, takes nitro sl  and gets better in few minutes, he has Severe SOB walking from one room to another  and feels very weak, , he states since he start taking the lasix daily, the edema is better, , , he has had a cardiac cath 10 years ago with no significant disease, , he has an echo with mild diastolic dysfunction and normal Ef with normal RVSP and right side normal, he has had Ct chest in 2022 and also 2024 with calcifications of the coronary arteries , his cardiac exam seems normal except 1-2 plus edema, his EKG sinus  with ,he states usually his Hr is fast and he is on Cardizem 240 mg, , we discuss about that very likely his worsening of edema and Sob are related to worsening of the COPD,. That is possible that he might have CAD and maybe severe but he is not a good candidate to have non invasive evaluation and if we really would  like to assess the coronary status, will need a cardiac cath, we discuss about the risk of the procedure which include, stroke, death, MI, , bleeding, etc. The patient understand the situation, will let us know ,his wide is with him and she understand .    The following portions of the patient's history were reviewed and updated as appropriate: allergies,  current medications, past family history, past medical history, past social history, past surgical history, and problem list.    Medications:  Accu-Chek Guide strip  Accu-Chek Softclix Lancets  albuterol  albuterol sulfate HFA  aspirin  azithromycin  clobetasol  dilTIAZem CD  fluconazole  furosemide  glipizide  isosorbide mononitrate  losartan  nitroglycerin  nystatin suspension  pravastatin  predniSONE  Symbicort aerosol    Subjective  No Known Allergies     Past Medical History:   Diagnosis Date    Anxiety     Arthritis     Asthma     Diabetes     Elevated blood pressure reading     Emphysema, unspecified     Esophageal reflux     Hearing problem     Heart disease     Hypercholesterolemia     Irritable bowel syndrome (IBS)     Peripheral artery disease     Skin disorder     Stroke (cerebrum)        History reviewed. No pertinent surgical history.    Family History   Problem Relation Age of Onset    Allergies Mother     Alzheimer's disease Mother     Diabetes Mother     Diabetes Father     Diabetes Brother         Social History     Socioeconomic History    Marital status:    Tobacco Use    Smoking status: Former     Current packs/day: 0.00     Average packs/day: 1 pack/day for 40.0 years (40.0 ttl pk-yrs)     Types: Cigarettes     Start date:      Quit date:      Years since quittin.2     Passive exposure: Past    Smokeless tobacco: Never   Vaping Use    Vaping status: Never Used   Substance and Sexual Activity    Alcohol use: Not Currently    Drug use: Never    Sexual activity: Not Currently     Partners: Female       Review of Systems   Constitutional:  Positive for fatigue.   HENT: Negative.     Respiratory:  Positive for shortness of breath.    Cardiovascular:  Positive for chest pain and leg swelling.   Endocrine: Negative.    Genitourinary: Negative.    Musculoskeletal: Negative.    Skin: Negative.    Allergic/Immunologic: Negative.    Neurological:  Positive for weakness.  "  Hematological: Negative.    Psychiatric/Behavioral: Negative.         Cardiovascular Procedures    ECHO/MUGA:  STRESS TESTS:   CARDIAC CATH:   DEVICES:   HOLTER:   CT/MRI:   VASCULAR:   CARDIOTHORACIC:     Objective  Vitals:    04/08/24 1124   BP: 110/64   BP Location: Right arm   Patient Position: Lying   Cuff Size: Adult   Pulse: 66   Resp: 18   SpO2: 99%   Weight: 65.3 kg (144 lb)   Height: 175.5 cm (69.08\")   PainSc: 0-No pain       Physical Exam  Vitals reviewed.   Constitutional:       Appearance: Healthy appearance. Not in distress.   Eyes:      Pupils: Pupils are equal, round, and reactive to light.   HENT:    Mouth/Throat:      Pharynx: Oropharynx is clear.   Neck:      Thyroid: Thyroid normal.      Vascular: No JVR. JVD normal.   Pulmonary:      Effort: Pulmonary effort is normal.      Breath sounds: Normal breath sounds. No wheezing. No rhonchi. No rales.   Chest:      Chest wall: Not tender to palpatation.   Cardiovascular:      PMI at left midclavicular line. Normal rate. Regular rhythm. Normal S1. Normal S2.       Murmurs: There is no murmur.      No gallop.  No click. No rub.   Pulses:     Intact distal pulses.      Carotid: 3+ bilaterally.     Radial: 3+ bilaterally.     Femoral: 3+ bilaterally.     Dorsalis pedis: 3+ bilaterally.     Posterior tibial: 3+ bilaterally.  Edema:     Thigh: bilateral 2+ edema of the thigh.     Pretibial: bilateral 2+ edema of the pretibial area.     Ankle: bilateral 2+ edema of the ankle.     Feet: bilateral 2+ edema of the feet.  Abdominal:      General: Bowel sounds are normal.      Palpations: Abdomen is soft.      Tenderness: There is no abdominal tenderness.   Musculoskeletal: Normal range of motion.         General: No tenderness.      Cervical back: Normal range of motion and neck supple. Skin:     General: Skin is warm and dry.   Neurological:      General: No focal deficit present.      Mental Status: Alert and oriented to person, place and time.      "     Diagnostic Data    ECG 12 Lead    Date/Time: 4/8/2024 8:12 PM  Performed by: Luis Carlos Starr MD    Authorized by: Luis Carlos Starr MD  Comparison: compared with previous ECG from 3/15/2024  Similar to previous ECG  Rhythm: sinus rhythm and sinus tachycardia  Rate: normal  BPM: 100  QRS axis: normal    Clinical impression: normal ECG          Assessment and Plan  Diagnoses and all orders for this visit:    Bilateral leg edema- 1-2 plus now, better on Lasix daily, likely due to COPD, inactivity and questionable diastolic dysfunction, will get a BNP and troponin  -     proBNP  -     Troponin T    Hyperlipidemia, - On Pravastatin 40 mg seems he has not been taking daily, his last LDL is 133, he likely need more agressive treatment with Crestor but he is not interested    Coronary artery disease of native artery of native heart with stable angina pectoris- by CT calcifications of the coronary arteries, has chest pain at resting, better with nitro, EKG normal  echo normal, we discuss about cardiac cath, will add Imdur 30 mg and also increase Cardizem  to 300 mg to slow down the Hr more,  -     proBNP  -     Troponin T    Shortness of breath- this is likely due to severe COPD  -     proBNP  -     Troponin T    End stage COPD- as above , he sees Dr Vega    Other orders  -     aspirin 81 MG EC tablet; Take 1 tablet by mouth Daily.  -     isosorbide mononitrate (IMDUR) 30 MG 24 hr tablet; Take 1 tablet by mouth Daily.  -     dilTIAZem CD (Cardizem CD) 300 MG 24 hr capsule; Take 1 capsule by mouth Daily.        Return in about 3 months (around 7/8/2024) for Recheck with Dr. Starr.    Luis Carlos Starr MD  04/08/2024

## 2024-04-09 ENCOUNTER — TELEPHONE (OUTPATIENT)
Dept: CARDIOLOGY | Facility: CLINIC | Age: 69
End: 2024-04-09
Payer: MEDICARE

## 2024-04-09 LAB
NT-PROBNP SERPL-MCNC: 92 PG/ML (ref 0–376)
TROPONIN T SERPL HS-MCNC: 39 NG/L (ref 0–22)

## 2024-04-09 NOTE — TELEPHONE ENCOUNTER
----- Message from Luis Carlos Starr MD sent at 4/9/2024  2:33 PM EDT -----  Troponin is mildly elevated

## 2024-04-09 NOTE — TELEPHONE ENCOUNTER
----- Message from Luis Carlos Starr MD sent at 4/9/2024 12:42 PM EDT -----  BNP is normal, suggesting is possible that the SOB and edema might not be cardiac related, no 100% but suggest

## 2024-04-10 ENCOUNTER — LAB (OUTPATIENT)
Dept: FAMILY MEDICINE CLINIC | Facility: CLINIC | Age: 69
End: 2024-04-10
Payer: MEDICARE

## 2024-04-10 DIAGNOSIS — I50.23 ACUTE ON CHRONIC SYSTOLIC CONGESTIVE HEART FAILURE: ICD-10-CM

## 2024-04-10 DIAGNOSIS — J44.9 END STAGE COPD: ICD-10-CM

## 2024-04-10 DIAGNOSIS — E78.2 HYPERLIPIDEMIA, MIXED: ICD-10-CM

## 2024-04-10 DIAGNOSIS — E11.65 TYPE 2 DIABETES MELLITUS WITH HYPERGLYCEMIA, WITHOUT LONG-TERM CURRENT USE OF INSULIN: ICD-10-CM

## 2024-04-10 DIAGNOSIS — I10 PRIMARY HYPERTENSION: ICD-10-CM

## 2024-04-11 LAB
ALBUMIN SERPL-MCNC: 3.8 G/DL (ref 3.9–4.9)
ALBUMIN/GLOB SERPL: 1.9 {RATIO} (ref 1.2–2.2)
ALP SERPL-CCNC: 71 IU/L (ref 44–121)
ALT SERPL-CCNC: 16 IU/L (ref 0–44)
AST SERPL-CCNC: 12 IU/L (ref 0–40)
BASOPHILS # BLD AUTO: 0 X10E3/UL (ref 0–0.2)
BASOPHILS NFR BLD AUTO: 0 %
BILIRUB SERPL-MCNC: <0.2 MG/DL (ref 0–1.2)
BUN SERPL-MCNC: 19 MG/DL (ref 8–27)
BUN/CREAT SERPL: 24 (ref 10–24)
CALCIUM SERPL-MCNC: 9 MG/DL (ref 8.6–10.2)
CHLORIDE SERPL-SCNC: 96 MMOL/L (ref 96–106)
CHOLEST SERPL-MCNC: 143 MG/DL (ref 100–199)
CO2 SERPL-SCNC: 33 MMOL/L (ref 20–29)
CREAT SERPL-MCNC: 0.78 MG/DL (ref 0.76–1.27)
EGFRCR SERPLBLD CKD-EPI 2021: 97 ML/MIN/1.73
EOSINOPHIL # BLD AUTO: 0.2 X10E3/UL (ref 0–0.4)
EOSINOPHIL NFR BLD AUTO: 3 %
ERYTHROCYTE [DISTWIDTH] IN BLOOD BY AUTOMATED COUNT: 11.8 % (ref 11.6–15.4)
GLOBULIN SER CALC-MCNC: 2 G/DL (ref 1.5–4.5)
GLUCOSE SERPL-MCNC: 375 MG/DL (ref 70–99)
HBA1C MFR BLD: 9.2 % (ref 4.8–5.6)
HCT VFR BLD AUTO: 34.2 % (ref 37.5–51)
HDLC SERPL-MCNC: 49 MG/DL
HGB BLD-MCNC: 11.4 G/DL (ref 13–17.7)
IMM GRANULOCYTES # BLD AUTO: 0 X10E3/UL (ref 0–0.1)
IMM GRANULOCYTES NFR BLD AUTO: 0 %
LDLC SERPL CALC-MCNC: 56 MG/DL (ref 0–99)
LYMPHOCYTES # BLD AUTO: 2 X10E3/UL (ref 0.7–3.1)
LYMPHOCYTES NFR BLD AUTO: 22 %
MCH RBC QN AUTO: 33.1 PG (ref 26.6–33)
MCHC RBC AUTO-ENTMCNC: 33.3 G/DL (ref 31.5–35.7)
MCV RBC AUTO: 99 FL (ref 79–97)
MONOCYTES # BLD AUTO: 0.7 X10E3/UL (ref 0.1–0.9)
MONOCYTES NFR BLD AUTO: 8 %
NEUTROPHILS # BLD AUTO: 6.1 X10E3/UL (ref 1.4–7)
NEUTROPHILS NFR BLD AUTO: 67 %
PLATELET # BLD AUTO: 232 X10E3/UL (ref 150–450)
POTASSIUM SERPL-SCNC: 4.3 MMOL/L (ref 3.5–5.2)
PROT SERPL-MCNC: 5.8 G/DL (ref 6–8.5)
RBC # BLD AUTO: 3.44 X10E6/UL (ref 4.14–5.8)
SODIUM SERPL-SCNC: 142 MMOL/L (ref 134–144)
TRIGL SERPL-MCNC: 243 MG/DL (ref 0–149)
TSH SERPL DL<=0.005 MIU/L-ACNC: 0.83 UIU/ML (ref 0.45–4.5)
VIT B12 SERPL-MCNC: 286 PG/ML (ref 232–1245)
VLDLC SERPL CALC-MCNC: 38 MG/DL (ref 5–40)
WBC # BLD AUTO: 9.1 X10E3/UL (ref 3.4–10.8)

## 2024-04-22 ENCOUNTER — TELEPHONE (OUTPATIENT)
Dept: FAMILY MEDICINE CLINIC | Facility: CLINIC | Age: 69
End: 2024-04-22
Payer: MEDICARE

## 2024-04-24 ENCOUNTER — OFFICE VISIT (OUTPATIENT)
Dept: FAMILY MEDICINE CLINIC | Facility: CLINIC | Age: 69
End: 2024-04-24
Payer: MEDICARE

## 2024-04-24 VITALS
BODY MASS INDEX: 20.44 KG/M2 | OXYGEN SATURATION: 99 % | SYSTOLIC BLOOD PRESSURE: 136 MMHG | DIASTOLIC BLOOD PRESSURE: 78 MMHG | WEIGHT: 146 LBS | RESPIRATION RATE: 15 BRPM | HEART RATE: 74 BPM | HEIGHT: 71 IN

## 2024-04-24 DIAGNOSIS — I25.118 CORONARY ARTERY DISEASE OF NATIVE ARTERY OF NATIVE HEART WITH STABLE ANGINA PECTORIS: ICD-10-CM

## 2024-04-24 DIAGNOSIS — E11.65 TYPE 2 DIABETES MELLITUS WITH HYPERGLYCEMIA, WITHOUT LONG-TERM CURRENT USE OF INSULIN: ICD-10-CM

## 2024-04-24 DIAGNOSIS — J44.9 END STAGE COPD: Primary | ICD-10-CM

## 2024-04-24 DIAGNOSIS — N28.9 KIDNEY LESION, NATIVE, RIGHT: ICD-10-CM

## 2024-04-24 PROCEDURE — 3075F SYST BP GE 130 - 139MM HG: CPT | Performed by: FAMILY MEDICINE

## 2024-04-24 PROCEDURE — 3078F DIAST BP <80 MM HG: CPT | Performed by: FAMILY MEDICINE

## 2024-04-24 PROCEDURE — 3046F HEMOGLOBIN A1C LEVEL >9.0%: CPT | Performed by: FAMILY MEDICINE

## 2024-04-24 PROCEDURE — 99214 OFFICE O/P EST MOD 30 MIN: CPT | Performed by: FAMILY MEDICINE

## 2024-04-24 PROCEDURE — G2211 COMPLEX E/M VISIT ADD ON: HCPCS | Performed by: FAMILY MEDICINE

## 2024-04-24 NOTE — ASSESSMENT & PLAN NOTE
Patient sees Dr. Monroe.  Patient does not want to see him anymore.  I told will be best if he is stated in the loop.  If he does not want to do the test he does not have to do them.  I still want him to see him though recheck in 1 month.

## 2024-04-24 NOTE — PROGRESS NOTES
Patient Name: Iker Barber  : 1955   MRN: [id number]     Chief Complaint:    Chief Complaint   Patient presents with    Follow-up     Patient is wanting to go over bw results an medications        History of Present Illness: Iker Barber is a 69 y.o. male who is here today for follow up on breathing and diabetes  HPI        Review of Systems:   Review of Systems   Constitutional: Negative.    HENT: Negative.     Eyes: Negative.    Respiratory: Negative.     Cardiovascular: Negative.    Gastrointestinal: Negative.    Neurological: Negative.         Past Medical History:   Past Medical History:   Diagnosis Date    Anxiety     Arthritis     Asthma     Diabetes     Elevated blood pressure reading     Emphysema, unspecified     Esophageal reflux     Hearing problem     Heart disease     Hypercholesterolemia     Irritable bowel syndrome (IBS)     Peripheral artery disease     Skin disorder     Stroke (cerebrum)        Past Surgical History: History reviewed. No pertinent surgical history.    Family History:   Family History   Problem Relation Age of Onset    Allergies Mother     Alzheimer's disease Mother     Diabetes Mother     Diabetes Father     Diabetes Brother        Social History:   Social History     Socioeconomic History    Marital status:    Tobacco Use    Smoking status: Former     Current packs/day: 0.00     Average packs/day: 1 pack/day for 40.0 years (40.0 ttl pk-yrs)     Types: Cigarettes     Start date:      Quit date:      Years since quittin.3     Passive exposure: Past    Smokeless tobacco: Never   Vaping Use    Vaping status: Never Used   Substance and Sexual Activity    Alcohol use: Not Currently    Drug use: Never    Sexual activity: Not Currently     Partners: Female       Medications:     Current Outpatient Medications:     Accu-Chek Softclix Lancets lancets, by Other route. Use as instructed, Disp: , Rfl:     albuterol (PROVENTIL) (2.5 MG/3ML) 0.083%  nebulizer solution, Take 2.5 mg by nebulization Every 4 (Four) Hours As Needed for Shortness of Air., Disp: 540 each, Rfl: 1    albuterol sulfate  (90 Base) MCG/ACT inhaler, Inhale 2 puffs Every 4 (Four) Hours As Needed for Wheezing., Disp: 8 g, Rfl: 10    aspirin 81 MG EC tablet, Take 1 tablet by mouth Daily., Disp: 90 tablet, Rfl: 3    clobetasol (TEMOVATE) 0.05 % external solution, Apply  topically to the appropriate area as directed 2 (Two) Times a Day., Disp: 50 mL, Rfl: 3    dilTIAZem CD (Cardizem CD) 300 MG 24 hr capsule, Take 1 capsule by mouth Daily., Disp: 90 capsule, Rfl: 2    fluconazole (DIFLUCAN) 150 MG tablet, Take 1 tablet by mouth Daily., Disp: 10 tablet, Rfl: 5    furosemide (LASIX) 40 MG tablet, Take 1 tablet by mouth Daily., Disp: 90 tablet, Rfl: 1    glipizide (Glucotrol) 5 MG tablet, Take 1 tablet by mouth 2 (Two) Times a Day Before Meals., Disp: 180 tablet, Rfl: 1    glucose blood (Accu-Chek Guide) test strip, 1 each by Other route Daily. Use as instructed, Disp: 100 each, Rfl: 2    losartan (COZAAR) 100 MG tablet, Take 1 tablet by mouth Daily., Disp: 90 tablet, Rfl: 1    nitroglycerin (NITROSTAT) 0.4 MG SL tablet, Place 1 tablet under the tongue Every 5 (Five) Minutes As Needed for Chest Pain., Disp: 30 tablet, Rfl: 5    nystatin (MYCOSTATIN) 100,000 unit/mL suspension, Swish and swallow 5 mL 4 (Four) Times a Day., Disp: 473 mL, Rfl: 5    pravastatin (PRAVACHOL) 40 MG tablet, TAKE 1 TABLET BY MOUTH DAILY, Disp: 90 tablet, Rfl: 0    Symbicort 160-4.5 MCG/ACT inhaler, Inhale 2 puffs 2 (Two) Times a Day., Disp: 3 each, Rfl: 5    azithromycin (ZITHROMAX) 250 MG tablet, Take 1 tablet by mouth Every Other Day. Indications: COPD Exacerbation Suppression (Patient not taking: Reported on 4/24/2024), Disp: 15 tablet, Rfl: 5    Insulin Glargine (LANTUS SOLOSTAR) 100 UNIT/ML injection pen, Inject 10 Units under the skin into the appropriate area as directed Every Night., Disp: 3 mL, Rfl: 11     "isosorbide mononitrate (IMDUR) 30 MG 24 hr tablet, Take 1 tablet by mouth Daily. (Patient not taking: Reported on 4/24/2024), Disp: 90 tablet, Rfl: 11    predniSONE (DELTASONE) 10 MG tablet, Take 1 tablet by mouth Daily. 5 po once a day for 2 days, 4 po once a day for 2 days, 3 po once a day for 2 days, 2 po once a day for 2 days, 1 po once a day for 2 days (Patient not taking: Reported on 4/24/2024), Disp: 30 tablet, Rfl: 0    Allergies:   No Known Allergies      Physical Exam:  Vital Signs:   Vitals:    04/24/24 1508   BP: 136/78   BP Location: Right arm   Patient Position: Sitting   Cuff Size: Adult   Pulse: 74   Resp: 15   SpO2: 99%   Weight: 66.2 kg (146 lb)   Height: 180.3 cm (71\")     Body mass index is 20.36 kg/m².     Physical Exam  Vitals and nursing note reviewed.   Constitutional:       Appearance: Normal appearance. He is normal weight.   HENT:      Head: Normocephalic and atraumatic.      Right Ear: Tympanic membrane, ear canal and external ear normal.      Left Ear: Tympanic membrane, ear canal and external ear normal.      Nose: Nose normal.      Mouth/Throat:      Mouth: Mucous membranes are dry.      Pharynx: Oropharynx is clear.   Eyes:      Extraocular Movements: Extraocular movements intact.      Conjunctiva/sclera: Conjunctivae normal.      Pupils: Pupils are equal, round, and reactive to light.   Cardiovascular:      Rate and Rhythm: Normal rate and regular rhythm.      Pulses: Normal pulses.      Heart sounds: Normal heart sounds.   Pulmonary:      Effort: Pulmonary effort is normal.      Breath sounds: Wheezing present.   Musculoskeletal:      Cervical back: Normal range of motion and neck supple.   Feet:      Comments:      Neurological:      Mental Status: He is alert.         Procedures      Assessment/Plan:   Diagnoses and all orders for this visit:    1. End stage COPD (Primary)  Assessment & Plan:  Patient sees Dr. Monroe.  Patient does not want to see him anymore.  I told will be " best if he is stated in the loop.  If he does not want to do the test he does not have to do them.  I still want him to see him though recheck in 1 month.    Orders:  -     Ambulatory Referral to Chronic Care Management Disease Education, Medication Adherence, Care Coordination, Preventative Care, Advanced Care Planning, Caregiving/Support, Barriers to Care, High Risk for ED/Readmission    2. Type 2 diabetes mellitus with hyperglycemia, without long-term current use of insulin  Assessment & Plan:  A1c is 9.2.  He does not want any metformin.  He is already on glipizide.  I think the best options going to be insulin.  We will start him on 10 units at night of insulin glargine.  Recheck in 1 month.  I will have him see [facility] for continuous glucose monitoring.    Orders:  -     Ambulatory Referral to Chronic Care Management Disease Education, Medication Adherence, Care Coordination, Preventative Care, Advanced Care Planning, Caregiving/Support, Barriers to Care, High Risk for ED/Readmission    3. Coronary artery disease of native artery of native heart with stable angina pectoris  Assessment & Plan:  Aggressive risk factor modification.  Patient is seeing cardiology    Orders:  -     Ambulatory Referral to Chronic Care Management Disease Education, Medication Adherence, Care Coordination, Preventative Care, Advanced Care Planning, Caregiving/Support, Barriers to Care, High Risk for ED/Readmission    4. Kidney lesion, native, right  Assessment & Plan:  This was seen on CT scan from hospital.  We will order renal ultrasound.    Orders:  -     US Renal Bilateral; Future    Other orders  -     Insulin Glargine (LANTUS SOLOSTAR) 100 UNIT/ML injection pen; Inject 10 Units under the skin into the appropriate area as directed Every Night.  Dispense: 3 mL; Refill: 11             Follow Up:   Return in about 1 month (around 5/24/2024) for Annual physical.      Betito Bowen MD  [facility]

## 2024-04-24 NOTE — ASSESSMENT & PLAN NOTE
A1c is 9.2.  He does not want any metformin.  He is already on glipizide.  I think the best options going to be insulin.  We will start him on 10 units at night of insulin glargine.  Recheck in 1 month.  I will have him see Dona for continuous glucose monitoring.

## 2024-04-25 ENCOUNTER — REFERRAL TRIAGE (OUTPATIENT)
Dept: CASE MANAGEMENT | Facility: OTHER | Age: 69
End: 2024-04-25
Payer: MEDICARE

## 2024-04-30 ENCOUNTER — TELEPHONE (OUTPATIENT)
Dept: FAMILY MEDICINE CLINIC | Facility: CLINIC | Age: 69
End: 2024-04-30
Payer: MEDICARE

## 2024-04-30 DIAGNOSIS — E11.65 TYPE 2 DIABETES MELLITUS WITH HYPERGLYCEMIA, WITHOUT LONG-TERM CURRENT USE OF INSULIN: Primary | ICD-10-CM

## 2024-04-30 NOTE — TELEPHONE ENCOUNTER
PATIENT STATED HIS INSURANCE WON'T PAY FOR HIS LANTUS SOLOSTAR. HE IS UNSURE ON WHAT TO DO, AND REQUESTED SOMETHING ELSE BE CALLED IN OR A P.A BE DONE.     HE REQUESTED TO BE CONTACTED [phone]

## 2024-05-01 ENCOUNTER — PATIENT OUTREACH (OUTPATIENT)
Dept: CASE MANAGEMENT | Facility: OTHER | Age: 69
End: 2024-05-01
Payer: MEDICARE

## 2024-05-01 DIAGNOSIS — J44.9 END STAGE COPD: ICD-10-CM

## 2024-05-01 DIAGNOSIS — I10 PRIMARY HYPERTENSION: ICD-10-CM

## 2024-05-01 DIAGNOSIS — E11.65 TYPE 2 DIABETES MELLITUS WITH HYPERGLYCEMIA, WITHOUT LONG-TERM CURRENT USE OF INSULIN: Primary | ICD-10-CM

## 2024-05-01 RX ORDER — INSULIN GLARGINE 100 [IU]/ML
10 INJECTION, SOLUTION SUBCUTANEOUS NIGHTLY
COMMUNITY
End: 2024-05-01 | Stop reason: SDUPTHER

## 2024-05-01 RX ORDER — INSULIN GLARGINE 100 [IU]/ML
10 INJECTION, SOLUTION SUBCUTANEOUS NIGHTLY
Qty: 15 ML | Refills: 5 | Status: SHIPPED | OUTPATIENT
Start: 2024-05-01

## 2024-05-01 NOTE — OUTREACH NOTE
AMBULATORY CASE MANAGEMENT NOTE    Names and Relationships of Patient/Support Persons: Caller: Iker Barber; Relationship: Self -     Adult Patient Profile  Questions/Answers      Flowsheet Row Most Recent Value   Symptoms/Conditions Managed at Home diabetes, type 2, respiratory, cardiovascular   Barriers to Managing Health financial resources, stress of chronic illness, understanding health advice   Cardiovascular Symptoms/Conditions heart failure   Cardiovascular Management Strategies activity, routine screening, coping strategies, diet modification, exercise, fluid modification, medication therapy, weight management   Diabetes Management Strategies activity, blood glucose testing, coping strategies, diet modification, exercise, insulin therapy, medication therapy, routine screenings, weight management   Frequency of Blood Glucose Testing before meals and at bedtime   A1C Target 7.5   Last A1C Result 9.2   Respiratory Symptoms/Conditions COPD, cough, shortness of breath   Respiratory Management Strategies coping strategies, breathing exercise, breathing techniques, oxygen therapy, adequate rest, medication therapy, peak flow meter, routine screening   Oxygen Therapy Device nasal cannula   Oxygen Therapy Times continuous   How Well Do You Speak English? very well   Source of Information patient   Hearing Difficulty or Deaf no   Wear Glasses or Blind yes   Vision Management wears glasses   Concentrating, Remembering or Making Decisions Difficulty no   Difficulty Communicating no   Walking or Climbing Stairs Difficulty yes   Walking or Climbing Stairs other (see comments), ambulation difficulty, requires equipment   Mobility Management struggles with distances   Doing Errands Independently Difficulty (such as shopping) yes   Errands Management his daughter and wife assists   Equipment Currently Used at Home cane, straight, nebulizer, oxygen, bp cuff   Pre-existing AND/MOST/POLST Order No   Advance Directive  "Status Patient has advance directive, copy requested  [pt declines to bring copy to office]   Have you reviewed your Advance Directive and is it valid for this stay? No   Fall Risk Category Moderate        Social Work Assessment  Questions/Answers      Flowsheet Row Most Recent Value   Equipment Currently Used at Home cane, straight, nebulizer, oxygen, bp cuff        [facility] Interim Update    Spoke with mr. Barber and explained [facility] services including billing. Verbal consent given. He has been having trouble getting his lantus filled. He is going to pick it up today and states that it will cost him $110. He states he had his blood sugar under control but that when he was instructed to increase his calorie intake due to weight loss that his A1C increased to 9.2. He is open to referral to [place] for CGM and believes that more consistent monitoring will assist.    Discussed advanced directives. He states that he does have advanced directives. He does not specify what kind. When asked to bring a copy to the office for his file he declines. He states \"they\" know where it is and that he has specified everything. He states he does not want any heroic measures.     [facility] believes that patient would benefit from a portable concentrator. He states he has a concentrator. [facility] attempted to explain what a portable concentrator is and that he would need to follow up with pulmonary. He then focuses on not wanting more testing. He is going through a minimum of 6 portable tanks a month and his tanks only last for 20 minutes.     Care Coordination    [facility] contacted patient's pharmacy for part D information.     [facility] [id number]  [facility]  ID [id number]  Grp 2FGA    Gave information to SHAKIR QURESHI. Was able to obtain PA for lantus.     Call placed to [facility] [phone] to refer for Freestyle Bela 3. Clinical notes faxed.       Education Documentation  No documentation found.        Marta CHOI  Ambulatory Case " Management    5/1/2024, 11:33 EDT

## 2024-05-01 NOTE — TELEPHONE ENCOUNTER
PA sent for Lali Astorga through cover my meds. Waiting on a response. Will probably deny because they want patient to try Basaglar kwikpen, Tresiba.        PA was approved

## 2024-05-03 ENCOUNTER — TELEPHONE (OUTPATIENT)
Dept: CASE MANAGEMENT | Facility: OTHER | Age: 69
End: 2024-05-03
Payer: MEDICARE

## 2024-05-03 ENCOUNTER — PATIENT OUTREACH (OUTPATIENT)
Dept: CASE MANAGEMENT | Facility: OTHER | Age: 69
End: 2024-05-03
Payer: MEDICARE

## 2024-05-03 ENCOUNTER — TELEPHONE (OUTPATIENT)
Dept: CARDIOLOGY | Facility: CLINIC | Age: 69
End: 2024-05-03
Payer: MEDICARE

## 2024-05-03 DIAGNOSIS — I10 PRIMARY HYPERTENSION: ICD-10-CM

## 2024-05-03 DIAGNOSIS — J44.9 END STAGE COPD: ICD-10-CM

## 2024-05-03 DIAGNOSIS — E11.65 TYPE 2 DIABETES MELLITUS WITH HYPERGLYCEMIA, WITHOUT LONG-TERM CURRENT USE OF INSULIN: Primary | ICD-10-CM

## 2024-05-03 NOTE — OUTREACH NOTE
AMBULATORY CASE MANAGEMENT NOTE    Names and Relationships of Patient/Support Persons: Caller: Iker Barber; Relationship: Self -     CCM Interim Update    Spoke with mr. Barber and answered his questions regarding billing. He was able to get his basaglar insulin and feels comfortable using it. Updated him that he should be hearing from [employer] [employer] regarding CGM.  He is aware to be looking for a call from an [facility].         Education Documentation  No documentation found.        Marta CHOI  Ambulatory Case Management    5/3/2024, 15:44 EDT

## 2024-05-03 NOTE — TELEPHONE ENCOUNTER
Pt will need a cardiac cath, we discuss about the risk of the procedure which include, stroke, death, MI, , bleeding, etc. The patient understand the situation, will let us know soon .  . Pt will call us on Monday with decision on if they want to do it or not .

## 2024-05-07 DIAGNOSIS — N28.9 KIDNEY LESION, NATIVE, RIGHT: ICD-10-CM

## 2024-05-08 ENCOUNTER — TELEPHONE (OUTPATIENT)
Dept: CASE MANAGEMENT | Facility: OTHER | Age: 69
End: 2024-05-08
Payer: MEDICARE

## 2024-05-20 ENCOUNTER — TELEPHONE (OUTPATIENT)
Dept: CASE MANAGEMENT | Facility: OTHER | Age: 69
End: 2024-05-20
Payer: MEDICARE

## 2024-05-23 ENCOUNTER — OFFICE VISIT (OUTPATIENT)
Dept: FAMILY MEDICINE CLINIC | Facility: CLINIC | Age: 69
End: 2024-05-23
Payer: MEDICARE

## 2024-05-23 VITALS
HEART RATE: 107 BPM | RESPIRATION RATE: 16 BRPM | OXYGEN SATURATION: 99 % | HEIGHT: 71 IN | SYSTOLIC BLOOD PRESSURE: 116 MMHG | BODY MASS INDEX: 20.45 KG/M2 | DIASTOLIC BLOOD PRESSURE: 72 MMHG | WEIGHT: 146.1 LBS

## 2024-05-23 DIAGNOSIS — J44.9 END STAGE COPD: Primary | ICD-10-CM

## 2024-05-23 DIAGNOSIS — I25.118 CORONARY ARTERY DISEASE OF NATIVE ARTERY OF NATIVE HEART WITH STABLE ANGINA PECTORIS: ICD-10-CM

## 2024-05-23 DIAGNOSIS — E11.65 TYPE 2 DIABETES MELLITUS WITH HYPERGLYCEMIA, WITHOUT LONG-TERM CURRENT USE OF INSULIN: ICD-10-CM

## 2024-05-23 DIAGNOSIS — I10 PRIMARY HYPERTENSION: ICD-10-CM

## 2024-05-23 DIAGNOSIS — N28.9 KIDNEY LESION, NATIVE, RIGHT: ICD-10-CM

## 2024-05-23 DIAGNOSIS — E78.2 HYPERLIPIDEMIA, MIXED: ICD-10-CM

## 2024-05-23 PROCEDURE — 1126F AMNT PAIN NOTED NONE PRSNT: CPT | Performed by: FAMILY MEDICINE

## 2024-05-23 PROCEDURE — 3046F HEMOGLOBIN A1C LEVEL >9.0%: CPT | Performed by: FAMILY MEDICINE

## 2024-05-23 PROCEDURE — G2211 COMPLEX E/M VISIT ADD ON: HCPCS | Performed by: FAMILY MEDICINE

## 2024-05-23 PROCEDURE — 99214 OFFICE O/P EST MOD 30 MIN: CPT | Performed by: FAMILY MEDICINE

## 2024-05-23 RX ORDER — PEN NEEDLE, DIABETIC 32GX 5/32"
1 NEEDLE, DISPOSABLE MISCELLANEOUS TAKE AS DIRECTED
COMMUNITY
Start: 2024-05-09

## 2024-05-23 RX ORDER — IPRATROPIUM BROMIDE AND ALBUTEROL SULFATE 2.5; .5 MG/3ML; MG/3ML
3 SOLUTION RESPIRATORY (INHALATION)
COMMUNITY
Start: 2024-05-03 | End: 2024-05-23 | Stop reason: SDUPTHER

## 2024-05-23 RX ORDER — IPRATROPIUM BROMIDE AND ALBUTEROL SULFATE 2.5; .5 MG/3ML; MG/3ML
3 SOLUTION RESPIRATORY (INHALATION)
Qty: 360 ML | Refills: 3 | Status: SHIPPED | OUTPATIENT
Start: 2024-05-23

## 2024-05-23 NOTE — ASSESSMENT & PLAN NOTE
Patient is oxygen dependent.  He is agreeable to go back to see Dr. Pennington.  We will recheck in 3 months.  Blood work 1 week prior.

## 2024-05-23 NOTE — PROGRESS NOTES
Patient Name: Iker Barber  : 1955   MRN: [id number]     Chief Complaint:    Chief Complaint   Patient presents with    Follow-up       History of Present Illness: Iker Barber is a 69 y.o. male who is here today for follow up.on breathing and renal U/S.   HPI        Review of Systems:   Review of Systems   Constitutional:  Positive for fatigue.   HENT: Negative.     Eyes: Negative.    Respiratory:  Positive for shortness of breath.    Cardiovascular: Negative.    Gastrointestinal: Negative.    Neurological: Negative.         Past Medical History:   Past Medical History:   Diagnosis Date    Anxiety     Arthritis     Asthma     Diabetes     Elevated blood pressure reading     Emphysema, unspecified     Esophageal reflux     Hearing problem     Heart disease     Hypercholesterolemia     Irritable bowel syndrome (IBS)     Peripheral artery disease     Skin disorder     Stroke (cerebrum)        Past Surgical History: No past surgical history on file.    Family History:   Family History   Problem Relation Age of Onset    Allergies Mother     Alzheimer's disease Mother     Diabetes Mother     Diabetes Father     Diabetes Brother        Social History:   Social History     Socioeconomic History    Marital status:    Tobacco Use    Smoking status: Former     Current packs/day: 0.00     Average packs/day: 1 pack/day for 40.0 years (40.0 ttl pk-yrs)     Types: Cigarettes     Start date:      Quit date:      Years since quittin.3     Passive exposure: Past    Smokeless tobacco: Never   Vaping Use    Vaping status: Never Used   Substance and Sexual Activity    Alcohol use: Not Currently    Drug use: Never    Sexual activity: Not Currently     Partners: Female       Medications:     Current Outpatient Medications:     Accu-Chek Softclix Lancets lancets, by Other route. Use as instructed, Disp: , Rfl:     albuterol (PROVENTIL) (2.5 MG/3ML) 0.083% nebulizer solution, Take 2.5 mg by  nebulization Every 4 (Four) Hours As Needed for Shortness of Air., Disp: 540 each, Rfl: 1    albuterol sulfate  (90 Base) MCG/ACT inhaler, Inhale 2 puffs Every 4 (Four) Hours As Needed for Wheezing., Disp: 8 g, Rfl: 10    aspirin 81 MG EC tablet, Take 1 tablet by mouth Daily., Disp: 90 tablet, Rfl: 3    azithromycin (ZITHROMAX) 250 MG tablet, Take 1 tablet by mouth Every Other Day. Indications: COPD Exacerbation Suppression (Patient taking differently: Take 1 tablet by mouth Every Other Day.), Disp: 15 tablet, Rfl: 5    BD Pen Needle Sumi 2nd Gen 32G X 4 MM misc, 1 each by Other route Take As Directed., Disp: , Rfl:     clobetasol (TEMOVATE) 0.05 % external solution, Apply  topically to the appropriate area as directed 2 (Two) Times a Day., Disp: 50 mL, Rfl: 3    dilTIAZem CD (Cardizem CD) 300 MG 24 hr capsule, Take 1 capsule by mouth Daily., Disp: 90 capsule, Rfl: 2    fluconazole (DIFLUCAN) 150 MG tablet, Take 1 tablet by mouth Daily., Disp: 10 tablet, Rfl: 5    furosemide (LASIX) 40 MG tablet, Take 1 tablet by mouth Daily., Disp: 90 tablet, Rfl: 1    glipizide (Glucotrol) 5 MG tablet, Take 1 tablet by mouth 2 (Two) Times a Day Before Meals., Disp: 180 tablet, Rfl: 1    glucose blood (Accu-Chek Guide) test strip, 1 each by Other route Daily. Use as instructed, Disp: 100 each, Rfl: 2    Insulin Glargine (BASAGLAR KWIKPEN) 100 UNIT/ML injection pen, Inject 10 Units under the skin into the appropriate area as directed Every Night., Disp: 15 mL, Rfl: 5    ipratropium-albuterol (DUO-NEB) 0.5-2.5 mg/3 ml nebulizer, Take 3 mL by nebulization 4 (Four) Times a Day., Disp: 360 mL, Rfl: 3    losartan (COZAAR) 100 MG tablet, Take 1 tablet by mouth Daily., Disp: 90 tablet, Rfl: 1    nitroglycerin (NITROSTAT) 0.4 MG SL tablet, Place 1 tablet under the tongue Every 5 (Five) Minutes As Needed for Chest Pain., Disp: 30 tablet, Rfl: 5    nystatin (MYCOSTATIN) 100,000 unit/mL suspension, Swish and swallow 5 mL 4 (Four) Times  "a Day., Disp: 473 mL, Rfl: 5    pravastatin (PRAVACHOL) 40 MG tablet, TAKE 1 TABLET BY MOUTH DAILY, Disp: 90 tablet, Rfl: 0    predniSONE (DELTASONE) 10 MG tablet, Take 1 tablet by mouth Daily. 5 po once a day for 2 days, 4 po once a day for 2 days, 3 po once a day for 2 days, 2 po once a day for 2 days, 1 po once a day for 2 days, Disp: 30 tablet, Rfl: 0    Symbicort 160-4.5 MCG/ACT inhaler, Inhale 2 puffs 2 (Two) Times a Day., Disp: 3 each, Rfl: 5    Allergies:   No Known Allergies      Physical Exam:  Vital Signs:   Vitals:    05/23/24 1457   Resp: 16   Weight: 66.3 kg (146 lb 1.6 oz)   Height: 180.3 cm (70.98\")     Body mass index is 20.39 kg/m².     Physical Exam  Vitals and nursing note reviewed.   Constitutional:       Appearance: Normal appearance. He is normal weight.   HENT:      Head: Normocephalic and atraumatic.      Right Ear: Tympanic membrane, ear canal and external ear normal.      Left Ear: Tympanic membrane, ear canal and external ear normal.      Nose: Nose normal.      Mouth/Throat:      Mouth: Mucous membranes are dry.      Pharynx: Oropharynx is clear.   Eyes:      Extraocular Movements: Extraocular movements intact.      Conjunctiva/sclera: Conjunctivae normal.      Pupils: Pupils are equal, round, and reactive to light.   Cardiovascular:      Rate and Rhythm: Normal rate and regular rhythm.      Pulses: Normal pulses.      Heart sounds: Normal heart sounds.   Pulmonary:      Effort: Pulmonary effort is normal.      Breath sounds: Normal breath sounds.   Musculoskeletal:      Cervical back: Normal range of motion and neck supple.   Feet:      Comments:      Neurological:      Mental Status: He is alert.         Procedures      Assessment/Plan:   Diagnoses and all orders for this visit:    1. End stage COPD (Primary)  Assessment & Plan:  Patient is oxygen dependent.  He is agreeable to go back to see Dr. Pennington.  We will recheck in 3 months.  Blood work 1 week prior.    Orders:  -     " Hemoglobin A1c; Future  -     Lipid Panel; Future  -     Comprehensive Metabolic Panel; Future  -     Vitamin B12; Future  -     TSH Rfx On Abnormal To Free T4; Future  -     CBC & Differential; Future    2. Kidney lesion, native, right  Assessment & Plan:  Ultrasound showed lesions likely representing renal cysts.    Orders:  -     Hemoglobin A1c; Future  -     Lipid Panel; Future  -     Comprehensive Metabolic Panel; Future  -     Vitamin B12; Future  -     TSH Rfx On Abnormal To Free T4; Future  -     CBC & Differential; Future    3. Primary hypertension  Assessment & Plan:  Discussed with patient to monitor their blood pressure and if systolic blood pressure goes above 140 or diastolic is above 90 to return to clinic.  Take medicines as directed, call for any problems, patient not having or any worrisome symptoms.        Orders:  -     Hemoglobin A1c; Future  -     Lipid Panel; Future  -     Comprehensive Metabolic Panel; Future  -     Vitamin B12; Future  -     TSH Rfx On Abnormal To Free T4; Future  -     CBC & Differential; Future    4. Hyperlipidemia, mixed  Assessment & Plan:  HDL 49.  LDL 56.  Recheck in 6 months.    Orders:  -     Hemoglobin A1c; Future  -     Lipid Panel; Future  -     Comprehensive Metabolic Panel; Future  -     Vitamin B12; Future  -     TSH Rfx On Abnormal To Free T4; Future  -     CBC & Differential; Future    5. Coronary artery disease of native artery of native heart with stable angina pectoris  Assessment & Plan:  Aggressive risk factor modification.    Orders:  -     Hemoglobin A1c; Future  -     Lipid Panel; Future  -     Comprehensive Metabolic Panel; Future  -     Vitamin B12; Future  -     TSH Rfx On Abnormal To Free T4; Future  -     CBC & Differential; Future    6. Type 2 diabetes mellitus with hyperglycemia, without long-term current use of insulin  Assessment & Plan:  Last A1c was 9.2.  We started patient on insulin.  He is taking 10 units of insulin glargine at  night.    Orders:  -     Hemoglobin A1c; Future  -     Lipid Panel; Future  -     Comprehensive Metabolic Panel; Future  -     Vitamin B12; Future  -     TSH Rfx On Abnormal To Free T4; Future  -     CBC & Differential; Future    Other orders  -     ipratropium-albuterol (DUO-NEB) 0.5-2.5 mg/3 ml nebulizer; Take 3 mL by nebulization 4 (Four) Times a Day.  Dispense: 360 mL; Refill: 3             Follow Up:   Return in about 3 months (around 8/23/2024) for Annual physical, Bloodwork 1 week prior to next appointment.      Betito Bowen MD  [facility]

## 2024-05-23 NOTE — ASSESSMENT & PLAN NOTE
Last A1c was 9.2.  We started patient on insulin.  He is taking 10 units of insulin glargine at night.

## 2024-05-28 ENCOUNTER — TELEPHONE (OUTPATIENT)
Dept: FAMILY MEDICINE CLINIC | Facility: CLINIC | Age: 69
End: 2024-05-28
Payer: MEDICARE

## 2024-05-28 NOTE — TELEPHONE ENCOUNTER
Caller: [employer]    Relationship: Other    Best call back number: [phone]   EXTENSION: [id number]    What form or medical record are you requesting: CLINICAL NOTES FROM PATIENTS LAST VISIT     Who is requesting this form or medical record from you: [employer]    How would you like to receive the form or medical records (pick-up, mail, fax): FAX  If fax, what is the fax number: [phone]  If mail, what is the address:   If pick-up, provide patient with address and location details    Timeframe paperwork needed: AS SOON AS POSSIBLE     Additional notes:

## 2024-05-28 NOTE — TELEPHONE ENCOUNTER
PT CALLED IN RIGHT AFTER THE [facility] REP STATING THAT DR PRICE NEEDS TO SIGN AND RETURN THE PAPERWORK TO [facility].

## 2024-05-31 ENCOUNTER — TELEPHONE (OUTPATIENT)
Dept: FAMILY MEDICINE CLINIC | Facility: CLINIC | Age: 69
End: 2024-05-31
Payer: MEDICARE

## 2024-05-31 ENCOUNTER — PATIENT OUTREACH (OUTPATIENT)
Dept: CASE MANAGEMENT | Facility: OTHER | Age: 69
End: 2024-05-31
Payer: MEDICARE

## 2024-05-31 DIAGNOSIS — E11.65 TYPE 2 DIABETES MELLITUS WITH HYPERGLYCEMIA, WITHOUT LONG-TERM CURRENT USE OF INSULIN: Primary | ICD-10-CM

## 2024-05-31 DIAGNOSIS — J44.9 END STAGE COPD: ICD-10-CM

## 2024-05-31 DIAGNOSIS — I10 PRIMARY HYPERTENSION: ICD-10-CM

## 2024-05-31 NOTE — OUTREACH NOTE
[facility] End of Month Documentation    This Chronic Medical Management Care Plan for Iker Barber, 69 y.o. male, has been a new plan of care implemented; established and a new plan of care implemented for the month of May.  A cumulative time of 60  minutes was spent on this patient record this month, including phone call with patient; chart review; face to face with provider.    Regarding the patient's problems: has Type 2 diabetes mellitus with hyperglycemia, without long-term current use of insulin; Primary hypertension; End stage COPD; Medicare annual wellness visit, subsequent; Bilateral leg edema; Loss of weight; Hyperlipidemia, mixed; Dysuria; Candida infection, oral; Coronary artery disease of native artery with stable angina pectoris; Shortness of breath; and Kidney lesion, native, right on their problem list., the following items were addressed: medical records; medications; changes to medical care; referrals to community service providers; transitions to medical care and any changes can be found within the plan section of the note.  A detailed listing of time spent for chronic care management is tracked within each outreach encounter.  Current medications include:  has a current medication list which includes the following prescription(s): accu-chek softclix lancets, albuterol, albuterol sulfate hfa, aspirin, azithromycin, bd pen needle mansi 2nd gen, clobetasol, diltiazem cd, fluconazole, furosemide, glipizide, accu-chek guide, basaglar kwikpen, ipratropium-albuterol, losartan, nitroglycerin, nystatin, pravastatin, prednisone, and symbicort. and the patient is reported to be patient is compliant with medication protocol,  Medications are reported to be non-effective in controlling symptoms and changes have been made to the medication protocol, A1C 9.2.  Regarding these diagnoses, referrals were made to the following provider(s):  [facility] medical.  All notes on chart for PCP to review.    The patient was  "monitored remotely for blood pressure; weight; blood glucose; medications.    The patient's physical needs include:  help taking medications as prescribed; physical healthcare; medication education.     The patient's mental support needs include:  needs met    The patient's cognitive support needs include:  needs met    The patient's psychosocial support needs include:  continued support    The patient's functional needs include: physical healthcare; DME, referral to [facility] medical for cgm    The patient's environmental needs include:  not applicable    Care Plan overall comments:  established    Refer to previous outreach notes for more information on the areas listed above.    Monthly Billing Diagnoses  (E11.65) Type 2 diabetes mellitus with hyperglycemia, without long-term current use of insulin    (I10) Primary hypertension    (J44.9) End stage COPD    Medications   Medications have been reconciled    Care Plan progress this month:      Recently Modified Care Plans Updates made since 4/30/2024 12:00 AM       COPD (Adult)           Problem Priority Last Modified     Psychological Adjustment to Diagnosis (COPD) --  5/1/2024 11:20 AM by Marta Young RN              Goal Recent Progress Last Modified     Adjustment to Disease Achieved --  5/1/2024 11:20 AM by Marta Young, RN     Evidence-based guidance:   Explore the patient and family's understanding of the disease; use open-ended questions to encourage patient and family to share what is important to them.   Assess and provide support around experience of loss and lifestyle adjustments, such as loss of function, roles, social ties, independence and hobbies.   Support adjustment to  €œnew normal\" with focus on maintaining daily life as closely as possible to life before chronic obstructive pulmonary disease (COPD) diagnosis.   Express empathy; listen actively by encouraging the patient and family to express feelings, concerns and fears; ask questions and " encourage open communication regarding embarrassing or disturbing topics.   Assess for factors that may impact coping or adjustment, such as a preexisting mental health condition, prognosis, lack of social support, debilitating disease or financial difficulty that include no or insufficient insurance coverage.   Assess and address fear or anxiety related to dyspnea or perceived stigma of a noninfectious cough.   Prepare for re-entry into work, school and social activities; establish links or collaborate with mental health resources in the community, such as peer support or advocacy groups.   Assess anxiety, feelings of panic when breathing becomes labored, as well as impact on family/caregiver; consider cognitive behavioral therapy, deep breathing, meditation, visualization, photo or light therapy.   Encourage advanced care planning discussion to clarify goals of care; palliative care or hospice may be considered for advanced COPD patients if it aligns with patient's goals of care.   Explore common risk factors for depression, such as personal or family history of depression, substance use and stressors that include missed work, losing ability to do what patient was used to doing, changes in appearance, physical    or sexual abuse.   Destigmatize depression by presenting it as a problem requiring treatment, rather than a personal weakness.   Use a validated screening tool to identify level of suicide risk. If at risk, develop safety plan and implement additional safety measures based on level of risk, such as behavioral health referral or immediate assessment.    Notes:            Task Due Date Last Modified     Support Psychosocial Response to Chronic Obstructive Pulmonary Disease --  5/1/2024 11:20 AM by Marta Young, RN     Care Management Activities:      - not discussed during this outreach      Notes:              Problem Priority Last Modified     Disease Progression (COPD) --  5/1/2024 11:20 AM by  Marta Young, RN              Goal Recent Progress Last Modified     Disease Progression Minimized or Managed --  5/1/2024 11:20 AM by Marta Young RN     Evidence-based guidance:   Identify current smoking/tobacco use; provide smoking cessation intervention.   Assess symptom control by the frequency and type of symptoms, reliever use and activity limitation at every encounter.   Assess risk for exacerbation (flare up) by evaluating spirometry, pulse oximetry, reliever use, presentation of symptoms and activity limitation; anticipate treatment adjustment based on risks and resources.   Develop and/or review and reinforce use of COPD rescue (action) plan even when symptoms are controlled or infrequent.   Ask patient to bring inhaler to all visits; assess and reinforce correct technique; address barriers to proper inhaler use, such as older age, use of multiple devices and lack of understanding.    Identify symptom triggers, such as smoking, virus, weather change, emotional upset, exercise, obesity and environmental allergen; consider reduction of work-exposure versus elimination to avoid compromising employment.   Correlate presentation to comorbidity, such as diabetes, heart failure, obstructive sleep apnea, depression and anxiety, which may worsen symptoms.   Prepare for individualized pharmacologic therapy that may include LABA (long-acting beta-2 agonist), LAMA (long-acting muscarinic antagonist), DUYEN (short-acting beta-2 agonist) oral or inhaled corticosteroid.   Promote participation in pulmonary rehabilitation for breathing exercises, skills training, improved exercise capacity, mood and quality of life; address barriers to participation.   Promote physical activity or exercise to improve or maintain exercise capacity, based on tolerance that may include walking, water exercise, cycling or limb muscle strength training.   Promote use of energy conservation and activity pacing techniques.   Promote  use of breathing and coughing techniques, such as inspiratory muscle training, pursed-lip breathing, diaphragmatic breathing, pranayama yoga breathing or hummel cough.   Screen for malnutrition risk factors, such as unintentional weight loss and poor oral intake; refer to dietitian if identified.   Consider recommendation for oral drink supplement or multivitamin and mineral supplements if suspect inadequate oral intake or micronutrient deficiencies.    Screen for obstructive sleep apnea; prepare patient for polysomnography based on risk and presentation.   Prepare patient for use of long-term oxygen and noninvasive ventilation to relieve hypercapnia, hypoxemia, obstructive sleep apnea and reduce work of breathing.   Prepare patient with worsening disease for surgical interventions that may include bronchoscopy, lung volume reduction surgery, bullectomy or lung transplantation.    Notes:            Task Due Date Last Modified     Alleviate Barriers to COPD Management --  5/1/2024 11:20 AM by Marta Young RN     Care Management Activities:      - self-awareness of symptom triggers encouraged      Notes:              Problem Priority Last Modified     Symptom Exacerbation (COPD) --  5/1/2024 11:20 AM by Marta Young RN              Goal Recent Progress Last Modified     Symptom Exacerbation Prevented or Minimized --  5/1/2024 11:20 AM by Marta Young RN     Evidence-based guidance:   Monitor for signs of respiratory infection, including changes in sputum color, volume and thickness, as well as fever.   Encourage infection prevention strategies that may include prophylactic antibiotic therapy for patients with history of frequent exacerbations or antibiotic administration during exacerbation based on presentation, risk and benefit.   Encourage receipt of influenza and pneumococcal vaccine.   Prepare patient for use of home long-term oxygen therapy in presence of sever resting hypoxemia.   Prepare  patients for laboratory studies or diagnostic exams, such as spirometry, pulse oximetry and arterial blood gas based on current symptoms, risk factors and presentation.   Assess barriers and manage adherence, including inhaler technique and persistent trigger exposure; encourage adherence, even when symptoms are controlled or infrequent.   Assess and monitor for signs/symptoms of psychosocial concerns, such as shortness of breath-anxiety cycle or depression that may impact stability of symptoms.   Identify economic resources, sociocultural beliefs, social factors and health literacy that may interfere with adherence.   Promote lifestyle changes when needed, including regular physical activity based on tolerance, weight loss, healthy eating and stress management.   Consider referral to nurse or community health worker or home-visiting program for intensive support and education (disease-management program).   Increase frequency of follow-up following exacerbation or hospitalization; consider transition of care interventions, such as hospital visit, home visit, telephone follow-up, review of discharge summary and resource referrals.    Notes:            Task Due Date Last Modified     Identify and Minimize Risk of COPD Exacerbation --  5/1/2024 11:20 AM by Marta Young RN     Care Management Activities:      - not discussed during this outreach      Notes:                   Diabetes Type 2 (Adult)           Problem Priority Last Modified     Glycemic Management (Diabetes, Type 2) --  5/1/2024 11:21 AM by Marta Young RN              Goal Recent Progress Last Modified     Glycemic Management Optimized --  5/1/2024 11:21 AM by Marta Young RN     Evidence-based guidance:   Anticipate A1C testing (point-of-care) every 3 to 6 months based on goal attainment.   Review mutually-set A1C goal or target range.   Anticipate use of antihyperglycemic with or without insulin and periodic adjustments; consider  active involvement of pharmacist.   Provide medical nutrition therapy and development of individualized eating.   Compare self-reported symptoms of hypo or hyperglycemia to blood glucose levels, diet and fluid intake, current medications, psychosocial and physiologic stressors, change in activity and barriers to care adherence.   Promote self-monitoring of blood glucose levels.   Assess and address barriers to management plan, such as food insecurity, age, developmental ability, depression, anxiety, fear of hypoglycemia or weight gain, as well as medication cost, side effects and complicated regimen.   Consider referral to community-based diabetes education program, visiting nurse, community health worker or health .   Encourage regular dental care for treatment of periodontal disease; refer to dental provider when needed.    Notes:            Task Due Date Last Modified     Alleviate Barriers to Glycemic Management --  5/1/2024 11:21 AM by Marta Young RN     Care Management Activities:      - A1C testing facilitated      Notes:              Problem Priority Last Modified     Disease Progression (Diabetes, Type 2) --  5/1/2024 11:21 AM by Marta Young RN              Goal Recent Progress Last Modified     Disease Progression Prevented or Minimized --  5/1/2024 11:21 AM by Marta Young RN     Evidence-based guidance:   Prepare patient for laboratory and diagnostic exams based on risk and presentation.   Encourage lifestyle changes, such as increased intake of plant-based foods, stress reduction, consistent physical activity and smoking cessation to prevent long-term complications and chronic disease.    Individualize activity and exercise recommendations while considering potential limitations, such as neuropathy, retinopathy or the ability to prevent hyperglycemia or hypoglycemia.    Prepare patient for use of pharmacologic therapy that may include antihypertensive, analgesic, prostaglandin  E1 with periodic adjustments, based on presenting chronic condition and laboratory results.   Assess signs/symptoms and risk factors for hypertension, sleep-disordered breathing, neuropathy (including changes in gait and balance), retinopathy, nephropathy and sexual dysfunction.   Address pregnancy planning and contraceptive choice, especially when prescribing antihypertensive or statin.   Ensure completion of annual comprehensive foot exam and dilated eye exam.    Implement additional individualized goals and interventions based on identified risk factors.   Prepare patient for consultation or referral for specialist care, such as ophthalmology, neurology, cardiology, podiatry, nephrology or perinatology.    Notes:            Task Due Date Last Modified     Monitor and Manage Follow-up for Comorbidities --  5/1/2024 11:21 AM by Marta Young RN     Care Management Activities:      - not discussed during this outreach      Notes:                   Heart Failure (Adult)           Problem Priority Last Modified     Symptom Exacerbation (Heart Failure) --  5/1/2024 11:21 AM by Marta Young RN              Goal Recent Progress Last Modified     Symptom Exacerbation Prevented or Minimized --  5/1/2024 11:21 AM by Marta Young RN     Evidence-based guidance:   Perform or review cognitive and/or health literacy screening.   Assess understanding of adherence and barriers to treatment plan, as well as lifestyle changes; develop strategies to address barriers.   Establish a vmuoymbe-ugvbqo-isck early intervention process to communicate with primary care provider when signs/symptoms worsen.   Facilitate timely posthospital discharge or emergency department treatment that includes intensive follow-up via telephone calls, home visit, telehealth monitoring and care at multidisciplinary heart failure clinic.   Adjust frequency and intensity of follow-up based on presentation, number of emergency department visits,  hospital admissions and frequency and severity of symptom exacerbation.   Facilitate timely visit, usually within 1 week, with primary care provider following hospital discharge.   Collaborate with clinical pharmacist to address adverse drug reactions, drug interactions, subtherapeutic dosage, patient and family education.   Regularly screen for presence of depressive symptoms using a validated tool; consider pharmacologic therapy and/or referral for cognitive behavioral therapy when present.   Refer to community-based services, such as a heart failure support group, community health worker or peer support program.   Review immunization status; arrange receipt of needed vaccinations.   Prepare patient for home oxygen use based on signs/symptoms.    Notes:            Task Due Date Last Modified     Identify and Minimize Risk of Heart Failure Exacerbation --  5/1/2024 11:21 AM by Marta Young RN     Care Management Activities:      - self-awareness of signs/symptoms of worsening disease encouraged      Notes:              Problem Priority Last Modified     Disease Progression (Heart Failure) --  5/1/2024 11:21 AM by Marta Young RN              Goal Recent Progress Last Modified     Comorbidities Identified and Managed --  5/1/2024 11:21 AM by Marta Young RN     Evidence-based guidance:   Assess and address signs/symptoms of comorbidity, including dyslipidemia, diabetes, iron deficiency, gout, arthritis, dysrhythmia, hypertension, cachexia, coronary artery disease, kidney dysfunction and lung disease.   Prepare patient for laboratory and diagnostic exams based on risk and presentation.   Prepare for use of pharmacologic therapy that may include statin, angiotensin converting enzyme (ACE) inhibitor, angiotensin receptor blocker (ARB), beta-blocker, digoxin, antidysrhythmic, diuretic or omega-3 fatty acid.   Monitor side effects and anticipate need for periodic adjustments.   Prepare patient for  potential invasive treatment, such as implantable cardioverter-defibrillator, cardiac resynchronization therapy or heart transplant as disease progresses.    Notes:            Task Due Date Last Modified     Identify and Manage Comorbidities --  5/1/2024 11:21 AM by Marta Young RN     Care Management Activities:      - not discussed during this outreach      Notes:            Goal Recent Progress Last Modified     Health Optimized --  5/1/2024 11:21 AM by Marta Young RN     Evidence-based guidance:   Use brief intervention, such as 5 A's (Ask, Advise, Assess, Assist, Arrange) to encourage smoking cessation; refer to smoking cessation program, if ready for more intensive intervention.   Perform or refer to a registered dietitian for a nutrition assessment and nutrition-focused physical exam.    Identify potential micronutrient deficiencies, such as iron, vitamin D and thiamin.   Assess need for potential diet and fluid modification, such as reduced sodium or fluid intake.   Minimize unnecessary dietary restrictions to increase oral intake. Note: Sodium restriction should be individualized to the patient and clinical status.   Facilitate home monitoring of weight.    Notes:            Task Due Date Last Modified     Optimize Health --  5/1/2024 11:21 AM by Marta Young RN     Care Management Activities:      - home monitoring of blood pressure encouraged      Notes:              Problem Priority Last Modified     Activity Tolerance (Heart Failure) --  5/1/2024 11:21 AM by Marta Young RN              Goal Recent Progress Last Modified     Activity Tolerance Optimized --  5/1/2024 11:21 AM by Marta Young RN     Evidence-based guidance:   Promote daily physical activity that improves functional ability, cognition and quality of life.   Encourage reduction in sedentary time.    Encourage optimal, safe functional mobility and self-care performance based on ability and tolerance.    Promote  breathing and energy conservation techniques, such as pursed-lip breathing, preplanning and pacing of activity, balancing activity and rest.   Encourage participation in cardiac rehabilitation services.    Notes:            Task Due Date Last Modified     Maintain Strength and Functional Ability --  5/1/2024 11:21 AM by Marta Young RN     Care Management Activities:      - not discussed during this outreach      Notes:              Problem Priority Last Modified     Obstructive Sleep Apnea (Heart Failure) --  5/1/2024 11:21 AM by Marta Young RN              Goal Recent Progress Last Modified     Effective Breathing Pattern During Sleep --  5/1/2024 11:21 AM by Marta Young RN     Evidence-based guidance:   Use a validated screening tool to identify risk for obstructive sleep apnea (BRIANNA).   Encourage a nonsupine sleep position, such as side-lying, head of bed elevated, multiple pillows, to prevent airway collapse.   Encourage the use of sleep hygiene techniques.   Anticipate a referral for sleep study (polysomnography).   If BRIANNA is identified, prepare patient for treatment options, such as nighttime oxygen therapy and CPAP (continuous positive airway pressure) or BiPAP (bilevel positive airway pressure).    Notes:            Task Due Date Last Modified     Monitor and Manage Obstructive Sleep Apnea (BRIANNA) --  5/1/2024 11:21 AM by Marta Young RN     Care Management Activities:      - not discussed during this outreach      Notes:                        Current Specialty Plan of Care Status signed by both patient and provider    Instructions   Patient was provided an electronic copy of care plan  CCM services were explained and offered and patient has accepted these services.  Patient has given their written consent to receive CCM services and understands that this includes the authorization of electronic communication of medical information with the other treating providers.  Patient  understands that they may stop CCM services at any time and these changes will be effective at the end of the calendar month and will effectively revocate the agreement of CCM services.  Patient understands that only one practitioner can furnish and be paid for CCM services during one calendar month.  Patient also understands that there may be co-payment or deductible fees in association with CCM services.  Patient will continue with at least monthly follow-up calls with the Ambulatory .    Marta CHOI  Ambulatory Case Management    5/31/2024, 13:51 EDT

## 2024-05-31 NOTE — TELEPHONE ENCOUNTER
Caller: NEELAM GOLDBERG     Relationship: [unfilled]     Best call back number: [phone]  [phone]    What is your medical concern? CALLED TO REQUEST THE PT CLINICAL NOTES FOR FREESTYLE FRANCISCA.  FAX:[phone]

## 2024-06-04 ENCOUNTER — TELEPHONE (OUTPATIENT)
Dept: FAMILY MEDICINE CLINIC | Facility: CLINIC | Age: 69
End: 2024-06-04
Payer: MEDICARE

## 2024-06-10 ENCOUNTER — TELEPHONE (OUTPATIENT)
Dept: FAMILY MEDICINE CLINIC | Facility: CLINIC | Age: 69
End: 2024-06-10
Payer: MEDICARE

## 2024-06-10 NOTE — TELEPHONE ENCOUNTER
Patients wife came in office requesting a script for a walker with 4 wheels, brakes and a seat that medicare will cover. She would also like an oxygen tank that attaches to the back of his wheelchair. She would like a call back to know if this will be done as soon as possible.

## 2024-06-10 NOTE — LETTER
June 11, 2024     Iker Barber  [address] [place] [address] [place] [address]    Patient: Iker COX Sunil   YOB: 1955   Date of Visit: 6/10/2024             ORDER - ROLLATER WALKER WITH SEAT AND BREAKS      DX- J44.9 R60.0 R06.2         Sincerely,        Betito Bowen MD

## 2024-06-25 RX ORDER — PRAVASTATIN SODIUM 40 MG
40 TABLET ORAL DAILY
Qty: 90 TABLET | Refills: 0 | Status: SHIPPED | OUTPATIENT
Start: 2024-06-25

## 2024-06-26 RX ORDER — FUROSEMIDE 40 MG/1
40 TABLET ORAL DAILY
Qty: 90 TABLET | Refills: 1 | OUTPATIENT
Start: 2024-06-26

## 2024-06-29 DIAGNOSIS — I10 PRIMARY HYPERTENSION: ICD-10-CM

## 2024-07-01 RX ORDER — FUROSEMIDE 40 MG/1
40 TABLET ORAL DAILY
Qty: 90 TABLET | Refills: 1 | Status: CANCELLED | OUTPATIENT
Start: 2024-07-01

## 2024-07-01 RX ORDER — PRAVASTATIN SODIUM 40 MG
40 TABLET ORAL DAILY
Qty: 90 TABLET | Refills: 1 | Status: CANCELLED | OUTPATIENT
Start: 2024-07-01

## 2024-07-01 RX ORDER — LOSARTAN POTASSIUM 100 MG/1
100 TABLET ORAL DAILY
Qty: 90 TABLET | Refills: 1 | OUTPATIENT
Start: 2024-07-01

## 2024-07-01 RX ORDER — PRAVASTATIN SODIUM 40 MG
40 TABLET ORAL DAILY
Qty: 90 TABLET | Refills: 0 | OUTPATIENT
Start: 2024-07-01

## 2024-07-02 DIAGNOSIS — I10 PRIMARY HYPERTENSION: ICD-10-CM

## 2024-07-02 DIAGNOSIS — R60.0 BILATERAL LEG EDEMA: Primary | ICD-10-CM

## 2024-07-02 DIAGNOSIS — E11.65 TYPE 2 DIABETES MELLITUS WITH HYPERGLYCEMIA, WITHOUT LONG-TERM CURRENT USE OF INSULIN: ICD-10-CM

## 2024-07-02 RX ORDER — PRAVASTATIN SODIUM 40 MG
40 TABLET ORAL DAILY
Qty: 90 TABLET | Refills: 0 | Status: SHIPPED | OUTPATIENT
Start: 2024-07-02

## 2024-07-02 RX ORDER — POTASSIUM CHLORIDE 750 MG/1
10 CAPSULE, EXTENDED RELEASE ORAL 2 TIMES DAILY
COMMUNITY
End: 2024-07-02 | Stop reason: SDUPTHER

## 2024-07-02 RX ORDER — POTASSIUM CHLORIDE 750 MG/1
10 CAPSULE, EXTENDED RELEASE ORAL 2 TIMES DAILY
Qty: 180 CAPSULE | Refills: 1 | Status: SHIPPED | OUTPATIENT
Start: 2024-07-02

## 2024-07-02 RX ORDER — FUROSEMIDE 40 MG/1
40 TABLET ORAL DAILY
Qty: 90 TABLET | Refills: 1 | Status: SHIPPED | OUTPATIENT
Start: 2024-07-02

## 2024-07-02 NOTE — TELEPHONE ENCOUNTER
Rx Refill Note    Requested Prescriptions     Pending Prescriptions Disp Refills    potassium chloride (MICRO-K) 10 MEQ CR capsule 180 capsule 1     Sig: Take 1 capsule by mouth 2 (Two) Times a Day.    furosemide (LASIX) 40 MG tablet 90 tablet 1     Sig: Take 1 tablet by mouth Daily.    pravastatin (PRAVACHOL) 40 MG tablet 90 tablet 0     Sig: Take 1 tablet by mouth Daily.        Last office visit with prescribing clinician: 5/23/2024      Next office visit with prescribing clinician: 9/3/2024   Last labs:   Last refill: should be okay on the pravastatin   Pharmacy (be sure to add in Epic). correct

## 2024-07-02 NOTE — TELEPHONE ENCOUNTER
Caller: Iker Barber    Relationship: Self    Best call back number: [phone]    Which medication are you concerned about: POTASSIUM CHLORIDE 10MEQ    Who prescribed you this medication: DR PRICE    What are your concerns: PATIENT IS UNABLE TO HAVE PHARMACY SEND OVER REFILL REQUEST SINCE PATIENT HAD CHANGED PHARMACY. PATIENT STATES THIS MEDICATION GOES TOGETHER WITH HIS FUROSEMIDE. IT LOOKS LIKE IT WAS DISCONTINUED IN FEBRUARY BUT NOT SURE WHY. PATIENT IS OUT OF MEDICATION. PLEASE ADVISE

## 2024-07-15 ENCOUNTER — TELEPHONE (OUTPATIENT)
Dept: CASE MANAGEMENT | Facility: OTHER | Age: 69
End: 2024-07-15
Payer: MEDICARE

## 2024-07-24 ENCOUNTER — PATIENT OUTREACH (OUTPATIENT)
Dept: CASE MANAGEMENT | Facility: OTHER | Age: 69
End: 2024-07-24
Payer: MEDICARE

## 2024-07-24 DIAGNOSIS — I10 PRIMARY HYPERTENSION: ICD-10-CM

## 2024-07-24 DIAGNOSIS — J44.9 END STAGE COPD: ICD-10-CM

## 2024-07-24 DIAGNOSIS — E11.65 TYPE 2 DIABETES MELLITUS WITH HYPERGLYCEMIA, WITHOUT LONG-TERM CURRENT USE OF INSULIN: Primary | ICD-10-CM

## 2024-07-24 NOTE — OUTREACH NOTE
AMBULATORY CASE MANAGEMENT NOTE    Names and Relationships of Patient/Support Persons: Contact: Iker Barber; Relationship: Self -     CCM Interim Update    Spoke with mr. Barber for CCM update. His wife was also present for the call. He becomes very winded with exertion.  Even if he turns too fast he becomes SOB. He is using his oxygen and nebulizer. He states he can not go outside in this weather. He does not have an incentive spirometer. He does plan to get one to help with lung volume. He does know that his CHF can also affect his breathing.     He does have edema that comes and goes. He states that his diuretic does help. He does have times that it is hard to get his shoes on from the edema. He does prop his feet up during the day.     He is glad to have his zacarias. He states that his blood sugars are much better. His fasting blood sugars are 80 or lower in the morning. He states that after meals is his problem. He reports his blood sugar will go up to 289-300 after meals but comes back down. His average glucose yesterday during the night was 132. From 12 am to noon the day before his average was 142. At his next appointment will meet with patient to show him how to review more data in PDM. He states that he has given up all sugar. He is really hopeful that with next lab work his A1c will be lower.     Care Coordination    He is concerned about getting his next sensors. Call placed to [facility]. They sent him a 90 day supply 6/3/24. At that time he was sent 7 sensors. He should have enough to last him until beginning of September. [employer] will send him reminders to call and confirm his next order when it is time. Also gave them the number for [employer]. Patient given information about refills.     Education Documentation  No documentation found.        Marta CHOI  Ambulatory Case Management    7/24/2024, 14:17 EDT

## 2024-07-29 ENCOUNTER — PATIENT OUTREACH (OUTPATIENT)
Dept: CASE MANAGEMENT | Facility: OTHER | Age: 69
End: 2024-07-29
Payer: MEDICARE

## 2024-07-29 DIAGNOSIS — J44.9 END STAGE COPD: ICD-10-CM

## 2024-07-29 DIAGNOSIS — I10 PRIMARY HYPERTENSION: ICD-10-CM

## 2024-07-29 DIAGNOSIS — E11.65 TYPE 2 DIABETES MELLITUS WITH HYPERGLYCEMIA, WITHOUT LONG-TERM CURRENT USE OF INSULIN: Primary | ICD-10-CM

## 2024-07-29 NOTE — OUTREACH NOTE
[facility] End of Month Documentation    This Chronic Medical Management Care Plan for Iker Barber, 69 y.o. male, has been reviewed; monitored and managed and a new plan of care implemented for the month of July.  A cumulative time of 33  minutes was spent on this patient record this month, including chart review; phone call with patient; phone call with relative.    Regarding the patient's problems: has Type 2 diabetes mellitus with hyperglycemia, without long-term current use of insulin; Primary hypertension; End stage COPD; Medicare annual wellness visit, subsequent; Bilateral leg edema; Loss of weight; Hyperlipidemia, mixed; Dysuria; Candida infection, oral; Coronary artery disease of native artery with stable angina pectoris; Shortness of breath; and Kidney lesion, native, right on their problem list., the following items were addressed: medical records; medications and any changes can be found within the plan section of the note.  A detailed listing of time spent for chronic care management is tracked within each outreach encounter.  Current medications include:  has a current medication list which includes the following prescription(s): accu-chek softclix lancets, albuterol, albuterol sulfate hfa, aspirin, azithromycin, bd pen needle mansi 2nd gen, clobetasol, diltiazem cd, fluconazole, furosemide, glipizide, accu-chek guide, basaglar kwikpen, ipratropium-albuterol, losartan, nitroglycerin, nystatin, potassium chloride, pravastatin, prednisone, and symbicort. and the patient is reported to be patient is compliant with medication protocol,  Medications are reported to be non-effective in controlling symptoms and changes have been made to the medication protocol, A1C 9.2.  Regarding these diagnoses, referrals were made to the following provider(s):  n/a.  All notes on chart for PCP to review.    The patient was monitored remotely for blood pressure; weight; blood glucose; medications; activity level.    The  patient's physical needs include:  help taking medications as prescribed; physical healthcare; medication education.     The patient's mental support needs include:  needs met    The patient's cognitive support needs include:  needs met    The patient's psychosocial support needs include:  continued support    The patient's functional needs include: physical healthcare; DME, call to [facility] regarding refill of sensors    The patient's environmental needs include:  not applicable    Care Plan overall comments:  reviewed    Refer to previous outreach notes for more information on the areas listed above.    Monthly Billing Diagnoses  (E11.65) Type 2 diabetes mellitus with hyperglycemia, without long-term current use of insulin    (I10) Primary hypertension    (J44.9) End stage COPD    Medications   Medications have been reconciled    Care Plan progress this month:      Recently Modified Care Plans Updates made since 6/28/2024 12:00 AM       COPD (Adult)           Problem Priority Last Modified     Symptom Exacerbation (COPD) --  5/1/2024 11:20 AM by Marta Young RN              Goal Recent Progress Last Modified     Symptom Exacerbation Prevented or Minimized On track  7/24/2024  1:57 PM by Marta Young RN     Evidence-based guidance:   Monitor for signs of respiratory infection, including changes in sputum color, volume and thickness, as well as fever.   Encourage infection prevention strategies that may include prophylactic antibiotic therapy for patients with history of frequent exacerbations or antibiotic administration during exacerbation based on presentation, risk and benefit.   Encourage receipt of influenza and pneumococcal vaccine.   Prepare patient for use of home long-term oxygen therapy in presence of sever resting hypoxemia.   Prepare patients for laboratory studies or diagnostic exams, such as spirometry, pulse oximetry and arterial blood gas based on current symptoms, risk factors and  presentation.   Assess barriers and manage adherence, including inhaler technique and persistent trigger exposure; encourage adherence, even when symptoms are controlled or infrequent.   Assess and monitor for signs/symptoms of psychosocial concerns, such as shortness of breath-anxiety cycle or depression that may impact stability of symptoms.   Identify economic resources, sociocultural beliefs, social factors and health literacy that may interfere with adherence.   Promote lifestyle changes when needed, including regular physical activity based on tolerance, weight loss, healthy eating and stress management.   Consider referral to nurse or community health worker or home-visiting program for intensive support and education (disease-management program).   Increase frequency of follow-up following exacerbation or hospitalization; consider transition of care interventions, such as hospital visit, home visit, telephone follow-up, review of discharge summary and resource referrals.    Notes: Patient does not have incentive spirometer. He plans to purchase one. He becomes very short of breath with any exertion. He is using his o2 and nebulizer.                          Instructions   Patient was provided an electronic copy of care plan  CCM services were explained and offered and patient has accepted these services.  Patient has given their written consent to receive CCM services and understands that this includes the authorization of electronic communication of medical information with the other treating providers.  Patient understands that they may stop CCM services at any time and these changes will be effective at the end of the calendar month and will effectively revocate the agreement of CCM services.  Patient understands that only one practitioner can furnish and be paid for CCM services during one calendar month.  Patient also understands that there may be co-payment or deductible fees in association with CCM  services.  Patient will continue with at least monthly follow-up calls with the Ambulatory .    Marta CHOI  Ambulatory Case Management    7/29/2024, 11:37 EDT

## 2024-08-01 RX ORDER — PEN NEEDLE, DIABETIC 32GX 5/32"
1 NEEDLE, DISPOSABLE MISCELLANEOUS TAKE AS DIRECTED
Qty: 100 EACH | Refills: 2 | Status: SHIPPED | OUTPATIENT
Start: 2024-08-01

## 2024-08-01 NOTE — TELEPHONE ENCOUNTER
Rx Refill Note    Requested Prescriptions     Pending Prescriptions Disp Refills    Insulin Pen Needle (BD Pen Needle Lynne 2nd Gen) 32G X 4 MM misc [Pharmacy Med Name: B-D LYNNE 2ND GEN PEN NDL [id number]] 100 each 2     Sig: USE TO INJECT INSULIN AS DIRECTED        Last office visit with prescribing clinician: 5/23/2024      Next office visit with prescribing clinician: 9/3/2024   Last labs:   Last refill: needs   Pharmacy (be sure to add in Epic). correct

## 2024-08-07 ENCOUNTER — READMISSION MANAGEMENT (OUTPATIENT)
Dept: CALL CENTER | Facility: HOSPITAL | Age: 69
End: 2024-08-07
Payer: MEDICARE

## 2024-08-07 NOTE — OUTREACH NOTE
Prep Survey      Flowsheet Row Responses   [facility] facility patient discharged from? Non-[facility]   Is LACE score < 7 ? Non- Discharge   Eligibility [facility]   Date of Discharge 08/07/24   Discharge Disposition Home or Self Care   Discharge diagnosis STROKE ALERT   Does the patient have one of the following disease processes/diagnoses(primary or secondary)? Other   Prep survey completed? Yes            Georgette HERNANDEZ - Registered Nurse

## 2024-08-08 ENCOUNTER — TRANSITIONAL CARE MANAGEMENT TELEPHONE ENCOUNTER (OUTPATIENT)
Dept: CALL CENTER | Facility: HOSPITAL | Age: 69
End: 2024-08-08
Payer: MEDICARE

## 2024-08-08 NOTE — OUTREACH NOTE
Call Center TCM Note      Flowsheet Row Responses   [facility] patient discharged from? Non-  [[facility]]   Does the patient have one of the following disease processes/diagnoses(primary or secondary)? Other   TCM attempt successful? No   Unsuccessful attempts Attempt 1            Joyce Hagen RN    8/8/2024, 15:51 EDT

## 2024-08-08 NOTE — OUTREACH NOTE
Call Center TCM Note      Flowsheet Row Responses   [facility] patient discharged from? Non-  [[facility]]   Does the patient have one of the following disease processes/diagnoses(primary or secondary)? Other   TCM attempt successful? No   Unsuccessful attempts Attempt 2            Joyce Hagen RN    8/8/2024, 16:26 EDT

## 2024-08-09 ENCOUNTER — TRANSITIONAL CARE MANAGEMENT TELEPHONE ENCOUNTER (OUTPATIENT)
Dept: CALL CENTER | Facility: HOSPITAL | Age: 69
End: 2024-08-09
Payer: MEDICARE

## 2024-08-09 RX ORDER — PEN NEEDLE, DIABETIC 32GX 5/32"
1 NEEDLE, DISPOSABLE MISCELLANEOUS TAKE AS DIRECTED
Qty: 100 EACH | Refills: 2 | Status: CANCELLED | OUTPATIENT
Start: 2024-08-09

## 2024-08-09 RX ORDER — PEN NEEDLE, DIABETIC 32GX 5/32"
1 NEEDLE, DISPOSABLE MISCELLANEOUS TAKE AS DIRECTED
Qty: 100 EACH | Refills: 2 | OUTPATIENT
Start: 2024-08-09

## 2024-08-09 RX ORDER — BLOOD-GLUCOSE SENSOR
1 EACH MISCELLANEOUS
Qty: 1 EACH | Refills: 2 | Status: CANCELLED | OUTPATIENT
Start: 2024-08-09

## 2024-08-09 RX ORDER — BLOOD-GLUCOSE SENSOR
1 EACH MISCELLANEOUS
Qty: 2 EACH | Refills: 0 | Status: SHIPPED | OUTPATIENT
Start: 2024-08-09

## 2024-08-09 NOTE — OUTREACH NOTE
Call Center TCM Note      Flowsheet Row Responses   [facility] patient discharged from? Non-  [[facility]]   Does the patient have one of the following disease processes/diagnoses(primary or secondary)? Other   TCM attempt successful? Yes   Call start time 0834   Call end time 0851   Discharge diagnosis Hypercarbic respiratory failure, COPD exacerbation   Person spoke with today (if not patient) and relationship Patient   Meds reviewed with patient/caregiver? Yes  [New: prednisone at discharge.]   Does the patient have all medications ordered at discharge? Yes   Prescription comments Patient reports that he needs the insulin pen needles RX called in and also he needs new Freestyle Bela 3 sensors. Message routed to PCP   Is the patient taking all medications as directed (includes completed medication regime)? Yes   Medication comments Patient reports that he also needs Deneen to review his med list and order refills on his medlist. He reports that he has several that are going to need new RX soon.   Comments PCP Dr Bowen. Hospital follow up appt scheduled for 8/19  11am with PCP. Patient has a Physical also on 9/3, He would like to know from PCP what lab work needs to be obtained now that he has had so many labs done wtih the hospital visit. Message routed to office.   Does the patient have an appointment with their PCP within 7-14 days of discharge? No   Nursing Interventions Assisted patient with making appointment per protocol, Routed TCM call to PCP office   What is the Home health agency?  Patient reports that he is going to have HH PT   Has home health visited the patient within 72 hours of discharge? Call prior to 72 hours   DME comments Patient reports that he has all needed home equipment. He also monitors O2 sat with [employer].   Psychosocial issues? No   Did the patient receive a copy of their discharge instructions? Yes   What is the patient's perception of their health status since discharge?  Improving   Is the patient/caregiver able to teach back signs and symptoms related to disease process for when to call PCP? Yes   Is the patient/caregiver able to teach back signs and symptoms related to disease process for when to call 911? Yes   Is the patient/caregiver able to teach back the hierarchy of who to call/visit for symptoms/problems? PCP, Specialist, Home health nurse, Urgent Care, ED, 911 Yes   If the patient is a current smoker, are they able to teach back resources for cessation? Not a smoker   TCM call completed? Yes   Call end time 0851   Would this patient benefit from a Referral to Amb Social Work? No   Is the patient interested in additional calls from an ambulatory ? No            Reshma Robles RN    8/9/2024, 08:58 EDT

## 2024-08-09 NOTE — TELEPHONE ENCOUNTER
Rx Refill Note    Requested Prescriptions     Pending Prescriptions Disp Refills    Insulin Pen Needle (BD Pen Needle Sumi 2nd Gen) 32G X 4 MM misc 100 each 2     Si each by Other route Take As Directed.    Continuous Glucose Sensor (FreeStyle Bela 3 Plus Sensor) misc 1 each 2     Sig: Use 1 each.        Last office visit with prescribing clinician: 2024      Next office visit with prescribing clinician: 2024   Last labs:   Last refill: Dr. Bowen patient   Pharmacy (be sure to add in Epic). correct

## 2024-08-19 ENCOUNTER — TELEPHONE (OUTPATIENT)
Dept: FAMILY MEDICINE CLINIC | Facility: CLINIC | Age: 69
End: 2024-08-19

## 2024-08-19 ENCOUNTER — OFFICE VISIT (OUTPATIENT)
Dept: FAMILY MEDICINE CLINIC | Facility: CLINIC | Age: 69
End: 2024-08-19
Payer: MEDICARE

## 2024-08-19 VITALS
DIASTOLIC BLOOD PRESSURE: 52 MMHG | WEIGHT: 145.5 LBS | BODY MASS INDEX: 21.55 KG/M2 | HEART RATE: 75 BPM | OXYGEN SATURATION: 93 % | RESPIRATION RATE: 18 BRPM | HEIGHT: 69 IN | SYSTOLIC BLOOD PRESSURE: 100 MMHG

## 2024-08-19 DIAGNOSIS — R60.0 BILATERAL LEG EDEMA: ICD-10-CM

## 2024-08-19 DIAGNOSIS — I10 PRIMARY HYPERTENSION: ICD-10-CM

## 2024-08-19 DIAGNOSIS — J44.9 END STAGE COPD: ICD-10-CM

## 2024-08-19 DIAGNOSIS — I25.118 CORONARY ARTERY DISEASE OF NATIVE ARTERY OF NATIVE HEART WITH STABLE ANGINA PECTORIS: Primary | ICD-10-CM

## 2024-08-19 DIAGNOSIS — E11.65 TYPE 2 DIABETES MELLITUS WITH HYPERGLYCEMIA, WITHOUT LONG-TERM CURRENT USE OF INSULIN: ICD-10-CM

## 2024-08-19 LAB
EXPIRATION DATE: ABNORMAL
Lab: ABNORMAL
POC CREATININE URINE: 0
POC MICROALBUMIN URINE: 20

## 2024-08-19 RX ORDER — INSULIN GLARGINE 100 [IU]/ML
10 INJECTION, SOLUTION SUBCUTANEOUS NIGHTLY
Qty: 15 ML | Refills: 5 | Status: SHIPPED | OUTPATIENT
Start: 2024-08-19

## 2024-08-19 RX ORDER — ALBUTEROL SULFATE 90 UG/1
2 AEROSOL, METERED RESPIRATORY (INHALATION) EVERY 4 HOURS PRN
Qty: 8 G | Refills: 10 | Status: SHIPPED | OUTPATIENT
Start: 2024-08-19

## 2024-08-19 RX ORDER — PEN NEEDLE, DIABETIC 32GX 5/32"
1 NEEDLE, DISPOSABLE MISCELLANEOUS TAKE AS DIRECTED
Qty: 100 EACH | Refills: 2 | Status: SHIPPED | OUTPATIENT
Start: 2024-08-19

## 2024-08-19 RX ORDER — BUDESONIDE AND FORMOTEROL FUMARATE DIHYDRATE 160; 4.5 UG/1; UG/1
2 AEROSOL RESPIRATORY (INHALATION)
Qty: 3 EACH | Refills: 5 | Status: SHIPPED | OUTPATIENT
Start: 2024-08-19

## 2024-08-19 RX ORDER — BLOOD-GLUCOSE SENSOR
1 EACH MISCELLANEOUS
Qty: 2 EACH | Refills: 0 | Status: SHIPPED | OUTPATIENT
Start: 2024-08-19

## 2024-08-19 RX ORDER — PRAVASTATIN SODIUM 40 MG
40 TABLET ORAL DAILY
Qty: 90 TABLET | Refills: 0 | Status: SHIPPED | OUTPATIENT
Start: 2024-08-19

## 2024-08-19 RX ORDER — PREDNISONE 10 MG/1
10 TABLET ORAL DAILY
Qty: 30 TABLET | Refills: 0 | Status: SHIPPED | OUTPATIENT
Start: 2024-08-19

## 2024-08-19 RX ORDER — FLUCONAZOLE 150 MG/1
150 TABLET ORAL DAILY
Qty: 10 TABLET | Refills: 5 | Status: SHIPPED | OUTPATIENT
Start: 2024-08-19

## 2024-08-19 RX ORDER — DILTIAZEM HYDROCHLORIDE 300 MG/1
300 CAPSULE, COATED, EXTENDED RELEASE ORAL DAILY
Qty: 90 CAPSULE | Refills: 2 | Status: SHIPPED | OUTPATIENT
Start: 2024-08-19

## 2024-08-19 RX ORDER — ALBUTEROL SULFATE 2.5 MG/3ML
2.5 SOLUTION RESPIRATORY (INHALATION) EVERY 4 HOURS PRN
Qty: 540 EACH | Refills: 1 | Status: SHIPPED | OUTPATIENT
Start: 2024-08-19

## 2024-08-19 RX ORDER — CLOBETASOL PROPIONATE 0.5 MG/ML
SOLUTION TOPICAL 2 TIMES DAILY
Qty: 50 ML | Refills: 3 | Status: SHIPPED | OUTPATIENT
Start: 2024-08-19

## 2024-08-19 RX ORDER — FUROSEMIDE 40 MG/1
40 TABLET ORAL DAILY
Qty: 90 TABLET | Refills: 1 | Status: SHIPPED | OUTPATIENT
Start: 2024-08-19

## 2024-08-19 RX ORDER — NITROGLYCERIN 0.4 MG/1
0.4 TABLET SUBLINGUAL
Qty: 30 TABLET | Refills: 5 | Status: SHIPPED | OUTPATIENT
Start: 2024-08-19

## 2024-08-19 RX ORDER — LOSARTAN POTASSIUM 100 MG/1
100 TABLET ORAL DAILY
Qty: 90 TABLET | Refills: 1 | Status: SHIPPED | OUTPATIENT
Start: 2024-08-19

## 2024-08-19 RX ORDER — AZITHROMYCIN 250 MG/1
250 TABLET, FILM COATED ORAL EVERY OTHER DAY
Qty: 15 TABLET | Refills: 5 | Status: SHIPPED | OUTPATIENT
Start: 2024-08-19

## 2024-08-19 RX ORDER — POTASSIUM CHLORIDE 750 MG/1
10 CAPSULE, EXTENDED RELEASE ORAL 2 TIMES DAILY
Qty: 180 CAPSULE | Refills: 1 | Status: SHIPPED | OUTPATIENT
Start: 2024-08-19

## 2024-08-19 RX ORDER — IPRATROPIUM BROMIDE AND ALBUTEROL SULFATE 2.5; .5 MG/3ML; MG/3ML
3 SOLUTION RESPIRATORY (INHALATION)
Qty: 360 ML | Refills: 3 | Status: SHIPPED | OUTPATIENT
Start: 2024-08-19

## 2024-08-19 NOTE — PROGRESS NOTES
Patient Name: Iker Barber  : 1955   MRN: [id number]     Chief Complaint:    Chief Complaint   Patient presents with    [place]     [facility] [place] of Present Illness: Iker Barber is a 69 y.o. male who is here today for follow up from hospital stay for COPD  HPI        Review of Systems:   Review of Systems   Constitutional: Negative.    HENT: Negative.     Eyes: Negative.    Respiratory:  Positive for cough and shortness of breath.    Cardiovascular: Negative.    Gastrointestinal: Negative.    Neurological: Negative.         Past Medical History:   Past Medical History:   Diagnosis Date    Anxiety     Arthritis     Asthma     Diabetes     Elevated blood pressure reading     Emphysema, unspecified     Esophageal reflux     Hearing problem     Heart disease     Hypercholesterolemia     Irritable bowel syndrome (IBS)     Peripheral artery disease     Skin disorder     Stroke (cerebrum)        Past Surgical History: No past surgical history on file.    Family History:   Family History   Problem Relation Age of Onset    Allergies Mother     Alzheimer's disease Mother     Diabetes Mother     Diabetes Father     Diabetes Brother        Social History:   Social History     Socioeconomic History    Marital status:    Tobacco Use    Smoking status: Former     Current packs/day: 0.00     Average packs/day: 1 pack/day for 40.0 years (40.0 ttl pk-yrs)     Types: Cigarettes     Start date:      Quit date:      Years since quittin.6     Passive exposure: Past    Smokeless tobacco: Never   Vaping Use    Vaping status: Never Used   Substance and Sexual Activity    Alcohol use: Not Currently    Drug use: Never    Sexual activity: Not Currently     Partners: Female       Medications:     Current Outpatient Medications:     albuterol (PROVENTIL) (2.5 MG/3ML) 0.083% nebulizer solution, Take 2.5 mg by nebulization Every 4 (Four) Hours As Needed for Shortness of  Air., Disp: 540 each, Rfl: 1    albuterol sulfate  (90 Base) MCG/ACT inhaler, Inhale 2 puffs Every 4 (Four) Hours As Needed for Wheezing., Disp: 8 g, Rfl: 10    aspirin 81 MG EC tablet, Take 1 tablet by mouth Daily., Disp: 90 tablet, Rfl: 3    azithromycin (ZITHROMAX) 250 MG tablet, Take 1 tablet by mouth Every Other Day. Indications: COPD Exacerbation Suppression, Disp: 15 tablet, Rfl: 5    clobetasol (TEMOVATE) 0.05 % external solution, Apply  topically to the appropriate area as directed 2 (Two) Times a Day., Disp: 50 mL, Rfl: 3    Continuous Glucose Sensor (FreeStyle Bela 3 Plus Sensor) misc, Use 1 each Every 14 (Fourteen) Days., Disp: 2 each, Rfl: 0    dilTIAZem CD (Cardizem CD) 300 MG 24 hr capsule, Take 1 capsule by mouth Daily., Disp: 90 capsule, Rfl: 2    fluconazole (DIFLUCAN) 150 MG tablet, Take 1 tablet by mouth Daily., Disp: 10 tablet, Rfl: 5    furosemide (LASIX) 40 MG tablet, Take 1 tablet by mouth Daily., Disp: 90 tablet, Rfl: 1    Insulin Glargine (BASAGLAR KWIKPEN) 100 UNIT/ML injection pen, Inject 10 Units under the skin into the appropriate area as directed Every Night., Disp: 15 mL, Rfl: 5    Insulin Pen Needle (BD Pen Needle Sumi 2nd Gen) 32G X 4 MM misc, 1 each by Other route Take As Directed., Disp: 100 each, Rfl: 2    ipratropium-albuterol (DUO-NEB) 0.5-2.5 mg/3 ml nebulizer, Take 3 mL by nebulization 4 (Four) Times a Day., Disp: 360 mL, Rfl: 3    losartan (COZAAR) 100 MG tablet, Take 1 tablet by mouth Daily., Disp: 90 tablet, Rfl: 1    nitroglycerin (NITROSTAT) 0.4 MG SL tablet, Place 1 tablet under the tongue Every 5 (Five) Minutes As Needed for Chest Pain., Disp: 30 tablet, Rfl: 5    nystatin (MYCOSTATIN) 100,000 unit/mL suspension, Swish and swallow 5 mL 4 (Four) Times a Day., Disp: 473 mL, Rfl: 5    potassium chloride (MICRO-K) 10 MEQ CR capsule, Take 1 capsule by mouth 2 (Two) Times a Day., Disp: 180 capsule, Rfl: 1    pravastatin (PRAVACHOL) 40 MG tablet, Take 1 tablet by  "mouth Daily., Disp: 90 tablet, Rfl: 0    predniSONE (DELTASONE) 10 MG tablet, Take 1 tablet by mouth Daily. 5 po once a day for 2 days, 4 po once a day for 2 days, 3 po once a day for 2 days, 2 po once a day for 2 days, 1 po once a day for 2 days, Disp: 30 tablet, Rfl: 0    Symbicort 160-4.5 MCG/ACT inhaler, Inhale 2 puffs 2 (Two) Times a Day., Disp: 3 each, Rfl: 5    Accu-Chek Softclix Lancets lancets, by Other route. Use as instructed (Patient not taking: Reported on 8/19/2024), Disp: , Rfl:     glipizide (Glucotrol) 5 MG tablet, Take 1 tablet by mouth 2 (Two) Times a Day Before Meals. (Patient not taking: Reported on 8/19/2024), Disp: 180 tablet, Rfl: 1    glucose blood (Accu-Chek Guide) test strip, 1 each by Other route Daily. Use as instructed (Patient not taking: Reported on 8/19/2024), Disp: 100 each, Rfl: 2    Allergies:   Allergies   Allergen Reactions    Methylprednisolone Confusion         Physical Exam:  Vital Signs:   Vitals:    08/19/24 1055   BP: 100/52   BP Location: Left arm   Patient Position: Sitting   Cuff Size: Adult   Pulse: 75   Resp: 18   SpO2: 93%   Weight: 66 kg (145 lb 8 oz)   Height: 175.5 cm (69.08\")     Body mass index is 21.44 kg/m².     Physical Exam  Vitals and nursing note reviewed.   Constitutional:       Appearance: Normal appearance. He is normal weight.   HENT:      Head: Normocephalic and atraumatic.      Right Ear: Tympanic membrane, ear canal and external ear normal.      Left Ear: Tympanic membrane, ear canal and external ear normal.      Nose: Nose normal.      Mouth/Throat:      Mouth: Mucous membranes are dry.      Pharynx: Oropharynx is clear.   Eyes:      Extraocular Movements: Extraocular movements intact.      Conjunctiva/sclera: Conjunctivae normal.      Pupils: Pupils are equal, round, and reactive to light.   Cardiovascular:      Rate and Rhythm: Normal rate and regular rhythm.      Pulses: Normal pulses.      Heart sounds: Normal heart sounds.   Pulmonary:      " Effort: Pulmonary effort is normal.      Breath sounds: Normal breath sounds.   Musculoskeletal:      Cervical back: Normal range of motion and neck supple.   Feet:      Comments:      Neurological:      Mental Status: He is alert.           ECG 12 Lead    Date/Time: 8/19/2024 11:57 AM  Performed by: Betito Bowen MD    Authorized by: Betito Bowen MD  Comparison: compared with previous ECG from 4/8/2024  Comparison to previous ECG: Pt was in junctional tachycrdia but now is in sinus rhythm  Rhythm: sinus rhythm  Rate: normal  Conduction: conduction normal  ST Segments: ST segments normal  T Waves: T waves normal  QRS axis: normal    Clinical impression: non-specific ECG  Comments: Pt has unifocal PVC's            Assessment/Plan:   Diagnoses and all orders for this visit:    1. Coronary artery disease of native artery of native heart with stable angina pectoris (Primary)  Assessment & Plan:  Patient had positive troponins back in April.  He had discussed this with Dr. Her rocha and it was not fruitful.  He wants a different cardiologist.  With his positive troponins and a history of coronary artery disease I am going to have him see Dr. Myers.  Return to clinic in 2 months for recheck.  EKG does not show any change.  Told him if he has chest pain or increased shortness of breath go to emergency room.    Orders:  -     Comprehensive Metabolic Panel; Future  -     Hemoglobin A1c; Future  -     Lipid Panel; Future  -     CBC & Differential; Future  -     Vitamin B12; Future  -     TSH Rfx On Abnormal To Free T4; Future    2. End stage COPD  Assessment & Plan:  Patient has been seeing Dr. Monroe.  He wants to see another pulmonologist.  I will make the referral.  Reviewed hospital stay with patient.  Answered all his questions.    Orders:  -     albuterol (PROVENTIL) (2.5 MG/3ML) 0.083% nebulizer solution; Take 2.5 mg by nebulization Every 4 (Four) Hours As Needed for Shortness of Air.  Dispense: 540 each;  Refill: 1  -     Ambulatory Referral to Pulmonology  -     Comprehensive Metabolic Panel; Future  -     Hemoglobin A1c; Future  -     Lipid Panel; Future  -     CBC & Differential; Future  -     Vitamin B12; Future  -     TSH Rfx On Abnormal To Free T4; Future    3. Type 2 diabetes mellitus with hyperglycemia, without long-term current use of insulin  Assessment & Plan:  Patient is going to stop his glipizide.  He has been having some wide swings in his blood sugar values.  Will keep him on his Basaglar 10 units twice a day.  Recheck in 2 months.    Orders:  -     Insulin Glargine (BASAGLAR KWIKPEN) 100 UNIT/ML injection pen; Inject 10 Units under the skin into the appropriate area as directed Every Night.  Dispense: 15 mL; Refill: 5  -     Comprehensive Metabolic Panel; Future  -     Hemoglobin A1c; Future  -     Lipid Panel; Future  -     CBC & Differential; Future  -     Vitamin B12; Future  -     TSH Rfx On Abnormal To Free T4; Future  -     POCT microalbumin    4. Primary hypertension  Assessment & Plan:  Discussed with patient to monitor their blood pressure and if systolic blood pressure goes above 140 or diastolic is above 90 to return to clinic.  Take medicines as directed, call for any problems, patient not having or any worrisome symptoms.        Orders:  -     losartan (COZAAR) 100 MG tablet; Take 1 tablet by mouth Daily.  Dispense: 90 tablet; Refill: 1  -     Comprehensive Metabolic Panel; Future  -     Hemoglobin A1c; Future  -     Lipid Panel; Future  -     CBC & Differential; Future  -     Vitamin B12; Future  -     TSH Rfx On Abnormal To Free T4; Future    5. Bilateral leg edema  Assessment & Plan:  Patient has bilateral lower extremity edema.  You would think it would be due to either increased pulmonary pressures, or symptoms of congestive heart failure.  His last echo was okay.  I am going to have him see Dr. Myers and if he does not order an echocardiogram I will repeat 1.    Orders:  -      Comprehensive Metabolic Panel; Future  -     Hemoglobin A1c; Future  -     Lipid Panel; Future  -     CBC & Differential; Future  -     Vitamin B12; Future  -     TSH Rfx On Abnormal To Free T4; Future    Other orders  -     albuterol sulfate  (90 Base) MCG/ACT inhaler; Inhale 2 puffs Every 4 (Four) Hours As Needed for Wheezing.  Dispense: 8 g; Refill: 10  -     azithromycin (ZITHROMAX) 250 MG tablet; Take 1 tablet by mouth Every Other Day. Indications: COPD Exacerbation Suppression  Dispense: 15 tablet; Refill: 5  -     Insulin Pen Needle (BD Pen Needle Sumi 2nd Gen) 32G X 4 MM misc; 1 each by Other route Take As Directed.  Dispense: 100 each; Refill: 2  -     clobetasol (TEMOVATE) 0.05 % external solution; Apply  topically to the appropriate area as directed 2 (Two) Times a Day.  Dispense: 50 mL; Refill: 3  -     Continuous Glucose Sensor (FreeStyle Bela 3 Plus Sensor) misc; Use 1 each Every 14 (Fourteen) Days.  Dispense: 2 each; Refill: 0  -     dilTIAZem CD (Cardizem CD) 300 MG 24 hr capsule; Take 1 capsule by mouth Daily.  Dispense: 90 capsule; Refill: 2  -     fluconazole (DIFLUCAN) 150 MG tablet; Take 1 tablet by mouth Daily.  Dispense: 10 tablet; Refill: 5  -     furosemide (LASIX) 40 MG tablet; Take 1 tablet by mouth Daily.  Dispense: 90 tablet; Refill: 1  -     ipratropium-albuterol (DUO-NEB) 0.5-2.5 mg/3 ml nebulizer; Take 3 mL by nebulization 4 (Four) Times a Day.  Dispense: 360 mL; Refill: 3  -     nitroglycerin (NITROSTAT) 0.4 MG SL tablet; Place 1 tablet under the tongue Every 5 (Five) Minutes As Needed for Chest Pain.  Dispense: 30 tablet; Refill: 5  -     nystatin (MYCOSTATIN) 100,000 unit/mL suspension; Swish and swallow 5 mL 4 (Four) Times a Day.  Dispense: 473 mL; Refill: 5  -     potassium chloride (MICRO-K) 10 MEQ CR capsule; Take 1 capsule by mouth 2 (Two) Times a Day.  Dispense: 180 capsule; Refill: 1  -     pravastatin (PRAVACHOL) 40 MG tablet; Take 1 tablet by mouth Daily.   Dispense: 90 tablet; Refill: 0  -     predniSONE (DELTASONE) 10 MG tablet; Take 1 tablet by mouth Daily. 5 po once a day for 2 days, 4 po once a day for 2 days, 3 po once a day for 2 days, 2 po once a day for 2 days, 1 po once a day for 2 days  Dispense: 30 tablet; Refill: 0  -     Symbicort 160-4.5 MCG/ACT inhaler; Inhale 2 puffs 2 (Two) Times a Day.  Dispense: 3 each; Refill: 5  -     ECG 12 Lead             Follow Up:   Return in about 2 months (around 10/19/2024) for Annual physical, Bloodwork 1 week prior to next appointment.      Betito Bowen MD  [facility]

## 2024-08-19 NOTE — ASSESSMENT & PLAN NOTE
Patient has bilateral lower extremity edema.  You would think it would be due to either increased pulmonary pressures, or symptoms of congestive heart failure.  His last echo was okay.  I am going to have him see Dr. Myers and if he does not order an echocardiogram I will repeat 1.

## 2024-08-19 NOTE — LETTER
August 19, 2024    Iker eMndozaorato  [address] [facility] [address]        Freestyle Bela 3 Sensor    (# 3 with 2 refills)    SIG: replace every 10 days as directed    DX: E11.65                                Betito Bowen MD

## 2024-08-19 NOTE — TELEPHONE ENCOUNTER
Called Sara at [phone] ex [phone] for faiza and left a message for her to call office back. We are needing to know what we need to do in order for patient to get his freestyle zacarias 3 sensors.

## 2024-08-19 NOTE — ASSESSMENT & PLAN NOTE
Patient had positive troponins back in April.  He had discussed this with Dr. Her rocha and it was not fruitful.  He wants a different cardiologist.  With his positive troponins and a history of coronary artery disease I am going to have him see Dr. Myers.  Return to clinic in 2 months for recheck.  EKG does not show any change.  Told him if he has chest pain or increased shortness of breath go to emergency room.

## 2024-08-19 NOTE — ASSESSMENT & PLAN NOTE
Patient is going to stop his glipizide.  He has been having some wide swings in his blood sugar values.  Will keep him on his Basaglar 10 units twice a day.  Recheck in 2 months.

## 2024-08-19 NOTE — ASSESSMENT & PLAN NOTE
Patient has been seeing Dr. Monroe.  He wants to see another pulmonologist.  I will make the referral.  Reviewed hospital stay with patient.  Answered all his questions.

## 2024-08-26 ENCOUNTER — OFFICE VISIT (OUTPATIENT)
Dept: CARDIOLOGY | Facility: CLINIC | Age: 69
End: 2024-08-26
Payer: MEDICARE

## 2024-08-26 ENCOUNTER — TELEPHONE (OUTPATIENT)
Dept: FAMILY MEDICINE CLINIC | Facility: CLINIC | Age: 69
End: 2024-08-26
Payer: MEDICARE

## 2024-08-26 VITALS
BODY MASS INDEX: 21.86 KG/M2 | SYSTOLIC BLOOD PRESSURE: 122 MMHG | HEIGHT: 69 IN | DIASTOLIC BLOOD PRESSURE: 70 MMHG | HEART RATE: 61 BPM | OXYGEN SATURATION: 96 % | WEIGHT: 147.6 LBS

## 2024-08-26 DIAGNOSIS — E78.2 HYPERLIPIDEMIA, MIXED: ICD-10-CM

## 2024-08-26 DIAGNOSIS — I25.118 CORONARY ARTERY DISEASE OF NATIVE ARTERY OF NATIVE HEART WITH STABLE ANGINA PECTORIS: Primary | ICD-10-CM

## 2024-08-26 DIAGNOSIS — R07.9 CHEST PAIN, UNSPECIFIED TYPE: ICD-10-CM

## 2024-08-26 DIAGNOSIS — I10 PRIMARY HYPERTENSION: ICD-10-CM

## 2024-08-26 PROCEDURE — 99214 OFFICE O/P EST MOD 30 MIN: CPT | Performed by: INTERNAL MEDICINE

## 2024-08-26 PROCEDURE — 3074F SYST BP LT 130 MM HG: CPT | Performed by: INTERNAL MEDICINE

## 2024-08-26 PROCEDURE — 3078F DIAST BP <80 MM HG: CPT | Performed by: INTERNAL MEDICINE

## 2024-08-26 RX ORDER — NITROGLYCERIN 0.4 MG/1
0.4 TABLET SUBLINGUAL
OUTPATIENT
Start: 2024-08-26 | End: 2024-08-26

## 2024-08-26 RX ORDER — METOPROLOL TARTRATE 25 MG/1
200 TABLET, FILM COATED ORAL ONCE
OUTPATIENT
Start: 2024-08-26 | End: 2024-08-26

## 2024-08-26 RX ORDER — METOPROLOL TARTRATE 1 MG/ML
5 INJECTION, SOLUTION INTRAVENOUS
OUTPATIENT
Start: 2024-08-26

## 2024-08-26 RX ORDER — SODIUM CHLORIDE 9 MG/ML
40 INJECTION, SOLUTION INTRAVENOUS AS NEEDED
OUTPATIENT
Start: 2024-08-26

## 2024-08-26 RX ORDER — METOPROLOL TARTRATE 25 MG/1
25 TABLET, FILM COATED ORAL ONCE
OUTPATIENT
Start: 2024-08-26

## 2024-08-26 RX ORDER — IVABRADINE 5 MG/1
15 TABLET, FILM COATED ORAL ONCE
OUTPATIENT
Start: 2024-08-26 | End: 2024-08-26

## 2024-08-26 RX ORDER — METOPROLOL TARTRATE 50 MG
50 TABLET ORAL
OUTPATIENT
Start: 2024-08-26

## 2024-08-26 RX ORDER — NITROGLYCERIN 0.4 MG/1
0.8 TABLET SUBLINGUAL
OUTPATIENT
Start: 2024-08-26

## 2024-08-26 RX ORDER — SODIUM CHLORIDE 0.9 % (FLUSH) 0.9 %
10 SYRINGE (ML) INJECTION AS NEEDED
OUTPATIENT
Start: 2024-08-26

## 2024-08-26 RX ORDER — METOPROLOL TARTRATE 25 MG/1
100 TABLET, FILM COATED ORAL ONCE
OUTPATIENT
Start: 2024-08-26

## 2024-08-26 RX ORDER — SODIUM CHLORIDE 0.9 % (FLUSH) 0.9 %
10 SYRINGE (ML) INJECTION EVERY 12 HOURS SCHEDULED
OUTPATIENT
Start: 2024-08-26

## 2024-08-26 RX ORDER — METOPROLOL TARTRATE 25 MG/1
50 TABLET, FILM COATED ORAL ONCE
OUTPATIENT
Start: 2024-08-26

## 2024-08-26 RX ORDER — METOPROLOL TARTRATE 25 MG/1
150 TABLET, FILM COATED ORAL ONCE
OUTPATIENT
Start: 2024-08-26

## 2024-08-26 NOTE — PROGRESS NOTES
OFFICE VISIT  NOTE  [facility] CARDIOLOGY      Name: Iker Barber    Date: 2024  MRN:  [id number]  :  1955      REFERRING/PRIMARY PROVIDER:  Betito Bowen MD    Chief Complaint   Patient presents with    Bilateral leg edema       HPI: Iker Barber is a 69 y.o. male who presents for chest pain.  History of severe oxygen dependent COPD, hypertension, hyperlipidemia and lower extremity edema.  Evaluated by Dr. Starr 2024, recommended cardiac catheterization.  Troponin mildly elevated at 39 at that time.  Echo 4/3/2024 showed EF 69%, grade 1 diastolic dysfunction, normal RVSP, no significant valve disease.  Recent [facility] ICU stay for severe hypoxic respiratory failure COPD exacerbation early 2024.  He reports stable shortness of breath recently he is on 4 L nasal cannula has a 50-pack-year smoking history quit in .  Gets left-sided chest pain rating to the left arm at rest or with exertion.    Past Medical History:   Diagnosis Date    Anxiety     Arthritis     Asthma     Diabetes     Elevated blood pressure reading     Emphysema, unspecified     Esophageal reflux     Hearing problem     Heart disease     Hypercholesterolemia     Irritable bowel syndrome (IBS)     Peripheral artery disease     Skin disorder     Stroke (cerebrum)        History reviewed. No pertinent surgical history.    Social History     Socioeconomic History    Marital status:    Tobacco Use    Smoking status: Former     Current packs/day: 0.00     Average packs/day: 1 pack/day for 40.0 years (40.0 ttl pk-yrs)     Types: Cigarettes     Start date:      Quit date: 2013     Years since quittin.6     Passive exposure: Past    Smokeless tobacco: Never   Vaping Use    Vaping status: Never Used   Substance and Sexual Activity    Alcohol use: Not Currently    Drug use: Never    Sexual activity: Not Currently     Partners: Female       Family History   Problem Relation Age of Onset     Allergies Mother     Alzheimer's disease Mother     Diabetes Mother     Diabetes Father     Diabetes Brother         ROS:   Constitutional no fever,  no weight loss   Skin no rash, no subcutaneous nodules   Otolaryngeal no difficulty swallowing   Cardiovascular See HPI   Pulmonary no cough, no sputum production   Gastrointestinal no constipation, no diarrhea   Genitourinary no dysuria, no hematuria   Hematologic no easy bruisability, no abnormal bleeding   Musculoskeletal no muscle pain   Neurologic no dizziness, no falls         Allergies   Allergen Reactions    Methylprednisolone Confusion         Current Outpatient Medications:     Accu-Chek Softclix Lancets lancets, by Other route. Use as instructed, Disp: , Rfl:     albuterol (PROVENTIL) (2.5 MG/3ML) 0.083% nebulizer solution, Take 2.5 mg by nebulization Every 4 (Four) Hours As Needed for Shortness of Air., Disp: 540 each, Rfl: 1    albuterol sulfate  (90 Base) MCG/ACT inhaler, Inhale 2 puffs Every 4 (Four) Hours As Needed for Wheezing., Disp: 8 g, Rfl: 10    aspirin 81 MG EC tablet, Take 1 tablet by mouth Daily., Disp: 90 tablet, Rfl: 3    azithromycin (ZITHROMAX) 250 MG tablet, Take 1 tablet by mouth Every Other Day. Indications: COPD Exacerbation Suppression, Disp: 15 tablet, Rfl: 5    clobetasol (TEMOVATE) 0.05 % external solution, Apply  topically to the appropriate area as directed 2 (Two) Times a Day., Disp: 50 mL, Rfl: 3    Continuous Glucose Sensor (FreeStyle Bela 3 Plus Sensor) misc, Use 1 each Every 14 (Fourteen) Days., Disp: 2 each, Rfl: 0    dilTIAZem CD (Cardizem CD) 300 MG 24 hr capsule, Take 1 capsule by mouth Daily., Disp: 90 capsule, Rfl: 2    fluconazole (DIFLUCAN) 150 MG tablet, Take 1 tablet by mouth Daily., Disp: 10 tablet, Rfl: 5    furosemide (LASIX) 40 MG tablet, Take 1 tablet by mouth Daily., Disp: 90 tablet, Rfl: 1    glipizide (Glucotrol) 5 MG tablet, Take 1 tablet by mouth 2 (Two) Times a Day Before Meals., Disp: 180 tablet,  "Rfl: 1    glucose blood (Accu-Chek Guide) test strip, 1 each by Other route Daily. Use as instructed, Disp: 100 each, Rfl: 2    Insulin Glargine (BASAGLAR KWIKPEN) 100 UNIT/ML injection pen, Inject 10 Units under the skin into the appropriate area as directed Every Night., Disp: 15 mL, Rfl: 5    Insulin Pen Needle (BD Pen Needle Sumi 2nd Gen) 32G X 4 MM misc, 1 each by Other route Take As Directed., Disp: 100 each, Rfl: 2    ipratropium-albuterol (DUO-NEB) 0.5-2.5 mg/3 ml nebulizer, Take 3 mL by nebulization 4 (Four) Times a Day., Disp: 360 mL, Rfl: 3    losartan (COZAAR) 100 MG tablet, Take 1 tablet by mouth Daily., Disp: 90 tablet, Rfl: 1    nitroglycerin (NITROSTAT) 0.4 MG SL tablet, Place 1 tablet under the tongue Every 5 (Five) Minutes As Needed for Chest Pain., Disp: 30 tablet, Rfl: 5    nystatin (MYCOSTATIN) 100,000 unit/mL suspension, Swish and swallow 5 mL 4 (Four) Times a Day., Disp: 473 mL, Rfl: 5    potassium chloride (MICRO-K) 10 MEQ CR capsule, Take 1 capsule by mouth 2 (Two) Times a Day., Disp: 180 capsule, Rfl: 1    pravastatin (PRAVACHOL) 40 MG tablet, Take 1 tablet by mouth Daily., Disp: 90 tablet, Rfl: 0    predniSONE (DELTASONE) 10 MG tablet, Take 1 tablet by mouth Daily. 5 po once a day for 2 days, 4 po once a day for 2 days, 3 po once a day for 2 days, 2 po once a day for 2 days, 1 po once a day for 2 days, Disp: 30 tablet, Rfl: 0    Symbicort 160-4.5 MCG/ACT inhaler, Inhale 2 puffs 2 (Two) Times a Day., Disp: 3 each, Rfl: 5    Vitals:    08/26/24 0934   BP: 122/70   Pulse: 61   SpO2: 96%   Weight: 67 kg (147 lb 9.6 oz)   Height: 175.3 cm (69\")     Body mass index is 21.8 kg/m².    PHYSICAL EXAM:    General Appearance:   well developed  well nourished  HENT:   oropharynx moist  lips not cyanotic  Neck:  thyroid not enlarged  supple  Respiratory:  no respiratory distress  normal breath sounds  no rales  Cardiovascular:  no jugular venous distention  regular rhythm  apical impulse normal  S1 " "normal, S2 normal  no S3, no S4   no murmur  no rub, no thrill  carotid pulses normal; no bruit  lower extremity edema: none      Musculoskeletal:  no clubbing of fingers.   normocephalic, head atraumatic  Skin:   warm, dry  Psychiatric:  judgement and insight appropriate  normal mood and affect    RESULTS:   Procedures    Results for orders placed in visit on 04/03/24    Adult Transthoracic Echo Complete W/ Cont if Necessary Per Protocol    Interpretation Summary    Left ventricular systolic function is hyperdynamic (EF > 70%). Calculated left ventricular EF = 69% Left ventricular ejection fraction appears to be greater than 70%.    Left ventricular diastolic function is consistent with (grade I) impaired relaxation.    Aortic valve area is 2.8 cm2. grossly normal valve    Peak velocity of the flow distal to the aortic valve is 123.3 cm/s. Aortic valve maximum pressure gradient is 6 mmHg. Aortic valve mean pressure gradient is 3 mmHg.    The mitral valve peak gradient is 4 mmHg. The mitral valve mean gradient is 2 mmHg. The mitral valve area (PHT) is 3.8 cm2.Normal valves    Estimated right ventricular systolic pressure from tricuspid regurgitation is normal (<35 mmHg). Calculated right ventricular systolic pressure from tricuspid regurgitation is 14 mmHg.    The aortic root measures 2.6 cm.    Normal RV size and function    No pericardial effusion    Limited parasternal windows        Labs:  Lab Results   Component Value Date    TRIG 243 (H) 04/10/2024    HDL 49 04/10/2024    LDL 56 04/10/2024    AST 12 04/10/2024    ALT 16 04/10/2024     Lab Results   Component Value Date    HGBA1C 9.2 (H) 04/10/2024     No components found for: \"CREATINININE\"  No results found for: \"EGFRIFNONA\"    Most recent PCP note, imaging tests, and labs reviewed.    ASSESSMENT:  Problem List Items Addressed This Visit       Primary hypertension    Hyperlipidemia, mixed    Coronary artery disease of native artery with stable angina pectoris " - Primary       PLAN:    1.  Chest pain:  Abnormal troponin elevated at 39, given ongoing chest pain recommend cardiac catheterization.  However patient is quite fearful of a stroke related to cardiac catheterization given his history of 2 strokes in the past and atherosclerosis of the aorta.    He asked for an alternative test, and I recommend coronary CTA, he is agreeable for this test.  We ordered it today.    Unless there is severe flow-limiting stenosis we will defer cardiac catheterization.    Continue medical therapy with aspirin and statin.    Continue as needed nitroglycerin, with instructions to go to the ER if any persistent chest pain occurs.    2.  Severe COPD:  Oxygen dependent, increases risk of cardiac catheterization but will minimize sedation.  Has appointment with [facility]. Ron Nguyen with [facility].    3.  Diabetes mellitus type 2:  Uncontrolled last A1c 9.2  Discussed importance of diabetes control for prevention of atherosclerotic disease.    4.  Primary hypertension:  Goal blood pressure less than 130/80  Well-controlled today, continue current medical therapy.    Advance Care Planning   ACP discussion was held with the patient during this visit. Patient has an advance directive (not in EMR), copy requested.         Return to clinic in 6 months, or sooner as needed.    Thank you for the opportunity to share in the care of your patient; please do not hesitate to call me with any questions.     Son Myers MD, [facility], [facility]  Office: [phone] [address] [place] [address]    08/26/24

## 2024-08-26 NOTE — TELEPHONE ENCOUNTER
HUB TO RELAY    PLEASE LET PATIENT KNOW HIS APPOINTMENT NEXT WEEK WITH DR. PRICE IS NEEDING TO BE RESCHEDULED. HE WILL BE OUT OF THE OFFICE. I LEFT HM A VOICEMAIL MAKING HIM AWARE    PLEASE RESCHEDULE PATIENT FOR PROVIDERS NEXT AVAILABLE APPOINTMENT

## 2024-09-04 ENCOUNTER — TELEPHONE (OUTPATIENT)
Dept: CARDIOLOGY | Facility: CLINIC | Age: 69
End: 2024-09-04
Payer: MEDICARE

## 2024-09-04 DIAGNOSIS — R60.0 BILATERAL LEG EDEMA: Primary | ICD-10-CM

## 2024-09-04 NOTE — TELEPHONE ENCOUNTER
Ara with [facility] P: [phone] LVM stating pt has bilateral 2+ pitting LE edema. She stated swelling goes down some after he lies down, but is not wearing compression stockings because he says they squeeze at the top, and doesn't take daily weights.     Returned Ara's call, no answer LVM. Attempted to reach pt, no answer LVM asking to return my call.

## 2024-09-05 ENCOUNTER — TELEPHONE (OUTPATIENT)
Dept: FAMILY MEDICINE CLINIC | Facility: CLINIC | Age: 69
End: 2024-09-05
Payer: MEDICARE

## 2024-09-05 RX ORDER — GLIPIZIDE 5 MG/1
5 TABLET ORAL
Qty: 180 TABLET | Refills: 0 | Status: SHIPPED | OUTPATIENT
Start: 2024-09-05 | End: 2024-09-05

## 2024-09-05 NOTE — TELEPHONE ENCOUNTER
Called patient to ask if he has started taking Glipizide again, he states he is not. Patient has a question for Dr. Bowen, he states that he is taking furosemide but is still retaining a lot of fluid. Patient states that when he lays down for bed in the evening that the swelling goes down but just as soon as he get's up of the morning, his feet swell back up. What are your recommendations?

## 2024-09-06 NOTE — TELEPHONE ENCOUNTER
"Spoke w/pt he states swelling gets a little better with elevation, but as soon as he stands up/walks around swelling comes back. Reports \"skin is tight\", denies SOB outside of his baseline with COPD. He is taking Lasix 40 mg daily. He was admitted to [facility] in August for COPD exacerbation, I'm having Nahomy get those records to review. Please advise.   "

## 2024-09-09 NOTE — TELEPHONE ENCOUNTER
Notified pt of RDS recommendations. He has an appt with Dr. Bowen on 9/16 and will get BMP then. He is going to double up on Lasix he has now until labs come back. Pt verbalized understanding and agreeable to plan

## 2024-09-10 ENCOUNTER — LAB (OUTPATIENT)
Dept: FAMILY MEDICINE CLINIC | Facility: CLINIC | Age: 69
End: 2024-09-10
Payer: MEDICARE

## 2024-09-10 DIAGNOSIS — J44.9 END STAGE COPD: ICD-10-CM

## 2024-09-10 DIAGNOSIS — N28.9 KIDNEY LESION, NATIVE, RIGHT: ICD-10-CM

## 2024-09-10 DIAGNOSIS — I10 PRIMARY HYPERTENSION: ICD-10-CM

## 2024-09-10 DIAGNOSIS — I25.118 CORONARY ARTERY DISEASE OF NATIVE ARTERY OF NATIVE HEART WITH STABLE ANGINA PECTORIS: ICD-10-CM

## 2024-09-10 DIAGNOSIS — R60.0 BILATERAL LEG EDEMA: ICD-10-CM

## 2024-09-10 DIAGNOSIS — E11.65 TYPE 2 DIABETES MELLITUS WITH HYPERGLYCEMIA, WITHOUT LONG-TERM CURRENT USE OF INSULIN: ICD-10-CM

## 2024-09-10 DIAGNOSIS — E78.2 HYPERLIPIDEMIA, MIXED: ICD-10-CM

## 2024-09-10 PROCEDURE — 36415 COLL VENOUS BLD VENIPUNCTURE: CPT | Performed by: FAMILY MEDICINE

## 2024-09-11 LAB
ALBUMIN SERPL-MCNC: 4.4 G/DL (ref 3.9–4.9)
ALP SERPL-CCNC: 104 IU/L (ref 44–121)
ALT SERPL-CCNC: 16 IU/L (ref 0–44)
AST SERPL-CCNC: 12 IU/L (ref 0–40)
BASOPHILS # BLD AUTO: 0.1 X10E3/UL (ref 0–0.2)
BASOPHILS NFR BLD AUTO: 1 %
BILIRUB SERPL-MCNC: 0.3 MG/DL (ref 0–1.2)
BUN SERPL-MCNC: 25 MG/DL (ref 8–27)
BUN/CREAT SERPL: 29 (ref 10–24)
CALCIUM SERPL-MCNC: 9.6 MG/DL (ref 8.6–10.2)
CHLORIDE SERPL-SCNC: 98 MMOL/L (ref 96–106)
CHOLEST SERPL-MCNC: 167 MG/DL (ref 100–199)
CO2 SERPL-SCNC: 31 MMOL/L (ref 20–29)
CREAT SERPL-MCNC: 0.87 MG/DL (ref 0.76–1.27)
EGFRCR SERPLBLD CKD-EPI 2021: 93 ML/MIN/1.73
EOSINOPHIL # BLD AUTO: 0.1 X10E3/UL (ref 0–0.4)
EOSINOPHIL NFR BLD AUTO: 1 %
ERYTHROCYTE [DISTWIDTH] IN BLOOD BY AUTOMATED COUNT: 12.2 % (ref 11.6–15.4)
GLOBULIN SER CALC-MCNC: 2 G/DL (ref 1.5–4.5)
GLUCOSE SERPL-MCNC: 154 MG/DL (ref 70–99)
HBA1C MFR BLD: 7.2 % (ref 4.8–5.6)
HCT VFR BLD AUTO: 41.2 % (ref 37.5–51)
HDLC SERPL-MCNC: 82 MG/DL
HGB BLD-MCNC: 13.3 G/DL (ref 13–17.7)
IMM GRANULOCYTES # BLD AUTO: 0 X10E3/UL (ref 0–0.1)
IMM GRANULOCYTES NFR BLD AUTO: 0 %
LDLC SERPL CALC-MCNC: 69 MG/DL (ref 0–99)
LYMPHOCYTES # BLD AUTO: 2.5 X10E3/UL (ref 0.7–3.1)
LYMPHOCYTES NFR BLD AUTO: 27 %
MCH RBC QN AUTO: 32.5 PG (ref 26.6–33)
MCHC RBC AUTO-ENTMCNC: 32.3 G/DL (ref 31.5–35.7)
MCV RBC AUTO: 101 FL (ref 79–97)
MONOCYTES # BLD AUTO: 0.9 X10E3/UL (ref 0.1–0.9)
MONOCYTES NFR BLD AUTO: 9 %
NEUTROPHILS # BLD AUTO: 5.9 X10E3/UL (ref 1.4–7)
NEUTROPHILS NFR BLD AUTO: 62 %
PLATELET # BLD AUTO: 308 X10E3/UL (ref 150–450)
POTASSIUM SERPL-SCNC: 4.2 MMOL/L (ref 3.5–5.2)
PROT SERPL-MCNC: 6.4 G/DL (ref 6–8.5)
RBC # BLD AUTO: 4.09 X10E6/UL (ref 4.14–5.8)
SODIUM SERPL-SCNC: 143 MMOL/L (ref 134–144)
TRIGL SERPL-MCNC: 89 MG/DL (ref 0–149)
TSH SERPL DL<=0.005 MIU/L-ACNC: 1.83 UIU/ML (ref 0.45–4.5)
VIT B12 SERPL-MCNC: 296 PG/ML (ref 232–1245)
VLDLC SERPL CALC-MCNC: 16 MG/DL (ref 5–40)
WBC # BLD AUTO: 9.4 X10E3/UL (ref 3.4–10.8)

## 2024-09-16 ENCOUNTER — OFFICE VISIT (OUTPATIENT)
Dept: FAMILY MEDICINE CLINIC | Facility: CLINIC | Age: 69
End: 2024-09-16
Payer: MEDICARE

## 2024-09-16 VITALS
WEIGHT: 148.2 LBS | RESPIRATION RATE: 16 BRPM | HEIGHT: 69 IN | HEART RATE: 93 BPM | OXYGEN SATURATION: 92 % | SYSTOLIC BLOOD PRESSURE: 128 MMHG | DIASTOLIC BLOOD PRESSURE: 84 MMHG | BODY MASS INDEX: 21.95 KG/M2

## 2024-09-16 DIAGNOSIS — E78.2 HYPERLIPIDEMIA, MIXED: ICD-10-CM

## 2024-09-16 DIAGNOSIS — I25.118 CORONARY ARTERY DISEASE OF NATIVE ARTERY OF NATIVE HEART WITH STABLE ANGINA PECTORIS: ICD-10-CM

## 2024-09-16 DIAGNOSIS — I10 PRIMARY HYPERTENSION: ICD-10-CM

## 2024-09-16 DIAGNOSIS — Z00.00 MEDICARE ANNUAL WELLNESS VISIT, SUBSEQUENT: Primary | ICD-10-CM

## 2024-09-16 DIAGNOSIS — J44.9 END STAGE COPD: ICD-10-CM

## 2024-09-16 DIAGNOSIS — R60.0 BILATERAL LEG EDEMA: ICD-10-CM

## 2024-09-16 DIAGNOSIS — E11.65 TYPE 2 DIABETES MELLITUS WITH HYPERGLYCEMIA, WITHOUT LONG-TERM CURRENT USE OF INSULIN: ICD-10-CM

## 2024-09-16 PROCEDURE — 36415 COLL VENOUS BLD VENIPUNCTURE: CPT | Performed by: FAMILY MEDICINE

## 2024-09-16 PROCEDURE — 3051F HG A1C>EQUAL 7.0%<8.0%: CPT | Performed by: FAMILY MEDICINE

## 2024-09-16 PROCEDURE — 3074F SYST BP LT 130 MM HG: CPT | Performed by: FAMILY MEDICINE

## 2024-09-16 PROCEDURE — 3079F DIAST BP 80-89 MM HG: CPT | Performed by: FAMILY MEDICINE

## 2024-09-16 PROCEDURE — G0439 PPPS, SUBSEQ VISIT: HCPCS | Performed by: FAMILY MEDICINE

## 2024-09-16 PROCEDURE — 1126F AMNT PAIN NOTED NONE PRSNT: CPT | Performed by: FAMILY MEDICINE

## 2024-09-16 PROCEDURE — 99214 OFFICE O/P EST MOD 30 MIN: CPT | Performed by: FAMILY MEDICINE

## 2024-09-16 RX ORDER — DILTIAZEM HYDROCHLORIDE 300 MG/1
300 CAPSULE, COATED, EXTENDED RELEASE ORAL DAILY
Qty: 90 CAPSULE | Refills: 2 | Status: SHIPPED | OUTPATIENT
Start: 2024-09-16

## 2024-09-16 RX ORDER — ALBUTEROL SULFATE 90 UG/1
2 AEROSOL, METERED RESPIRATORY (INHALATION) EVERY 4 HOURS PRN
Qty: 8 G | Refills: 10 | Status: SHIPPED | OUTPATIENT
Start: 2024-09-16

## 2024-09-16 RX ORDER — IPRATROPIUM BROMIDE AND ALBUTEROL SULFATE 2.5; .5 MG/3ML; MG/3ML
3 SOLUTION RESPIRATORY (INHALATION)
Qty: 360 ML | Refills: 3 | Status: SHIPPED | OUTPATIENT
Start: 2024-09-16

## 2024-09-16 RX ORDER — POTASSIUM CHLORIDE 750 MG/1
10 CAPSULE, EXTENDED RELEASE ORAL 2 TIMES DAILY
Qty: 180 CAPSULE | Refills: 1 | Status: SHIPPED | OUTPATIENT
Start: 2024-09-16

## 2024-09-16 RX ORDER — LOSARTAN POTASSIUM 100 MG/1
100 TABLET ORAL DAILY
Qty: 90 TABLET | Refills: 1 | Status: SHIPPED | OUTPATIENT
Start: 2024-09-16

## 2024-09-16 RX ORDER — DOXYCYCLINE 100 MG/1
100 TABLET ORAL 2 TIMES DAILY
Qty: 20 TABLET | Refills: 0 | Status: SHIPPED | OUTPATIENT
Start: 2024-09-16

## 2024-09-16 RX ORDER — FUROSEMIDE 40 MG
40 TABLET ORAL 2 TIMES DAILY
Qty: 180 TABLET | Refills: 0 | Status: SHIPPED | OUTPATIENT
Start: 2024-09-16

## 2024-09-16 RX ORDER — BUDESONIDE AND FORMOTEROL FUMARATE DIHYDRATE 160; 4.5 UG/1; UG/1
2 AEROSOL RESPIRATORY (INHALATION)
Qty: 3 EACH | Refills: 5 | Status: SHIPPED | OUTPATIENT
Start: 2024-09-16

## 2024-09-16 RX ORDER — PRAVASTATIN SODIUM 40 MG
40 TABLET ORAL DAILY
Qty: 90 TABLET | Refills: 0 | Status: SHIPPED | OUTPATIENT
Start: 2024-09-16

## 2024-09-16 RX ORDER — INSULIN GLARGINE 100 [IU]/ML
10 INJECTION, SOLUTION SUBCUTANEOUS NIGHTLY
Qty: 15 ML | Refills: 5 | Status: SHIPPED | OUTPATIENT
Start: 2024-09-16 | End: 2024-09-19 | Stop reason: SDUPTHER

## 2024-09-18 LAB
BUN SERPL-MCNC: 25 MG/DL (ref 8–27)
BUN/CREAT SERPL: 29 (ref 10–24)
CALCIUM SERPL-MCNC: 9.4 MG/DL (ref 8.6–10.2)
CHLORIDE SERPL-SCNC: 94 MMOL/L (ref 96–106)
CO2 SERPL-SCNC: 25 MMOL/L (ref 20–29)
CREAT SERPL-MCNC: 0.85 MG/DL (ref 0.76–1.27)
EGFRCR SERPLBLD CKD-EPI 2021: 94 ML/MIN/1.73
GLUCOSE SERPL-MCNC: 116 MG/DL (ref 70–99)
POTASSIUM SERPL-SCNC: 4.2 MMOL/L (ref 3.5–5.2)
SODIUM SERPL-SCNC: 143 MMOL/L (ref 134–144)

## 2024-09-19 DIAGNOSIS — E11.65 TYPE 2 DIABETES MELLITUS WITH HYPERGLYCEMIA, WITHOUT LONG-TERM CURRENT USE OF INSULIN: ICD-10-CM

## 2024-09-19 RX ORDER — INSULIN GLARGINE 100 [IU]/ML
10 INJECTION, SOLUTION SUBCUTANEOUS NIGHTLY
Qty: 15 ML | Refills: 5 | Status: SHIPPED | OUTPATIENT
Start: 2024-09-19

## 2024-09-19 RX ORDER — PEN NEEDLE, DIABETIC 32GX 5/32"
1 NEEDLE, DISPOSABLE MISCELLANEOUS TAKE AS DIRECTED
Qty: 100 EACH | Refills: 2 | Status: SHIPPED | OUTPATIENT
Start: 2024-09-19

## 2024-09-20 ENCOUNTER — TELEPHONE (OUTPATIENT)
Facility: HOSPITAL | Age: 69
End: 2024-09-20
Payer: MEDICARE

## 2024-09-23 ENCOUNTER — HOSPITAL ENCOUNTER (OUTPATIENT)
Facility: HOSPITAL | Age: 69
Discharge: HOME | End: 2024-09-23
Payer: MEDICARE

## 2024-09-23 VITALS
RESPIRATION RATE: 15 BRPM | DIASTOLIC BLOOD PRESSURE: 60 MMHG | WEIGHT: 148.92 LBS | SYSTOLIC BLOOD PRESSURE: 112 MMHG | TEMPERATURE: 98.1 F | OXYGEN SATURATION: 98 % | HEIGHT: 70 IN | HEART RATE: 58 BPM | BODY MASS INDEX: 21.32 KG/M2

## 2024-09-23 DIAGNOSIS — I25.118 CORONARY ARTERY DISEASE OF NATIVE ARTERY OF NATIVE HEART WITH STABLE ANGINA PECTORIS: ICD-10-CM

## 2024-09-23 DIAGNOSIS — R07.9 CHEST PAIN, UNSPECIFIED TYPE: ICD-10-CM

## 2024-09-23 PROCEDURE — 75574 CT ANGIO HRT W/3D IMAGE: CPT | Performed by: INTERNAL MEDICINE

## 2024-09-23 PROCEDURE — 25510000001 IOPAMIDOL PER 1 ML: Performed by: INTERNAL MEDICINE

## 2024-09-23 PROCEDURE — 75574 CT ANGIO HRT W/3D IMAGE: CPT

## 2024-09-23 RX ORDER — SODIUM CHLORIDE 0.9 % (FLUSH) 0.9 %
10 SYRINGE (ML) INJECTION EVERY 12 HOURS SCHEDULED
Status: DISCONTINUED | OUTPATIENT
Start: 2024-09-23 | End: 2024-09-24 | Stop reason: HOSPADM

## 2024-09-23 RX ORDER — IOPAMIDOL 755 MG/ML
100 INJECTION, SOLUTION INTRAVASCULAR
Status: COMPLETED | OUTPATIENT
Start: 2024-09-23 | End: 2024-09-23

## 2024-09-23 RX ORDER — METOPROLOL TARTRATE 1 MG/ML
5 INJECTION, SOLUTION INTRAVENOUS
Status: DISCONTINUED | OUTPATIENT
Start: 2024-09-23 | End: 2024-09-24 | Stop reason: HOSPADM

## 2024-09-23 RX ORDER — METOPROLOL TARTRATE 100 MG
200 TABLET ORAL ONCE
Status: COMPLETED | OUTPATIENT
Start: 2024-09-23 | End: 2024-09-23

## 2024-09-23 RX ORDER — METOPROLOL TARTRATE 100 MG
100 TABLET ORAL ONCE
Status: COMPLETED | OUTPATIENT
Start: 2024-09-23 | End: 2024-09-23

## 2024-09-23 RX ORDER — METOPROLOL TARTRATE 25 MG/1
25 TABLET, FILM COATED ORAL ONCE
Status: COMPLETED | OUTPATIENT
Start: 2024-09-23 | End: 2024-09-23

## 2024-09-23 RX ORDER — SODIUM CHLORIDE 9 MG/ML
40 INJECTION, SOLUTION INTRAVENOUS AS NEEDED
Status: DISCONTINUED | OUTPATIENT
Start: 2024-09-23 | End: 2024-09-24 | Stop reason: HOSPADM

## 2024-09-23 RX ORDER — NITROGLYCERIN 0.4 MG/1
0.4 TABLET SUBLINGUAL
Status: COMPLETED | OUTPATIENT
Start: 2024-09-23 | End: 2024-09-23

## 2024-09-23 RX ORDER — SODIUM CHLORIDE 0.9 % (FLUSH) 0.9 %
10 SYRINGE (ML) INJECTION AS NEEDED
Status: DISCONTINUED | OUTPATIENT
Start: 2024-09-23 | End: 2024-09-24 | Stop reason: HOSPADM

## 2024-09-23 RX ORDER — METOPROLOL TARTRATE 25 MG/1
50 TABLET, FILM COATED ORAL ONCE
Status: COMPLETED | OUTPATIENT
Start: 2024-09-23 | End: 2024-09-23

## 2024-09-23 RX ORDER — NITROGLYCERIN 0.4 MG/1
0.8 TABLET SUBLINGUAL
Status: COMPLETED | OUTPATIENT
Start: 2024-09-23 | End: 2024-09-23

## 2024-09-23 RX ORDER — IVABRADINE 5 MG/1
15 TABLET, FILM COATED ORAL ONCE
Status: COMPLETED | OUTPATIENT
Start: 2024-09-23 | End: 2024-09-23

## 2024-09-23 RX ORDER — METOPROLOL TARTRATE 25 MG/1
50 TABLET, FILM COATED ORAL
Status: DISCONTINUED | OUTPATIENT
Start: 2024-09-23 | End: 2024-09-24 | Stop reason: HOSPADM

## 2024-09-23 RX ADMIN — IOPAMIDOL 130 ML: 755 INJECTION, SOLUTION INTRAVENOUS at 13:21

## 2024-09-23 RX ADMIN — NITROGLYCERIN 0.8 MG: 0.4 TABLET SUBLINGUAL at 12:53

## 2024-09-23 RX ADMIN — IVABRADINE 15 MG: 5 TABLET, FILM COATED ORAL at 11:01

## 2024-09-23 RX ADMIN — METOPROLOL TARTRATE 150 MG: 100 TABLET, FILM COATED ORAL at 12:08

## 2024-09-26 ENCOUNTER — HOSPITAL ENCOUNTER (OUTPATIENT)
Dept: CT IMAGING | Facility: HOSPITAL | Age: 69
Discharge: HOME | End: 2024-09-26
Admitting: INTERNAL MEDICINE
Payer: MEDICARE

## 2024-09-26 ENCOUNTER — TELEPHONE (OUTPATIENT)
Dept: CARDIOLOGY | Facility: CLINIC | Age: 69
End: 2024-09-26

## 2024-09-26 ENCOUNTER — TELEPHONE (OUTPATIENT)
Dept: CARDIOLOGY | Facility: CLINIC | Age: 69
End: 2024-09-26
Payer: MEDICARE

## 2024-09-26 DIAGNOSIS — R93.1 ABNORMAL FINDINGS ON DIAGNOSTIC IMAGING OF HEART AND CORONARY CIRCULATION: Primary | ICD-10-CM

## 2024-09-26 DIAGNOSIS — R93.1 ABNORMAL FINDINGS ON DIAGNOSTIC IMAGING OF HEART AND CORONARY CIRCULATION: ICD-10-CM

## 2024-09-26 PROCEDURE — 75580 N-INVAS EST C FFR SW ALY CTA: CPT

## 2024-10-01 ENCOUNTER — TELEPHONE (OUTPATIENT)
Dept: FAMILY MEDICINE CLINIC | Facility: CLINIC | Age: 69
End: 2024-10-01

## 2024-10-01 NOTE — TELEPHONE ENCOUNTER
Caller: Iker Barber    Relationship: Self    Best call back number: [phone]    What is the best time to reach you: ASAP    Who are you requesting to speak with (clinical staff, provider,  specific staff member): CLINICAL       What was the call regarding: BLOOD SUGAR LEVELS    PATIENT WAS TAKEN OFF OF GLIPIZIDE AND WAS PRESCRIBED     Insulin Glargine (BASAGLAR KWIKPEN) 100 UNIT/ML injection pen TO TAKE AT NIGHT. SINCE BEING OFF GLIPIZIDE HIS LEVELS HAVE BEEN HIGH RANGING FROM 300-400.    PATIENT STARTED TAKING BASAGLAR KWIKPEN IN THE MORNING AND AT NIGHT BUT IT HAS NOT HELPED HIS LEVELS DURING THE DAY.    PATIENT KNOWS HE IS SUPPOSED TO SEE AN ENDOCRINOLOGIST BUT HE CAN NOT WAIT SEVERAL MONTHS TO BE SEEN, HE NEEDS TO BE REFERRED TO ONE THAT CAN SEE HIM ASAP, DOES NOT HAVE TO BE [facility]     DUE TO THE MEDICATION CHANGE HE MADE, HIS PHARMACY NEEDS A NEW PRESCRIPTION FOR BASAGLAR KWIKPWN TO TAKE 10 UNITS IN THE MORNING AND 10 UNITS AT NIGHT. PLEASE ADVISE AND CALL PATIENT     [id number] - [place], [place] - [address] AT [facility] [address] - [phone] [facility] [phone]

## 2024-10-02 ENCOUNTER — OFFICE VISIT (OUTPATIENT)
Dept: FAMILY MEDICINE CLINIC | Facility: CLINIC | Age: 69
End: 2024-10-02
Payer: MEDICARE

## 2024-10-02 VITALS
OXYGEN SATURATION: 95 % | WEIGHT: 149.1 LBS | HEART RATE: 111 BPM | HEIGHT: 70 IN | SYSTOLIC BLOOD PRESSURE: 128 MMHG | DIASTOLIC BLOOD PRESSURE: 82 MMHG | BODY MASS INDEX: 21.35 KG/M2

## 2024-10-02 DIAGNOSIS — E11.65 TYPE 2 DIABETES MELLITUS WITH HYPERGLYCEMIA, WITHOUT LONG-TERM CURRENT USE OF INSULIN: ICD-10-CM

## 2024-10-02 DIAGNOSIS — J44.9 END STAGE COPD: Primary | ICD-10-CM

## 2024-10-02 PROCEDURE — 1126F AMNT PAIN NOTED NONE PRSNT: CPT | Performed by: FAMILY MEDICINE

## 2024-10-02 PROCEDURE — 3051F HG A1C>EQUAL 7.0%<8.0%: CPT | Performed by: FAMILY MEDICINE

## 2024-10-02 PROCEDURE — G2211 COMPLEX E/M VISIT ADD ON: HCPCS | Performed by: FAMILY MEDICINE

## 2024-10-02 PROCEDURE — 3079F DIAST BP 80-89 MM HG: CPT | Performed by: FAMILY MEDICINE

## 2024-10-02 PROCEDURE — 3074F SYST BP LT 130 MM HG: CPT | Performed by: FAMILY MEDICINE

## 2024-10-02 PROCEDURE — 99214 OFFICE O/P EST MOD 30 MIN: CPT | Performed by: FAMILY MEDICINE

## 2024-10-02 RX ORDER — INSULIN GLARGINE 100 [IU]/ML
INJECTION, SOLUTION SUBCUTANEOUS
Qty: 15 ML | Refills: 5 | Status: SHIPPED | OUTPATIENT
Start: 2024-10-02

## 2024-10-02 NOTE — ASSESSMENT & PLAN NOTE
Patient has been referred to endocrinology.  Past patient's last A1c was well-controlled.  He saw his blood sugar up in the morning and he took an extra 10 units of Basaglar.  Knows this was AGAINST MEDICAL ADVICE.  He is concerned about not taking any medicine in the morning.  We had a long discussion about how he needs to listen to my instructions and not vary from them as I did not want his sugar bottoming out.  He has a current blood glucose value of 300.  We are going to add 5 units of Basaglar in the morning and 10 units at night.  He is going to come back in 10 days.  I am going to have him see endocrinology sooner if possible.

## 2024-10-02 NOTE — ASSESSMENT & PLAN NOTE
We have referred him to pulmonology.  He has refused COVID and flu shot.  He knows this is AGAINST MEDICAL ADVICE.

## 2024-10-02 NOTE — PROGRESS NOTES
Patient Name: Iker Barber  : 1955   MRN: [id number]     Chief Complaint:    Chief Complaint   Patient presents with    Rash on Face      Diabetes     Nail Problem       History of Present Illness: Iker Barber is a 69 y.o. male who is here today for follow up on BG  HPI        Review of Systems:   Review of Systems   Constitutional: Negative.    HENT: Negative.     Eyes: Negative.    Respiratory: Negative.     Cardiovascular: Negative.    Gastrointestinal: Negative.    Neurological: Negative.         Past Medical History:   Past Medical History:   Diagnosis Date    Anxiety     Arthritis     Asthma     Diabetes     Elevated blood pressure reading     Emphysema, unspecified     Esophageal reflux     Hearing problem     Heart disease     Hypercholesterolemia     Irritable bowel syndrome (IBS)     Peripheral artery disease     Skin disorder     Stroke (cerebrum)        Past Surgical History: No past surgical history on file.    Family History:   Family History   Problem Relation Age of Onset    Allergies Mother     Alzheimer's disease Mother     Diabetes Mother     Diabetes Father     Diabetes Brother        Social History:   Social History     Socioeconomic History    Marital status:    Tobacco Use    Smoking status: Former     Current packs/day: 0.00     Average packs/day: 1 pack/day for 40.0 years (40.0 ttl pk-yrs)     Types: Cigarettes     Start date:      Quit date:      Years since quittin.7     Passive exposure: Past    Smokeless tobacco: Never   Vaping Use    Vaping status: Never Used   Substance and Sexual Activity    Alcohol use: Not Currently    Drug use: Never    Sexual activity: Not Currently     Partners: Female       Medications:     Current Outpatient Medications:     Insulin Glargine (BASAGLAR KWIKPEN) 100 UNIT/ML injection pen, Inject 10 units of Basaglar in evening and 5 units in the morning., Disp: 15 mL, Rfl: 5    Accu-Chek Softclix Lancets  lancets, by Other route. Use as instructed, Disp: , Rfl:     albuterol (PROVENTIL) (2.5 MG/3ML) 0.083% nebulizer solution, Take 2.5 mg by nebulization Every 4 (Four) Hours As Needed for Shortness of Air., Disp: 540 each, Rfl: 1    albuterol sulfate  (90 Base) MCG/ACT inhaler, Inhale 2 puffs Every 4 (Four) Hours As Needed for Wheezing., Disp: 8 g, Rfl: 10    aspirin 81 MG EC tablet, Take 1 tablet by mouth Daily., Disp: 90 tablet, Rfl: 3    azithromycin (ZITHROMAX) 250 MG tablet, Take 1 tablet by mouth Every Other Day. Indications: COPD Exacerbation Suppression, Disp: 15 tablet, Rfl: 5    clobetasol (TEMOVATE) 0.05 % external solution, Apply  topically to the appropriate area as directed 2 (Two) Times a Day., Disp: 50 mL, Rfl: 3    Continuous Glucose Sensor (FreeStyle Bela 3 Plus Sensor) misc, Use 1 each Every 14 (Fourteen) Days., Disp: 2 each, Rfl: 0    dilTIAZem CD (Cardizem CD) 300 MG 24 hr capsule, Take 1 capsule by mouth Daily., Disp: 90 capsule, Rfl: 2    doxycycline (ADOXA) 100 MG tablet, Take 1 tablet by mouth 2 (Two) Times a Day., Disp: 20 tablet, Rfl: 0    fluconazole (DIFLUCAN) 150 MG tablet, Take 1 tablet by mouth Daily., Disp: 10 tablet, Rfl: 5    furosemide (LASIX) 40 MG tablet, Take 1 tablet by mouth 2 (Two) Times a Day., Disp: 180 tablet, Rfl: 0    glucose blood (Accu-Chek Guide) test strip, 1 each by Other route Daily. Use as instructed, Disp: 100 each, Rfl: 2    Insulin Pen Needle (BD Pen Needle Sumi 2nd Gen) 32G X 4 MM misc, 1 each by Other route Take As Directed., Disp: 100 each, Rfl: 2    ipratropium-albuterol (DUO-NEB) 0.5-2.5 mg/3 ml nebulizer, Take 3 mL by nebulization 4 (Four) Times a Day., Disp: 360 mL, Rfl: 3    losartan (COZAAR) 100 MG tablet, Take 1 tablet by mouth Daily., Disp: 90 tablet, Rfl: 1    nitroglycerin (NITROSTAT) 0.4 MG SL tablet, Place 1 tablet under the tongue Every 5 (Five) Minutes As Needed for Chest Pain., Disp: 30 tablet, Rfl: 5    nystatin (MYCOSTATIN) 100,000  "unit/mL suspension, Swish and swallow 5 mL 4 (Four) Times a Day., Disp: 473 mL, Rfl: 5    potassium chloride (MICRO-K) 10 MEQ CR capsule, Take 1 capsule by mouth 2 (Two) Times a Day., Disp: 180 capsule, Rfl: 1    pravastatin (PRAVACHOL) 40 MG tablet, Take 1 tablet by mouth Daily., Disp: 90 tablet, Rfl: 0    predniSONE (DELTASONE) 10 MG tablet, Take 1 tablet by mouth Daily. 5 po once a day for 2 days, 4 po once a day for 2 days, 3 po once a day for 2 days, 2 po once a day for 2 days, 1 po once a day for 2 days, Disp: 30 tablet, Rfl: 0    Symbicort 160-4.5 MCG/ACT inhaler, Inhale 2 puffs 2 (Two) Times a Day., Disp: 3 each, Rfl: 5    Allergies:   Allergies   Allergen Reactions    Methylprednisolone Confusion         Physical Exam:  Vital Signs:   Vitals:    10/02/24 1023   BP: 128/82   BP Location: Left arm   Patient Position: Sitting   Cuff Size: Adult   Pulse: 111   SpO2: 95%   Weight: 67.6 kg (149 lb 1.6 oz)   Height: 177.8 cm (70\")     Body mass index is 21.39 kg/m².     Physical Exam  Vitals and nursing note reviewed.   Constitutional:       Appearance: Normal appearance. He is normal weight.   HENT:      Head: Normocephalic and atraumatic.      Right Ear: External ear normal.      Left Ear: External ear normal.      Nose: Nose normal.   Eyes:      Extraocular Movements: Extraocular movements intact.      Conjunctiva/sclera: Conjunctivae normal.   Pulmonary:      Effort: Pulmonary effort is normal.   Musculoskeletal:      Cervical back: Normal range of motion.   Feet:      Comments:      Neurological:      General: No focal deficit present.      Mental Status: He is alert and oriented to person, place, and time. Mental status is at baseline.   Psychiatric:         Mood and Affect: Mood normal.         Behavior: Behavior normal.         Thought Content: Thought content normal.         Judgment: Judgment normal.         Procedures      Assessment/Plan:   Diagnoses and all orders for this visit:    1. End stage COPD " (Primary)  Assessment & Plan:  We have referred him to pulmonology.  He has refused COVID and flu shot.  He knows this is AGAINST MEDICAL ADVICE.      2. Type 2 diabetes mellitus with hyperglycemia, without long-term current use of insulin  Assessment & Plan:  Patient has been referred to endocrinology.  Past patient's last A1c was well-controlled.  He saw his blood sugar up in the morning and he took an extra 10 units of Basaglar.  Knows this was AGAINST MEDICAL ADVICE.  He is concerned about not taking any medicine in the morning.  We had a long discussion about how he needs to listen to my instructions and not vary from them as I did not want his sugar bottoming out.  He has a current blood glucose value of 300.  We are going to add 5 units of Basaglar in the morning and 10 units at night.  He is going to come back in 10 days.  I am going to have him see endocrinology sooner if possible.    Orders:  -     Insulin Glargine (BASAGLAR KWIKPEN) 100 UNIT/ML injection pen; Inject 10 units of Basaglar in evening and 5 units in the morning.  Dispense: 15 mL; Refill: 5             Follow Up:   No follow-ups on file.      Betito Bowen MD  [facility]

## 2024-10-10 ENCOUNTER — OFFICE VISIT (OUTPATIENT)
Dept: PULMONOLOGY | Facility: CLINIC | Age: 69
End: 2024-10-10
Payer: MEDICARE

## 2024-10-10 VITALS
SYSTOLIC BLOOD PRESSURE: 158 MMHG | HEART RATE: 70 BPM | WEIGHT: 149 LBS | OXYGEN SATURATION: 97 % | DIASTOLIC BLOOD PRESSURE: 84 MMHG | BODY MASS INDEX: 21.33 KG/M2 | HEIGHT: 70 IN | TEMPERATURE: 97.5 F

## 2024-10-10 DIAGNOSIS — Z87.891 FORMER SMOKER: ICD-10-CM

## 2024-10-10 DIAGNOSIS — J96.11 CHRONIC RESPIRATORY FAILURE WITH HYPOXIA AND HYPERCAPNIA: ICD-10-CM

## 2024-10-10 DIAGNOSIS — R93.89 ABNORMAL CHEST CT: ICD-10-CM

## 2024-10-10 DIAGNOSIS — J96.12 CHRONIC RESPIRATORY FAILURE WITH HYPOXIA AND HYPERCAPNIA: ICD-10-CM

## 2024-10-10 DIAGNOSIS — J44.9 COPD MIXED TYPE: Primary | ICD-10-CM

## 2024-10-10 NOTE — PROGRESS NOTES
PULMONARY  NOTE    Chief Complaint     COPD, former smoker, coronary artery disease, abnormal CT scan of the chest, chronic respiratory failure    History of Present Illness     69-year-old male referred for chronic lung disease    He has a long history of tobacco abuse starting at age 13  He stopped smoking about 5 years ago    He has chronic obstructive pulmonary disease  Previously followed at the [facility]    He has chronic mixed hypoxic and hypercarbic respiratory failure  He is on supplemental oxygen 24 hours a day  He has a stationary concentrator at home and E tanks when he uses when he is away from the home    This past year has had several hospitalizations  Has been hospitalized for flu and COVID pneumonia complicated by mixed respiratory failure  He has had a myocardial infarction  He had serial chest imaging revealing multiple parenchymal densities and also a right hilar lymph node as noted below    Patient Active Problem List   Diagnosis    Type 2 diabetes mellitus with hyperglycemia, without long-term current use of insulin    Primary hypertension    Medicare annual wellness visit, subsequent    Bilateral leg edema    Loss of weight    Hyperlipidemia, mixed    Dysuria    Candida infection, oral    Coronary artery disease of native artery with stable angina pectoris    Shortness of breath    Kidney lesion, native, right    Stage IV (Very severe) COPD    Former smoker (Stopped ~5 years)    Chronic respiratory failure with hypoxia and hypercapnia    Abnormal chest CT (Nodules, right hilar LN)      Allergies   Allergen Reactions    Methylprednisolone Confusion       Current Outpatient Medications:     Accu-Chek Softclix Lancets lancets, by Other route. Use as instructed, Disp: , Rfl:     albuterol (PROVENTIL) (2.5 MG/3ML) 0.083% nebulizer solution, Take 2.5 mg by nebulization Every 4 (Four) Hours As Needed for Shortness of Air., Disp: 540 each, Rfl: 1    albuterol sulfate  (90 Base) MCG/ACT inhaler,  Inhale 2 puffs Every 4 (Four) Hours As Needed for Wheezing., Disp: 8 g, Rfl: 10    aspirin 81 MG EC tablet, Take 1 tablet by mouth Daily., Disp: 90 tablet, Rfl: 3    azithromycin (ZITHROMAX) 250 MG tablet, Take 1 tablet by mouth Every Other Day. Indications: COPD Exacerbation Suppression, Disp: 15 tablet, Rfl: 5    clobetasol (TEMOVATE) 0.05 % external solution, Apply  topically to the appropriate area as directed 2 (Two) Times a Day., Disp: 50 mL, Rfl: 3    Continuous Glucose Sensor (FreeStyle Bela 3 Plus Sensor) misc, Use 1 each Every 14 (Fourteen) Days., Disp: 2 each, Rfl: 0    dilTIAZem CD (Cardizem CD) 300 MG 24 hr capsule, Take 1 capsule by mouth Daily., Disp: 90 capsule, Rfl: 2    doxycycline (ADOXA) 100 MG tablet, Take 1 tablet by mouth 2 (Two) Times a Day., Disp: 20 tablet, Rfl: 0    fluconazole (DIFLUCAN) 150 MG tablet, Take 1 tablet by mouth Daily., Disp: 10 tablet, Rfl: 5    furosemide (LASIX) 40 MG tablet, Take 1 tablet by mouth 2 (Two) Times a Day., Disp: 180 tablet, Rfl: 0    glucose blood (Accu-Chek Guide) test strip, 1 each by Other route Daily. Use as instructed, Disp: 100 each, Rfl: 2    Insulin Glargine (BASAGLAR KWIKPEN) 100 UNIT/ML injection pen, Inject 10 units of Basaglar in evening and 5 units in the morning., Disp: 15 mL, Rfl: 5    Insulin Pen Needle (BD Pen Needle Sumi 2nd Gen) 32G X 4 MM misc, 1 each by Other route Take As Directed., Disp: 100 each, Rfl: 2    ipratropium-albuterol (DUO-NEB) 0.5-2.5 mg/3 ml nebulizer, Take 3 mL by nebulization 4 (Four) Times a Day., Disp: 360 mL, Rfl: 3    losartan (COZAAR) 100 MG tablet, Take 1 tablet by mouth Daily., Disp: 90 tablet, Rfl: 1    nitroglycerin (NITROSTAT) 0.4 MG SL tablet, Place 1 tablet under the tongue Every 5 (Five) Minutes As Needed for Chest Pain., Disp: 30 tablet, Rfl: 5    nystatin (MYCOSTATIN) 100,000 unit/mL suspension, Swish and swallow 5 mL 4 (Four) Times a Day., Disp: 473 mL, Rfl: 5    potassium chloride (MICRO-K) 10 MEQ CR  "capsule, Take 1 capsule by mouth 2 (Two) Times a Day., Disp: 180 capsule, Rfl: 1    pravastatin (PRAVACHOL) 40 MG tablet, Take 1 tablet by mouth Daily., Disp: 90 tablet, Rfl: 0    Symbicort 160-4.5 MCG/ACT inhaler, Inhale 2 puffs 2 (Two) Times a Day., Disp: 3 each, Rfl: 5  MEDICATION LIST AND ALLERGIES REVIEWED.    Family History   Problem Relation Age of Onset    Allergies Mother     Alzheimer's disease Mother     Diabetes Mother     Diabetes Father     Diabetes Brother      Social History     Tobacco Use    Smoking status: Former     Current packs/day: 0.00     Average packs/day: 1 pack/day for 40.0 years (40.0 ttl pk-yrs)     Types: Cigarettes     Start date:      Quit date:      Years since quittin.7     Passive exposure: Past    Smokeless tobacco: Never   Vaping Use    Vaping status: Never Used   Substance Use Topics    Alcohol use: Not Currently    Drug use: Never     Social History     Social History Narrative    Not on file     FAMILY AND SOCIAL HISTORY REVIEWED.    Review of Systems  IF PRESENT REFER TO SCANNED ROS SHEET FROM SAME DATE  OTHERWISE ROS OBTAINED AND NON-CONTRIBUTORY OVER HPI.    /84   Pulse 70   Temp 97.5 °F (36.4 °C) (Infrared)   Ht 177.8 cm (70\")   Wt 67.6 kg (149 lb)   SpO2 97% Comment: 4L OF O2  BMI 21.38 kg/m²   Physical Exam  Vitals and nursing note reviewed.   Constitutional:       General: He is not in acute distress.     Appearance: He is well-developed. He is not diaphoretic.   HENT:      Head: Normocephalic and atraumatic.   Neck:      Thyroid: No thyromegaly.   Cardiovascular:      Rate and Rhythm: Normal rate and regular rhythm.      Heart sounds: Normal heart sounds. No murmur heard.  Pulmonary:      Effort: Pulmonary effort is normal.      Breath sounds: No stridor.      Comments: Decreased breath sounds bilaterally with prolonged expiratory phase and scattered wheezes  Lymphadenopathy:      Cervical: No cervical adenopathy.      Upper Body:      Right " upper body: No supraclavicular or epitrochlear adenopathy.      Left upper body: No supraclavicular or epitrochlear adenopathy.   Skin:     General: Skin is warm and dry.   Neurological:      Mental Status: He is alert and oriented to person, place, and time.   Psychiatric:         Behavior: Behavior normal.         Results     CT angiogram from September as well as CT scan of the chest from February reviewed on PACS  Diffuse emphysematous changes  Multiple parenchymal densities  Right hilar lymph node  Given the differences in technique is difficult to tell if there is change over the interval    There is no immunization history on file for this patient.  Problem List       ICD-10-CM ICD-9-CM   1. Stage IV (Very severe) COPD  J44.9 496   2. Chronic respiratory failure with hypoxia and hypercapnia  J96.11 518.83    J96.12 799.02     786.09   3. Abnormal chest CT (Nodules, right hilar LN)  R93.89 793.2   4. Former smoker (Stopped ~5 years)  Z87.891 V15.82       Discussion     He has stage IV, very severe, chronic obstructive pulmonary disease  This is characterized by mixed hypoxic and hypercarbic respiratory failure    We reviewed his chest imaging  He has multiple parenchymal abnormalities and a an enlarged right hilar lymph node  I suspect that this represents postinflammatory changes however malignancy cannot be excluded    We discussed the severity of his lung disease and the impact it will have on his options  Specifically he is not a surgical candidate and not really even a candidate for any kind of biopsy procedure  If he were ultimately found to have a highly suspicious lesion for malignancy we might consider empiric SBRT in the future    At this point have just recommended follow-up CT scan of the chest at the end of the year  I will see him back at that time    I have encouraged continued use of supplemental oxygen 24 hours a day    We might consider noninvasive positive pressure ventilation at night such  as with a trilogy system in the future    I am going to renew his medications including budesonide and DuoNeb and will add formoterol all via nebulizer  Might add Yupelri in the future    I will plan to see him back in December    Moderate level of Medical Decision Making complexity based on 1 undiagnosed new problem or 2 stable chronic conditions, independent interpretation of tests, and/or prescription drug management     Kee Nguyen MD  Note electronically signed    CC: Betito Bowen MD

## 2024-10-11 NOTE — PROGRESS NOTES
Chief complaint/Reason for consult: T2DM    Consult requested by Betito Bowen MD    HPI: Mr. Barber is a 69-year-old man with T2DM, hypertension, hyperlipidemia and COPD on chronic supplemental oxygen who comes for consultation of uncontrolled diabetes.    # Kwyk2LG, Uncontrolled due to hyperglycemia with complications  # CAD with history   # Diabetic polyneuropathy   # Steroid induced hyperglycemia   - Diagnosed: 2012 based on routine labs   - Previously did very well with just glipizide but a few years ago he had worsened hyperglycemia and hypoglycemia   - Current regimen includes: Lantus 5 units in the morning and 10 units in the evening,  - Compliance with medications is good  - HbA1c: 7.2% on 9/10/2024  - Wearing CGM     CGM Download  -Dates reviewed: 10/3/2024-10/16/2024  -Data: 95% wear time, average glucose 190 mg/dL, GMI 7.9%, glucose variability 32.3%, 16% very high, 35% high, 49% time in range, 0% low and 0% very low  -Interpretation: Downtrending overnight glucose with borderline hypoglycemia and daytime hyperglycemia    - Takes prednisone occasionally for breathing issues (20mg a few times per month)   - Hypoglycemia occurs occasionally with fasting   - Patient does not have symptoms with lows that he knows of   - Hyperglycemia occurs in the day time   - Patient reports neuropathy   - Patient denies gastroparesis   - Patient reports rotating injection sites   - Patient with known ASCVD   - taking ACEi/ARB; blood pressure today 126/80     DM Health Maintenance:  Ophtho: due, has a history of lens replacement in 2017   Monofilament / Foot exam: due   Lipids/Statin: taking a statin with last FLP showing LDL 69 done 9/10/2024  LILIAN: 20 done 8/19/2024  TSH: 1.830 done 9/10/2024  Aspirin: taking    Past medical history, past surgical history, family history and social history reviewed within this encounter.     Review of Systems   Constitutional:  Positive for activity change and fatigue.   HENT:  Negative  "for trouble swallowing.    Respiratory:  Positive for shortness of breath.    Cardiovascular:  Positive for leg swelling. Negative for chest pain.   Gastrointestinal:  Negative for abdominal pain.   Endocrine: Negative for cold intolerance.   Musculoskeletal:  Negative for gait problem.   Skin:  Negative for rash.   Neurological:  Positive for numbness.   Psychiatric/Behavioral:  Negative for agitation.         /80 (BP Location: Right arm, Patient Position: Sitting, Cuff Size: Adult)   Pulse 67   Ht 177.8 cm (70\")   Wt 67.9 kg (149 lb 12.8 oz)   SpO2 93%   BMI 21.49 kg/m²      Physical Exam  Vitals reviewed.   Constitutional:       General: He is not in acute distress.     Appearance: He is normal weight.   HENT:      Head: Normocephalic.      Nose: Nose normal.   Eyes:      Conjunctiva/sclera: Conjunctivae normal.   Cardiovascular:      Rate and Rhythm: Normal rate.      Pulses: Normal pulses.           Dorsalis pedis pulses are 2+ on the right side and 2+ on the left side.   Pulmonary:      Comments: Slightly increased effort, using portable nasal cannula oxygen  Abdominal:      Tenderness: There is no guarding.   Musculoskeletal:      Right foot: No deformity or bunion.      Left foot: No deformity or bunion.   Feet:      Right foot:      Protective Sensation: 8 sites tested.  0 sites sensed.      Skin integrity: Skin integrity normal.      Toenail Condition: Fungal disease present.     Left foot:      Protective Sensation: 8 sites tested.  0 sites sensed.      Skin integrity: Skin integrity normal.      Toenail Condition: Fungal disease present.     Comments: Absent sensation to monofilament bilaterally  Skin:     General: Skin is warm and dry.   Neurological:      Mental Status: He is alert and oriented to person, place, and time.   Psychiatric:         Mood and Affect: Mood normal.         Behavior: Behavior normal.        Labs and images reviewed as noted in the HPI    Assessment and " plan:    Diagnoses and all orders for this visit:    1. Type 2 diabetes mellitus with hyperglycemia, with long-term current use of insulin (Primary)  Assessment & Plan:  -Diabetes is uncontrolled due to hyperglycemia as well as borderline hypoglycemia with fasting  -Complications include known CAD and diabetic polyneuropathy  -Additionally patient with steroid-induced hyperglycemia when he takes prednisone for his breathing issues  -Patient is high risk for complications due to persistent hyperglycemia; counseled that long term hyperglycemia can cause worsening neuropathy, kidney damage, eye damage and cardiovascular disease  -Stop Basaglar  -Start 70/30; 10 units prior to breakfast and 6 units prior to supper  -Increase dose by 2 units if glucoses are persistently above 180 mg/dL  -Increase dose to 20 units with breakfast and 8 units with supper when he takes prednisone  -Continue CGM  -Continue losartan; blood pressure today 126/80     DM Health Maintenance:  Ophtho: due, has a history of lens replacement in 2017   Monofilament / Foot exam: Completed today, abnormal  Lipids/Statin: taking a statin with last FLP showing LDL 69 done 9/10/2024  LILIAN: 20 done 8/19/2024  TSH: 1.830 done 9/10/2024  Aspirin: taking      Orders:  -     insulin aspart prot & aspart (NovoLOG Mix 70/30 FlexPen) (70-30) 100 UNIT/ML suspension pen-injector injection; Inject 10u with breakfast and 6u with supper, titrate for a max daily dose of 50u  Dispense: 15 mL; Refill: 5  -     Insulin Pen Needle (BD Pen Needle Sumi 2nd Gen) 32G X 4 MM misc; Use twice daily as directed  Dispense: 200 each; Refill: 3         Return in about 3 months (around 1/16/2025) for T2DM.       Please note that portions of this note may have been completed with a voice recognition program. Efforts were made to edit the dictations, but occasionally words are mistranscribed.     Electronically signed by: Kyle S Rosenstein, MD

## 2024-10-14 ENCOUNTER — OFFICE VISIT (OUTPATIENT)
Dept: FAMILY MEDICINE CLINIC | Facility: CLINIC | Age: 69
End: 2024-10-14
Payer: MEDICARE

## 2024-10-14 VITALS
OXYGEN SATURATION: 96 % | HEART RATE: 68 BPM | DIASTOLIC BLOOD PRESSURE: 84 MMHG | BODY MASS INDEX: 21.55 KG/M2 | WEIGHT: 150.5 LBS | HEIGHT: 70 IN | SYSTOLIC BLOOD PRESSURE: 128 MMHG

## 2024-10-14 DIAGNOSIS — J96.11 CHRONIC RESPIRATORY FAILURE WITH HYPOXIA AND HYPERCAPNIA: Primary | ICD-10-CM

## 2024-10-14 DIAGNOSIS — E11.65 TYPE 2 DIABETES MELLITUS WITH HYPERGLYCEMIA, WITHOUT LONG-TERM CURRENT USE OF INSULIN: ICD-10-CM

## 2024-10-14 DIAGNOSIS — I10 PRIMARY HYPERTENSION: ICD-10-CM

## 2024-10-14 DIAGNOSIS — R93.89 ABNORMAL CHEST CT: ICD-10-CM

## 2024-10-14 DIAGNOSIS — J96.12 CHRONIC RESPIRATORY FAILURE WITH HYPOXIA AND HYPERCAPNIA: Primary | ICD-10-CM

## 2024-10-14 PROCEDURE — 1126F AMNT PAIN NOTED NONE PRSNT: CPT | Performed by: FAMILY MEDICINE

## 2024-10-14 PROCEDURE — 3051F HG A1C>EQUAL 7.0%<8.0%: CPT | Performed by: FAMILY MEDICINE

## 2024-10-14 PROCEDURE — G2211 COMPLEX E/M VISIT ADD ON: HCPCS | Performed by: FAMILY MEDICINE

## 2024-10-14 PROCEDURE — 3074F SYST BP LT 130 MM HG: CPT | Performed by: FAMILY MEDICINE

## 2024-10-14 PROCEDURE — 99214 OFFICE O/P EST MOD 30 MIN: CPT | Performed by: FAMILY MEDICINE

## 2024-10-14 PROCEDURE — 3079F DIAST BP 80-89 MM HG: CPT | Performed by: FAMILY MEDICINE

## 2024-10-14 NOTE — ASSESSMENT & PLAN NOTE
Patient has seen pulmonology.  They have changed his nebulizer.  Will recheck in a month make sure he is doing okay.  Patient still refuses COVID and flu shot.

## 2024-10-14 NOTE — PROGRESS NOTES
Patient Name: Iker Barber  : 1955   MRN: [id number]     Chief Complaint:    Chief Complaint   Patient presents with    Follow-up       History of Present Illness: Iker Barber is a 69 y.o. male who is here today for follow up on BG and emphysiema  Follow-up          Review of Systems:   Review of Systems   Constitutional: Negative.    HENT: Negative.     Eyes: Negative.    Respiratory: Negative.     Cardiovascular: Negative.    Gastrointestinal: Negative.    Neurological: Negative.         Past Medical History:   Past Medical History:   Diagnosis Date    Anxiety     Arthritis     Asthma     Diabetes     Elevated blood pressure reading     Emphysema, unspecified     Esophageal reflux     Hearing problem     Heart disease     Hypercholesterolemia     Irritable bowel syndrome (IBS)     Peripheral artery disease     Skin disorder     Stroke (cerebrum)        Past Surgical History: No past surgical history on file.    Family History:   Family History   Problem Relation Age of Onset    Allergies Mother     Alzheimer's disease Mother     Diabetes Mother     Diabetes Father     Diabetes Brother        Social History:   Social History     Socioeconomic History    Marital status:    Tobacco Use    Smoking status: Former     Current packs/day: 0.00     Average packs/day: 1 pack/day for 40.0 years (40.0 ttl pk-yrs)     Types: Cigarettes     Start date:      Quit date:      Years since quittin.7     Passive exposure: Past    Smokeless tobacco: Never   Vaping Use    Vaping status: Never Used   Substance and Sexual Activity    Alcohol use: Not Currently    Drug use: Never    Sexual activity: Not Currently     Partners: Female       Medications:     Current Outpatient Medications:     Accu-Chek Softclix Lancets lancets, by Other route. Use as instructed, Disp: , Rfl:     albuterol (PROVENTIL) (2.5 MG/3ML) 0.083% nebulizer solution, Take 2.5 mg by nebulization Every 4 (Four)  Hours As Needed for Shortness of Air., Disp: 540 each, Rfl: 1    albuterol sulfate  (90 Base) MCG/ACT inhaler, Inhale 2 puffs Every 4 (Four) Hours As Needed for Wheezing., Disp: 8 g, Rfl: 10    aspirin 81 MG EC tablet, Take 1 tablet by mouth Daily., Disp: 90 tablet, Rfl: 3    clobetasol (TEMOVATE) 0.05 % external solution, Apply  topically to the appropriate area as directed 2 (Two) Times a Day., Disp: 50 mL, Rfl: 3    Continuous Glucose Sensor (FreeStyle Bela 3 Plus Sensor) misc, Use 1 each Every 14 (Fourteen) Days., Disp: 2 each, Rfl: 0    dilTIAZem CD (Cardizem CD) 300 MG 24 hr capsule, Take 1 capsule by mouth Daily., Disp: 90 capsule, Rfl: 2    doxycycline (ADOXA) 100 MG tablet, Take 1 tablet by mouth 2 (Two) Times a Day., Disp: 20 tablet, Rfl: 0    fluconazole (DIFLUCAN) 150 MG tablet, Take 1 tablet by mouth Daily., Disp: 10 tablet, Rfl: 5    furosemide (LASIX) 40 MG tablet, Take 1 tablet by mouth 2 (Two) Times a Day., Disp: 180 tablet, Rfl: 0    glucose blood (Accu-Chek Guide) test strip, 1 each by Other route Daily. Use as instructed, Disp: 100 each, Rfl: 2    Insulin Glargine (BASAGLAR KWIKPEN) 100 UNIT/ML injection pen, Inject 10 units of Basaglar in evening and 5 units in the morning., Disp: 15 mL, Rfl: 5    Insulin Pen Needle (BD Pen Needle Sumi 2nd Gen) 32G X 4 MM misc, 1 each by Other route Take As Directed., Disp: 100 each, Rfl: 2    ipratropium-albuterol (DUO-NEB) 0.5-2.5 mg/3 ml nebulizer, Take 3 mL by nebulization 4 (Four) Times a Day., Disp: 360 mL, Rfl: 3    losartan (COZAAR) 100 MG tablet, Take 1 tablet by mouth Daily., Disp: 90 tablet, Rfl: 1    nitroglycerin (NITROSTAT) 0.4 MG SL tablet, Place 1 tablet under the tongue Every 5 (Five) Minutes As Needed for Chest Pain., Disp: 30 tablet, Rfl: 5    nystatin (MYCOSTATIN) 100,000 unit/mL suspension, Swish and swallow 5 mL 4 (Four) Times a Day., Disp: 473 mL, Rfl: 5    potassium chloride (MICRO-K) 10 MEQ CR capsule, Take 1 capsule by mouth 2  "(Two) Times a Day., Disp: 180 capsule, Rfl: 1    pravastatin (PRAVACHOL) 40 MG tablet, Take 1 tablet by mouth Daily., Disp: 90 tablet, Rfl: 0    Symbicort 160-4.5 MCG/ACT inhaler, Inhale 2 puffs 2 (Two) Times a Day., Disp: 3 each, Rfl: 5    Allergies:   Allergies   Allergen Reactions    Methylprednisolone Confusion         Physical Exam:  Vital Signs:   Vitals:    10/14/24 0831   BP: 128/84   BP Location: Left arm   Patient Position: Sitting   Cuff Size: Adult   Pulse: 68   SpO2: 96%   Weight: 68.3 kg (150 lb 8 oz)   Height: 177.8 cm (70\")     Body mass index is 21.59 kg/m².     Physical Exam  Vitals and nursing note reviewed.   Constitutional:       Appearance: Normal appearance. He is normal weight.   HENT:      Head: Normocephalic and atraumatic.      Right Ear: Tympanic membrane, ear canal and external ear normal.      Left Ear: Tympanic membrane, ear canal and external ear normal.      Nose: Nose normal.      Mouth/Throat:      Mouth: Mucous membranes are dry.      Pharynx: Oropharynx is clear.   Eyes:      Extraocular Movements: Extraocular movements intact.      Conjunctiva/sclera: Conjunctivae normal.      Pupils: Pupils are equal, round, and reactive to light.   Cardiovascular:      Rate and Rhythm: Normal rate and regular rhythm.      Pulses: Normal pulses.      Heart sounds: Normal heart sounds.   Pulmonary:      Effort: Pulmonary effort is normal.      Breath sounds: Normal breath sounds.   Musculoskeletal:      Cervical back: Normal range of motion and neck supple.   Feet:      Comments:      Neurological:      Mental Status: He is alert.         Procedures      Assessment/Plan:   Diagnoses and all orders for this visit:    1. Chronic respiratory failure with hypoxia and hypercapnia (Primary)  Assessment & Plan:  Patient has seen pulmonology.  They have changed his nebulizer.  Will recheck in a month make sure he is doing okay.  Patient still refuses COVID and flu shot.      2. Type 2 diabetes mellitus " with hyperglycemia, without long-term current use of insulin  Assessment & Plan:  Patient has appointment to see endocrinology on Wednesday.  He is concerned about his sugars.  I again reemphasized that his last A1c was fairly well-controlled.  He wants endocrinology to give an opinion.  I will see him back in a month to see what they say.      3. Primary hypertension  Assessment & Plan:  Discussed with patient to monitor their blood pressure and if systolic blood pressure goes above 140 or diastolic is above 90 to return to clinic.  Take medicines as directed, call for any problems, patient not having or any worrisome symptoms.          4. Abnormal chest CT (Nodules, right hilar LN)  Assessment & Plan:  Patient has appointment to see pulmonology for repeat CT in December.               Follow Up:   No follow-ups on file.      Betito Bowen MD  [facility]

## 2024-10-14 NOTE — ASSESSMENT & PLAN NOTE
Patient has appointment to see endocrinology on Wednesday.  He is concerned about his sugars.  I again reemphasized that his last A1c was fairly well-controlled.  He wants endocrinology to give an opinion.  I will see him back in a month to see what they say.

## 2024-10-16 ENCOUNTER — OFFICE VISIT (OUTPATIENT)
Dept: ENDOCRINOLOGY | Facility: CLINIC | Age: 69
End: 2024-10-16
Payer: MEDICARE

## 2024-10-16 ENCOUNTER — TELEPHONE (OUTPATIENT)
Dept: ENDOCRINOLOGY | Facility: CLINIC | Age: 69
End: 2024-10-16
Payer: MEDICARE

## 2024-10-16 VITALS
SYSTOLIC BLOOD PRESSURE: 126 MMHG | WEIGHT: 149.8 LBS | HEART RATE: 67 BPM | BODY MASS INDEX: 21.45 KG/M2 | HEIGHT: 70 IN | OXYGEN SATURATION: 93 % | DIASTOLIC BLOOD PRESSURE: 80 MMHG

## 2024-10-16 DIAGNOSIS — Z79.4 TYPE 2 DIABETES MELLITUS WITH HYPERGLYCEMIA, WITH LONG-TERM CURRENT USE OF INSULIN: Primary | ICD-10-CM

## 2024-10-16 DIAGNOSIS — E11.65 TYPE 2 DIABETES MELLITUS WITH HYPERGLYCEMIA, WITH LONG-TERM CURRENT USE OF INSULIN: Primary | ICD-10-CM

## 2024-10-16 PROCEDURE — 95251 CONT GLUC MNTR ANALYSIS I&R: CPT | Performed by: HOSPITALIST

## 2024-10-16 PROCEDURE — 3079F DIAST BP 80-89 MM HG: CPT | Performed by: HOSPITALIST

## 2024-10-16 PROCEDURE — 99204 OFFICE O/P NEW MOD 45 MIN: CPT | Performed by: HOSPITALIST

## 2024-10-16 PROCEDURE — 3051F HG A1C>EQUAL 7.0%<8.0%: CPT | Performed by: HOSPITALIST

## 2024-10-16 PROCEDURE — 1160F RVW MEDS BY RX/DR IN RCRD: CPT | Performed by: HOSPITALIST

## 2024-10-16 PROCEDURE — 3074F SYST BP LT 130 MM HG: CPT | Performed by: HOSPITALIST

## 2024-10-16 PROCEDURE — 1159F MED LIST DOCD IN RCRD: CPT | Performed by: HOSPITALIST

## 2024-10-16 RX ORDER — INSULIN ASPART 100 [IU]/ML
INJECTION, SUSPENSION SUBCUTANEOUS
Qty: 15 ML | Refills: 5 | Status: SHIPPED | OUTPATIENT
Start: 2024-10-16

## 2024-10-16 RX ORDER — PEN NEEDLE, DIABETIC 32GX 5/32"
NEEDLE, DISPOSABLE MISCELLANEOUS
Qty: 200 EACH | Refills: 3 | Status: SHIPPED | OUTPATIENT
Start: 2024-10-16

## 2024-10-16 RX ORDER — PREDNISONE 20 MG/1
20 TABLET ORAL AS NEEDED
COMMUNITY

## 2024-10-16 NOTE — TELEPHONE ENCOUNTER
Hub staff attempted to follow warm transfer process and was unsuccessful     Caller: Iker Barber    Relationship to patient: Self    Best call back number: [phone]    Patient is needing: A PRIOR AUTHORIZATION IS NEEDED FOR THE PATIENT TO RECEIVE THE insulin aspart prot & aspart (NovoLOG Mix 70/30 FlexPen) (70-30) 100 UNIT/ML suspension pen-injector injection MEDICATION. PT ASKED THAT WE SUBMIT THE PRIOR AUTHORIZATION AS SOON AS POSSIBLE  AND TO CALL BACK ONCE COMPLETED

## 2024-10-16 NOTE — ASSESSMENT & PLAN NOTE
-Diabetes is uncontrolled due to hyperglycemia as well as borderline hypoglycemia with fasting  -Complications include known CAD and diabetic polyneuropathy  -Additionally patient with steroid-induced hyperglycemia when he takes prednisone for his breathing issues  -Patient is high risk for complications due to persistent hyperglycemia; counseled that long term hyperglycemia can cause worsening neuropathy, kidney damage, eye damage and cardiovascular disease  -Stop Basaglar  -Start 70/30; 10 units prior to breakfast and 6 units prior to supper  -Increase dose by 2 units if glucoses are persistently above 180 mg/dL  -Increase dose to 20 units with breakfast and 8 units with supper when he takes prednisone  -Continue CGM  -Continue losartan; blood pressure today 126/80     DM Health Maintenance:  Ophtho: due, has a history of lens replacement in 2017   Monofilament / Foot exam: Completed today, abnormal  Lipids/Statin: taking a statin with last FLP showing LDL 69 done 9/10/2024  LILIAN: 20 done 8/19/2024  TSH: 1.830 done 9/10/2024  Aspirin: taking

## 2024-10-17 DIAGNOSIS — Z87.891 FORMER SMOKER: ICD-10-CM

## 2024-10-17 DIAGNOSIS — R93.89 ABNORMAL CHEST CT: Primary | ICD-10-CM

## 2024-10-17 NOTE — TELEPHONE ENCOUNTER
Called the phar and asked if they would run the insulin asp prot and asp flexpen 70/30 under brand name.    They are going to fill with brand name.

## 2024-11-07 ENCOUNTER — PATIENT OUTREACH (OUTPATIENT)
Dept: CASE MANAGEMENT | Facility: OTHER | Age: 69
End: 2024-11-07
Payer: MEDICARE

## 2024-11-07 DIAGNOSIS — I10 PRIMARY HYPERTENSION: ICD-10-CM

## 2024-11-07 DIAGNOSIS — J44.9 END STAGE COPD: ICD-10-CM

## 2024-11-07 DIAGNOSIS — E11.65 TYPE 2 DIABETES MELLITUS WITH HYPERGLYCEMIA, WITHOUT LONG-TERM CURRENT USE OF INSULIN: Primary | ICD-10-CM

## 2024-11-13 DIAGNOSIS — Z79.4 TYPE 2 DIABETES MELLITUS WITH HYPERGLYCEMIA, WITH LONG-TERM CURRENT USE OF INSULIN: ICD-10-CM

## 2024-11-13 DIAGNOSIS — E11.65 TYPE 2 DIABETES MELLITUS WITH HYPERGLYCEMIA, WITH LONG-TERM CURRENT USE OF INSULIN: ICD-10-CM

## 2024-11-13 RX ORDER — INSULIN ASPART 100 [IU]/ML
INJECTION, SUSPENSION SUBCUTANEOUS
Qty: 15 ML | Refills: 1 | Status: SHIPPED | OUTPATIENT
Start: 2024-11-13

## 2024-11-13 NOTE — TELEPHONE ENCOUNTER
Rx Refill Note  Requested Prescriptions     Pending Prescriptions Disp Refills    Insulin Aspart Prot & Aspart (Insulin Asp Prot & Asp FlexPen) (70-30) 100 UNIT/ML suspension pen-injector [Pharmacy Med Name: INSULIN ASPA MIX 70/30 FLEXPEN, 3ML] 15 mL 1     Sig: INJECT 10 UNITS UNDER THE SKIN WITH BREAKFAST AND 6 UNITS WITH SUPPER, TITRATE AS DIRECTED FOR A MAX DAILY DOSE OF 50 UNITS      Last office visit with prescribing clinician: 10/16/2024     Next office visit with prescribing clinician: 5/21/2025     Doretha Donaldson MA  11/13/24, 13:42 EST

## 2024-11-21 ENCOUNTER — TELEPHONE (OUTPATIENT)
Dept: FAMILY MEDICINE CLINIC | Facility: CLINIC | Age: 69
End: 2024-11-21
Payer: MEDICARE

## 2024-11-21 NOTE — TELEPHONE ENCOUNTER
Caller: [employer]    Relationship:     Best call back number: [phone]    What form or medical record are you requesting: LAST OFFICE VISIT NOTES     Who is requesting this form or medical record from you: [employer]     How would you like to receive the form or medical records (pick-up, mail, fax): FAX  If fax, what is the fax number: [phone]  Timeframe paperwork needed: ASAP    Additional notes: COMPANY IS NEEDING LAST OFFICE NOTE SO PATIENT CAN RECEIVE DIABETIC MEDICAL SUPPLIES.

## 2024-11-27 ENCOUNTER — PATIENT OUTREACH (OUTPATIENT)
Dept: CASE MANAGEMENT | Facility: OTHER | Age: 69
End: 2024-11-27
Payer: MEDICARE

## 2024-11-27 DIAGNOSIS — I10 PRIMARY HYPERTENSION: ICD-10-CM

## 2024-11-27 DIAGNOSIS — J44.9 END STAGE COPD: ICD-10-CM

## 2024-11-27 DIAGNOSIS — E11.65 TYPE 2 DIABETES MELLITUS WITH HYPERGLYCEMIA, WITHOUT LONG-TERM CURRENT USE OF INSULIN: Primary | ICD-10-CM

## 2024-11-27 NOTE — OUTREACH NOTE
[facility] End of Month Documentation    This Chronic Medical Management Care Plan for Iker Barber, 69 y.o. male, has been reviewed; monitored and managed and a new plan of care implemented for the month of November.  A cumulative time of 38  minutes was spent on this patient record this month, including chart review; phone call with patient.    Regarding the patient's problems: has Type 2 diabetes mellitus with hyperglycemia, with long-term current use of insulin; Primary hypertension; Medicare annual wellness visit, subsequent; Bilateral leg edema; Loss of weight; Hyperlipidemia, mixed; Dysuria; Candida infection, oral; Coronary artery disease of native artery with stable angina pectoris; Shortness of breath; Kidney lesion, native, right; Stage IV (Very severe) COPD; Former smoker (Stopped ~5 years); Chronic respiratory failure with hypoxia and hypercapnia; and Abnormal chest CT (Nodules, right hilar LN) on their problem list., the following items were addressed: medical records; medications and any changes can be found within the plan section of the note.  A detailed listing of time spent for chronic care management is tracked within each outreach encounter.  Current medications include:  has a current medication list which includes the following prescription(s): accu-chek softclix lancets, albuterol, albuterol sulfate hfa, aspirin, clobetasol, freestyle zacarias 3 plus sensor, diltiazem cd, fluconazole, furosemide, accu-chek guide, insulin asp prot & asp flexpen, bd pen needle mansi 2nd gen, ipratropium-albuterol, losartan, nitroglycerin, nystatin, potassium chloride, pravastatin, prednisone, and symbicort. and the patient is reported to be patient is compliant with medication protocol,  Medications are reported to be effective, A1C 7.9.  Regarding these diagnoses, referrals were made to the following provider(s):  n/a.  All notes on chart for PCP to review.    The patient was monitored remotely for blood  pressure; weight; blood glucose; medications; activity level.    The patient's physical needs include:  help taking medications as prescribed; physical healthcare; medication education.     The patient's mental support needs include:  needs met    The patient's cognitive support needs include:  needs met    The patient's psychosocial support needs include:  continued support    The patient's functional needs include: No data recorded    The patient's environmental needs include:  not applicable    Care Plan overall comments:  reviewed    Refer to previous outreach notes for more information on the areas listed above.    Monthly Billing Diagnoses  (E11.65) Type 2 diabetes mellitus with hyperglycemia, without long-term current use of insulin    (I10) Primary hypertension    (J44.9) End stage COPD    Medications   Medications have been reconciled    Care Plan progress this month:      Recently Modified Care Plans Updates made since 10/27/2024 12:00 AM      No recently modified care plans.            Instructions   Patient was provided an electronic copy of care plan  CCM services were explained and offered and patient has accepted these services.  Patient has given their written consent to receive CCM services and understands that this includes the authorization of electronic communication of medical information with the other treating providers.  Patient understands that they may stop CCM services at any time and these changes will be effective at the end of the calendar month and will effectively revocate the agreement of CCM services.  Patient understands that only one practitioner can furnish and be paid for CCM services during one calendar month.  Patient also understands that there may be co-payment or deductible fees in association with CCM services.  Patient will continue with at least monthly follow-up calls with the Ambulatory .    Marta CHOI  Ambulatory Case Management    11/27/2024, 10:52 EST

## 2024-12-02 NOTE — TELEPHONE ENCOUNTER
Pt called back stating he did order supplies. I advised him I was confirming because we gt a lot of spam and was faxing now. Last note faxed to [phone].

## 2024-12-10 ENCOUNTER — HOSPITAL ENCOUNTER (OUTPATIENT)
Facility: HOSPITAL | Age: 69
Discharge: HOME | End: 2024-12-10
Admitting: INTERNAL MEDICINE
Payer: MEDICARE

## 2024-12-10 DIAGNOSIS — R93.89 ABNORMAL CHEST CT: ICD-10-CM

## 2024-12-10 DIAGNOSIS — Z87.891 FORMER SMOKER: ICD-10-CM

## 2024-12-10 PROCEDURE — 71250 CT THORAX DX C-: CPT

## 2024-12-12 ENCOUNTER — TELEPHONE (OUTPATIENT)
Dept: FAMILY MEDICINE CLINIC | Facility: CLINIC | Age: 69
End: 2024-12-12
Payer: MEDICARE

## 2024-12-12 NOTE — TELEPHONE ENCOUNTER
Caller: KIM    Relationship: Other    Best call back number: [phone]  EXT [phone]    What form or medical record are you requesting: CLINICAL NOTES FROM DOS 10/14/24 WITH FRANCISCA CHILDRESS    Who is requesting this form or medical record from you: [employer]    How would you like to receive the form or medical records (pick-up, mail, fax): FAX  If fax, what is the fax number: [phone]    Timeframe paperwork needed: ASAP. REQUESTED TWICE PREVIOUSLY    Additional notes: PLEASE FAX TODAY.  THANK YOU

## 2024-12-17 NOTE — PROGRESS NOTES
Patient Name: Iker Barber  : 1955   MRN: [id number]     Chief Complaint:    Chief Complaint   Patient presents with    Primary Care Follow-Up       History of Present Illness: Iker Barber is a 69 y.o. male who is here today for follow up on emphysema, blood glucose, and other issues.  HPI        Review of Systems:   Review of Systems   Constitutional: Negative.    HENT: Negative.     Eyes: Negative.    Respiratory: Negative.     Cardiovascular: Negative.    Gastrointestinal: Negative.    Neurological: Negative.         Past Medical History:   Past Medical History:   Diagnosis Date    Anxiety     Arthritis     Asthma     Diabetes     Elevated blood pressure reading     Emphysema, unspecified     Esophageal reflux     Hearing problem     Heart disease     Hypercholesterolemia     Irritable bowel syndrome (IBS)     Peripheral artery disease     Skin disorder     Stroke (cerebrum)        Past Surgical History: No past surgical history on file.    Family History:   Family History   Problem Relation Age of Onset    Allergies Mother     Alzheimer's disease Mother     Diabetes Mother     Diabetes Father     Diabetes Brother        Social History:   Social History     Socioeconomic History    Marital status:    Tobacco Use    Smoking status: Former     Current packs/day: 0.00     Average packs/day: 1 pack/day for 40.0 years (40.0 ttl pk-yrs)     Types: Cigarettes     Start date:      Quit date:      Years since quittin.9     Passive exposure: Past    Smokeless tobacco: Never   Vaping Use    Vaping status: Never Used   Substance and Sexual Activity    Alcohol use: Not Currently    Drug use: Never    Sexual activity: Not Currently     Partners: Female       Medications:     Current Outpatient Medications:     Accu-Chek Softclix Lancets lancets, by Other route. Use as instructed, Disp: , Rfl:     albuterol (PROVENTIL) (2.5 MG/3ML) 0.083% nebulizer solution, Take 2.5 mg  by nebulization Every 4 (Four) Hours As Needed for Shortness of Air., Disp: 540 each, Rfl: 1    albuterol sulfate  (90 Base) MCG/ACT inhaler, Inhale 2 puffs Every 4 (Four) Hours As Needed for Wheezing., Disp: 8 g, Rfl: 10    aspirin 81 MG EC tablet, Take 1 tablet by mouth Daily., Disp: 90 tablet, Rfl: 3    azithromycin (ZITHROMAX) 250 MG tablet, Take 1 tablet by mouth Daily., Disp: , Rfl:     clobetasol (TEMOVATE) 0.05 % external solution, Apply  topically to the appropriate area as directed 2 (Two) Times a Day., Disp: 50 mL, Rfl: 3    Continuous Glucose Sensor (FreeStyle Bela 3 Plus Sensor) misc, Use 1 each Every 14 (Fourteen) Days., Disp: 2 each, Rfl: 0    dilTIAZem CD (Cardizem CD) 300 MG 24 hr capsule, Take 1 capsule by mouth Daily., Disp: 90 capsule, Rfl: 2    fluconazole (DIFLUCAN) 150 MG tablet, Take 1 tablet by mouth Daily., Disp: 10 tablet, Rfl: 5    furosemide (LASIX) 40 MG tablet, Take 1 tablet by mouth 2 (Two) Times a Day., Disp: 180 tablet, Rfl: 1    glucose blood (Accu-Chek Guide) test strip, 1 each by Other route Daily. Use as instructed, Disp: 100 each, Rfl: 2    Insulin Aspart Prot & Aspart (Insulin Asp Prot & Asp FlexPen) (70-30) 100 UNIT/ML suspension pen-injector, INJECT 10 UNITS UNDER THE SKIN WITH BREAKFAST AND 6 UNITS WITH SUPPER, TITRATE AS DIRECTED FOR A MAX DAILY DOSE OF 50 UNITS, Disp: 15 mL, Rfl: 1    Insulin Pen Needle (BD Pen Needle Sumi 2nd Gen) 32G X 4 MM misc, Use twice daily as directed, Disp: 200 each, Rfl: 3    ipratropium-albuterol (DUO-NEB) 0.5-2.5 mg/3 ml nebulizer, Take 3 mL by nebulization 4 (Four) Times a Day., Disp: 360 mL, Rfl: 3    losartan (COZAAR) 100 MG tablet, Take 1 tablet by mouth Daily., Disp: 90 tablet, Rfl: 1    nitroglycerin (NITROSTAT) 0.4 MG SL tablet, Place 1 tablet under the tongue Every 5 (Five) Minutes As Needed for Chest Pain., Disp: 30 tablet, Rfl: 5    nystatin (MYCOSTATIN) 100,000 unit/mL suspension, Swish and swallow 5 mL 4 (Four) Times a  "Day., Disp: 473 mL, Rfl: 5    potassium chloride (MICRO-K) 10 MEQ CR capsule, Take 1 capsule by mouth 2 (Two) Times a Day., Disp: 180 capsule, Rfl: 1    pravastatin (PRAVACHOL) 40 MG tablet, Take 1 tablet by mouth Daily., Disp: 90 tablet, Rfl: 0    predniSONE (DELTASONE) 20 MG tablet, Take 1 tablet by mouth As Needed., Disp: , Rfl:     Symbicort 160-4.5 MCG/ACT inhaler, Inhale 2 puffs 2 (Two) Times a Day., Disp: 3 each, Rfl: 5    doxycycline (ADOXA) 100 MG tablet, Take 1 tablet by mouth 2 (Two) Times a Day., Disp: 20 tablet, Rfl: 0    Allergies:   Allergies   Allergen Reactions    Methylprednisolone Confusion         Physical Exam:  Vital Signs:   Vitals:    12/18/24 1105   BP: 128/82   BP Location: Left arm   Patient Position: Sitting   Cuff Size: Adult   Pulse: 100   SpO2: 97%   Weight: 68.8 kg (151 lb 9.6 oz)   Height: 177.8 cm (70\")     Body mass index is 21.75 kg/m².     Physical Exam  Vitals and nursing note reviewed.   Constitutional:       Appearance: Normal appearance. He is normal weight.   HENT:      Head: Normocephalic and atraumatic.      Right Ear: Tympanic membrane, ear canal and external ear normal.      Left Ear: Tympanic membrane, ear canal and external ear normal.      Nose: Nose normal.      Mouth/Throat:      Mouth: Mucous membranes are dry.      Pharynx: Oropharynx is clear.   Eyes:      Extraocular Movements: Extraocular movements intact.      Conjunctiva/sclera: Conjunctivae normal.      Pupils: Pupils are equal, round, and reactive to light.   Cardiovascular:      Rate and Rhythm: Normal rate and regular rhythm.      Pulses: Normal pulses.      Heart sounds: Normal heart sounds.   Pulmonary:      Effort: Pulmonary effort is normal.      Breath sounds: Normal breath sounds.   Musculoskeletal:      Cervical back: Normal range of motion and neck supple.   Feet:      Comments:      Neurological:      Mental Status: He is alert.         Procedures      Assessment/Plan:   Diagnoses and all orders " for this visit:    1. Primary hypertension (Primary)  Assessment & Plan:  Discussed with patient to monitor their blood pressure and if systolic blood pressure goes above 140 or diastolic is above 90 to return to clinic.  Take medicines as directed, call for any problems, patient not having or any worrisome symptoms.          2. Hyperlipidemia, mixed  Assessment & Plan:  Recheck in 3 months.      3. Coronary artery disease of native artery of native heart with stable angina pectoris  Assessment & Plan:  Aggressive risk factor modification.  Patient has seen Dr. Myers      4. Type 2 diabetes mellitus with hyperglycemia, with long-term current use of insulin  Assessment & Plan:  Patient sees endocrinology.      5. Kidney lesion, native, right  Assessment & Plan:  Ultrasound showed lesions likely representing renal cysts.      6. Abnormal chest CT (Nodules, right hilar LN)  Assessment & Plan:  Patient has been seen pulmonology.  Has a small spiculated lesion in his lung field.  Follow-up pulmonology.      7. Pulmonary emphysema, unspecified emphysema type  Assessment & Plan:  Patient is an oxygen dependent COPD patient.  He is currently seen in pulmonology.  He is requesting prednisone to take on an as-needed basis.  I asked him to discuss this with pulmonology as he might be a candidate for chronic low-dose prednisone.  He is aware of the risk involved with this.      Other orders  -     furosemide (LASIX) 40 MG tablet; Take 1 tablet by mouth 2 (Two) Times a Day.  Dispense: 180 tablet; Refill: 1  -     potassium chloride (MICRO-K) 10 MEQ CR capsule; Take 1 capsule by mouth 2 (Two) Times a Day.  Dispense: 180 capsule; Refill: 1  -     doxycycline (ADOXA) 100 MG tablet; Take 1 tablet by mouth 2 (Two) Times a Day.  Dispense: 20 tablet; Refill: 0  -     nystatin (MYCOSTATIN) 100,000 unit/mL suspension; Swish and swallow 5 mL 4 (Four) Times a Day.  Dispense: 473 mL; Refill: 5             Follow Up:   Return in about 3  months (around 3/18/2025) for Annual physical, Bloodwork 1 week prior to next appointment.      Betito Bowen MD  [facility] [place] [facility]

## 2024-12-18 ENCOUNTER — OFFICE VISIT (OUTPATIENT)
Dept: FAMILY MEDICINE CLINIC | Facility: CLINIC | Age: 69
End: 2024-12-18
Payer: MEDICARE

## 2024-12-18 VITALS
OXYGEN SATURATION: 97 % | HEART RATE: 100 BPM | HEIGHT: 70 IN | WEIGHT: 151.6 LBS | SYSTOLIC BLOOD PRESSURE: 128 MMHG | DIASTOLIC BLOOD PRESSURE: 82 MMHG | BODY MASS INDEX: 21.7 KG/M2

## 2024-12-18 DIAGNOSIS — J43.9 PULMONARY EMPHYSEMA, UNSPECIFIED EMPHYSEMA TYPE: ICD-10-CM

## 2024-12-18 DIAGNOSIS — E78.2 HYPERLIPIDEMIA, MIXED: ICD-10-CM

## 2024-12-18 DIAGNOSIS — E11.65 TYPE 2 DIABETES MELLITUS WITH HYPERGLYCEMIA, WITH LONG-TERM CURRENT USE OF INSULIN: ICD-10-CM

## 2024-12-18 DIAGNOSIS — Z79.4 TYPE 2 DIABETES MELLITUS WITH HYPERGLYCEMIA, WITH LONG-TERM CURRENT USE OF INSULIN: ICD-10-CM

## 2024-12-18 DIAGNOSIS — I10 PRIMARY HYPERTENSION: Primary | ICD-10-CM

## 2024-12-18 DIAGNOSIS — I25.118 CORONARY ARTERY DISEASE OF NATIVE ARTERY OF NATIVE HEART WITH STABLE ANGINA PECTORIS: ICD-10-CM

## 2024-12-18 DIAGNOSIS — N28.9 KIDNEY LESION, NATIVE, RIGHT: ICD-10-CM

## 2024-12-18 DIAGNOSIS — R93.89 ABNORMAL CHEST CT: ICD-10-CM

## 2024-12-18 PROCEDURE — 3079F DIAST BP 80-89 MM HG: CPT | Performed by: FAMILY MEDICINE

## 2024-12-18 PROCEDURE — 3051F HG A1C>EQUAL 7.0%<8.0%: CPT | Performed by: FAMILY MEDICINE

## 2024-12-18 PROCEDURE — 1126F AMNT PAIN NOTED NONE PRSNT: CPT | Performed by: FAMILY MEDICINE

## 2024-12-18 PROCEDURE — 99214 OFFICE O/P EST MOD 30 MIN: CPT | Performed by: FAMILY MEDICINE

## 2024-12-18 PROCEDURE — 3074F SYST BP LT 130 MM HG: CPT | Performed by: FAMILY MEDICINE

## 2024-12-18 RX ORDER — FUROSEMIDE 40 MG/1
40 TABLET ORAL 2 TIMES DAILY
Qty: 180 TABLET | Refills: 1 | Status: SHIPPED | OUTPATIENT
Start: 2024-12-18

## 2024-12-18 RX ORDER — DOXYCYCLINE 100 MG/1
100 TABLET ORAL 2 TIMES DAILY
Qty: 20 TABLET | Refills: 0 | Status: SHIPPED | OUTPATIENT
Start: 2024-12-18

## 2024-12-18 RX ORDER — POTASSIUM CHLORIDE 750 MG/1
10 CAPSULE, EXTENDED RELEASE ORAL 2 TIMES DAILY
Qty: 180 CAPSULE | Refills: 1 | Status: SHIPPED | OUTPATIENT
Start: 2024-12-18

## 2024-12-18 RX ORDER — NYSTATIN 100000 [USP'U]/ML
500000 SUSPENSION ORAL 4 TIMES DAILY
Qty: 473 ML | Refills: 5 | Status: SHIPPED | OUTPATIENT
Start: 2024-12-18

## 2024-12-18 RX ORDER — AZITHROMYCIN 250 MG/1
250 TABLET, FILM COATED ORAL DAILY
Status: CANCELLED | OUTPATIENT
Start: 2024-12-18

## 2024-12-18 RX ORDER — PREDNISONE 20 MG/1
20 TABLET ORAL DAILY
Qty: 30 TABLET | Refills: 1 | Status: CANCELLED | OUTPATIENT
Start: 2024-12-18

## 2024-12-18 RX ORDER — AZITHROMYCIN 250 MG/1
250 TABLET, FILM COATED ORAL DAILY
COMMUNITY

## 2024-12-18 NOTE — ASSESSMENT & PLAN NOTE
Patient has been seen pulmonology.  Has a small spiculated lesion in his lung field.  Follow-up pulmonology.

## 2024-12-18 NOTE — ASSESSMENT & PLAN NOTE
Patient is an oxygen dependent COPD patient.  He is currently seen in pulmonology.  He is requesting prednisone to take on an as-needed basis.  I asked him to discuss this with pulmonology as he might be a candidate for chronic low-dose prednisone.  He is aware of the risk involved with this.

## 2024-12-19 ENCOUNTER — OFFICE VISIT (OUTPATIENT)
Dept: PULMONOLOGY | Facility: CLINIC | Age: 69
End: 2024-12-19
Payer: MEDICARE

## 2024-12-19 VITALS
WEIGHT: 150 LBS | HEIGHT: 70 IN | HEART RATE: 84 BPM | SYSTOLIC BLOOD PRESSURE: 114 MMHG | BODY MASS INDEX: 21.47 KG/M2 | OXYGEN SATURATION: 92 % | DIASTOLIC BLOOD PRESSURE: 64 MMHG | TEMPERATURE: 98.9 F

## 2024-12-19 DIAGNOSIS — R93.89 ABNORMAL CHEST CT: Primary | ICD-10-CM

## 2024-12-19 DIAGNOSIS — Z87.891 FORMER SMOKER: ICD-10-CM

## 2024-12-19 DIAGNOSIS — J44.9 COPD MIXED TYPE: ICD-10-CM

## 2024-12-19 DIAGNOSIS — J96.11 CHRONIC RESPIRATORY FAILURE WITH HYPOXIA AND HYPERCAPNIA: ICD-10-CM

## 2024-12-19 DIAGNOSIS — J96.12 CHRONIC RESPIRATORY FAILURE WITH HYPOXIA AND HYPERCAPNIA: ICD-10-CM

## 2024-12-19 RX ORDER — FUROSEMIDE 40 MG/1
40 TABLET ORAL 2 TIMES DAILY
Qty: 180 TABLET | Refills: 1 | OUTPATIENT
Start: 2024-12-19

## 2024-12-19 NOTE — PROGRESS NOTES
PULMONARY  NOTE    Chief Complaint     Stage IV COPD, former smoker, coronary artery disease, abnormal CT scan of the chest, chronic mixed hypoxic and hypercarbic respiratory failure, frequent exacerbations of COPD    History of Present Illness     69-year-old male returns today for follow-up  I last saw him 10/10/2024    He has a history of tobacco abuse between ages 13 and 64    He has stage IV chronic obstructive pulmonary disease  Since I last saw him he has had an exacerbation of symptoms and completed a course of antibiotics  He had a 5-day course of prednisone which helped a little bit but he still wheezing    He remains on the same bronchodilator regimen    He has been on suppressive azithromycin on a Monday, Wednesday, and Friday schedule    He has chronic mixed hypoxic and hypercarbic respiratory failure  A trial of noninvasive positive pressure ventilation in the past was not well-tolerated  Use of supplemental oxygen 24 hours a day    We have discussed lung transplantation in the past    He underwent a CT scan of the chest on 12/10/2024  Results are as noted below    Patient Active Problem List   Diagnosis    Type 2 diabetes mellitus with hyperglycemia, with long-term current use of insulin    Primary hypertension    Medicare annual wellness visit, subsequent    Bilateral leg edema    Loss of weight    Hyperlipidemia, mixed    Dysuria    Candida infection, oral    Coronary artery disease of native artery with stable angina pectoris    Shortness of breath    Kidney lesion, native, right    Former smoker (Stopped ~5 years)    Chronic respiratory failure with hypoxia and hypercapnia    Abnormal chest CT (Nodules, right hilar LN)    Stage IV (Very severe) Emphysema      Allergies   Allergen Reactions    Methylprednisolone Confusion       Current Outpatient Medications:     Accu-Chek Softclix Lancets lancets, by Other route. Use as instructed, Disp: , Rfl:     albuterol (PROVENTIL) (2.5 MG/3ML) 0.083%  nebulizer solution, Take 2.5 mg by nebulization Every 4 (Four) Hours As Needed for Shortness of Air., Disp: 540 each, Rfl: 1    albuterol sulfate  (90 Base) MCG/ACT inhaler, Inhale 2 puffs Every 4 (Four) Hours As Needed for Wheezing., Disp: 8 g, Rfl: 10    aspirin 81 MG EC tablet, Take 1 tablet by mouth Daily., Disp: 90 tablet, Rfl: 3    azithromycin (ZITHROMAX) 250 MG tablet, Take 1 tablet by mouth Daily., Disp: , Rfl:     clobetasol (TEMOVATE) 0.05 % external solution, Apply  topically to the appropriate area as directed 2 (Two) Times a Day., Disp: 50 mL, Rfl: 3    Continuous Glucose Sensor (FreeStyle Bela 3 Plus Sensor) misc, Use 1 each Every 14 (Fourteen) Days., Disp: 2 each, Rfl: 0    dilTIAZem CD (Cardizem CD) 300 MG 24 hr capsule, Take 1 capsule by mouth Daily., Disp: 90 capsule, Rfl: 2    fluconazole (DIFLUCAN) 150 MG tablet, Take 1 tablet by mouth Daily., Disp: 10 tablet, Rfl: 5    furosemide (LASIX) 40 MG tablet, Take 1 tablet by mouth 2 (Two) Times a Day., Disp: 180 tablet, Rfl: 1    glucose blood (Accu-Chek Guide) test strip, 1 each by Other route Daily. Use as instructed, Disp: 100 each, Rfl: 2    Insulin Aspart Prot & Aspart (Insulin Asp Prot & Asp FlexPen) (70-30) 100 UNIT/ML suspension pen-injector, INJECT 10 UNITS UNDER THE SKIN WITH BREAKFAST AND 6 UNITS WITH SUPPER, TITRATE AS DIRECTED FOR A MAX DAILY DOSE OF 50 UNITS, Disp: 15 mL, Rfl: 1    Insulin Pen Needle (BD Pen Needle Sumi 2nd Gen) 32G X 4 MM misc, Use twice daily as directed, Disp: 200 each, Rfl: 3    ipratropium-albuterol (DUO-NEB) 0.5-2.5 mg/3 ml nebulizer, Take 3 mL by nebulization 4 (Four) Times a Day., Disp: 360 mL, Rfl: 3    losartan (COZAAR) 100 MG tablet, Take 1 tablet by mouth Daily., Disp: 90 tablet, Rfl: 1    nitroglycerin (NITROSTAT) 0.4 MG SL tablet, Place 1 tablet under the tongue Every 5 (Five) Minutes As Needed for Chest Pain., Disp: 30 tablet, Rfl: 5    nystatin (MYCOSTATIN) 100,000 unit/mL suspension, Swish and  "swallow 5 mL 4 (Four) Times a Day., Disp: 473 mL, Rfl: 5    potassium chloride (MICRO-K) 10 MEQ CR capsule, Take 1 capsule by mouth 2 (Two) Times a Day., Disp: 180 capsule, Rfl: 1    pravastatin (PRAVACHOL) 40 MG tablet, Take 1 tablet by mouth Daily., Disp: 90 tablet, Rfl: 0    predniSONE (DELTASONE) 20 MG tablet, Take 1 tablet by mouth As Needed., Disp: , Rfl:     Symbicort 160-4.5 MCG/ACT inhaler, Inhale 2 puffs 2 (Two) Times a Day., Disp: 3 each, Rfl: 5    doxycycline (ADOXA) 100 MG tablet, Take 1 tablet by mouth 2 (Two) Times a Day. (Patient not taking: Reported on 2024), Disp: 20 tablet, Rfl: 0  MEDICATION LIST AND ALLERGIES REVIEWED.    Family History   Problem Relation Age of Onset    Allergies Mother     Alzheimer's disease Mother     Diabetes Mother     Diabetes Father     Diabetes Brother      Social History     Tobacco Use    Smoking status: Former     Current packs/day: 0.00     Average packs/day: 1 pack/day for 40.0 years (40.0 ttl pk-yrs)     Types: Cigarettes     Start date:      Quit date:      Years since quittin.9     Passive exposure: Past    Smokeless tobacco: Never   Vaping Use    Vaping status: Never Used   Substance Use Topics    Alcohol use: Not Currently    Drug use: Never     Social History     Social History Narrative    Not on file     FAMILY AND SOCIAL HISTORY REVIEWED.    Review of Systems  IF PRESENT REFER TO SCANNED ROS SHEET FROM SAME DATE  OTHERWISE ROS OBTAINED AND NON-CONTRIBUTORY OVER HPI.    /64   Pulse 84   Temp 98.9 °F (37.2 °C) (Infrared)   Ht 177.8 cm (70\")   Wt 68 kg (150 lb)   SpO2 92% Comment: 6L O2  BMI 21.52 kg/m²   Physical Exam  Vitals and nursing note reviewed.   Constitutional:       General: He is not in acute distress.     Appearance: He is well-developed. He is not diaphoretic.   HENT:      Head: Normocephalic and atraumatic.   Neck:      Thyroid: No thyromegaly.   Cardiovascular:      Rate and Rhythm: Normal rate and regular " rhythm.      Heart sounds: Normal heart sounds. No murmur heard.  Pulmonary:      Effort: Pulmonary effort is normal.      Breath sounds: No stridor.      Comments: Decreased breath sounds bilaterally with scattered expiratory wheezes  Lymphadenopathy:      Cervical: No cervical adenopathy.      Upper Body:      Right upper body: No supraclavicular or epitrochlear adenopathy.      Left upper body: No supraclavicular or epitrochlear adenopathy.   Skin:     General: Skin is warm and dry.   Neurological:      Mental Status: He is alert and oriented to person, place, and time.   Psychiatric:         Behavior: Behavior normal.         Results     CT scan of the chest from 12/10/2024 reviewed on PACS  My interpretation as noted below      There is no immunization history on file for this patient.  Problem List       ICD-10-CM ICD-9-CM   1. Abnormal chest CT (Nodules, right hilar LN)  R93.89 793.2   2. Chronic respiratory failure with hypoxia and hypercapnia  J96.11 518.83    J96.12 799.02     786.09   3. Former smoker (Stopped ~5 years)  Z87.891 V15.82   4. Stage IV (Very severe) Emphysema  J44.9 496       Discussion     We reviewed his chest imaging together on PACS  He has a new pleural-based nodule in the right upper lobe  Diffuse emphysematous changes are again redemonstrated  A mediastinal lymph node has shown interval decrease in size.    I have recommended a 3-month follow-up CT scan of the chest  If the nodule continues to enlarge then I would suggest a PET scan  If the nodule is hypermetabolic and no evidence of abnormal hypermetabolic elsewhere then would consider empiric SBRT  Given the severity of his lung disease he is not a candidate for surgery nor is he a candidate for a biopsy procedure.    We reviewed therapy for his advanced stage COPD and chronic hypercarbic respiratory failure  We again discussed noninvasive positive pressure ventilation  He has not tolerated it in the past    There is a new inhaled  phosphodiesterase inhibitor that has been released  Will see if we can get that approved and can give that a trial if its affordable    Would continue with suppressive azithromycin on a Monday, Wednesday, and Friday schedule    Given his persistent wheezing I am going to put him on a tapering course of prednisone    I will plan to see him back in 3 months after his repeat CT scan of the chest    This visit represents an established relationship with whom the provider is providing ongoing longitudinal care related to serious and/or complex conditions    Level of service justified based on 45 minutes spent in patient care on this date of service including, but not limited to: preparing to see the patient, obtaining and/or reviewing history, performing medically appropriate examination, ordering tests/medicine/procedures, independently interpreting results, documenting clinical information in EHR, and counseling/education of patient/family/caregiver (excluding time spent on other separate services such as performing procedures or test interpretation, if applicable). (Level 4 30-39 minutes; Level 5 40-54 minutes)    Kee Nguyen MD  Note electronically signed    CC: Betito Bowen MD

## 2024-12-20 ENCOUNTER — TELEPHONE (OUTPATIENT)
Dept: PULMONOLOGY | Facility: CLINIC | Age: 69
End: 2024-12-20
Payer: MEDICARE

## 2024-12-20 DIAGNOSIS — E11.65 TYPE 2 DIABETES MELLITUS WITH HYPERGLYCEMIA, WITH LONG-TERM CURRENT USE OF INSULIN: ICD-10-CM

## 2024-12-20 DIAGNOSIS — Z79.4 TYPE 2 DIABETES MELLITUS WITH HYPERGLYCEMIA, WITH LONG-TERM CURRENT USE OF INSULIN: ICD-10-CM

## 2024-12-20 RX ORDER — PREDNISONE 10 MG/1
TABLET ORAL
Qty: 30 TABLET | Refills: 0 | Status: SHIPPED | OUTPATIENT
Start: 2024-12-20

## 2024-12-20 RX ORDER — INSULIN ASPART 100 [IU]/ML
INJECTION, SUSPENSION SUBCUTANEOUS
Qty: 15 ML | Refills: 3 | Status: SHIPPED | OUTPATIENT
Start: 2024-12-20

## 2024-12-20 NOTE — TELEPHONE ENCOUNTER
Pt called stating  was going to put him on a tapering dose of prednisone but forgot to put the order in. It is going to the [facility] in [place]. Was asking if you could order it.

## 2024-12-20 NOTE — TELEPHONE ENCOUNTER
"Pt called back waiting to see when Prednisone will be sent to pharmacy. Per office note from Dr Nguyen-\"given his persistent wheezing I am going to put him on a tapering course of prednisone\". Filled Prednisone per chart via fax sent to [facility]. Pt notified and verbalized appreciation.      "

## 2024-12-20 NOTE — TELEPHONE ENCOUNTER
Called the pt and he is currently taking 20 units in the am and 8 units in the pm. He is taking steroids at this time.    I told him to watch his carbs, sugars (foods and drinks) and to drink plenty of water. He verbalized understanding.  Script is pending.    Last office visit with prescribing clinician: 10/16/2024       Next office visit with prescribing clinician: 5/21/2025     {

## 2024-12-20 NOTE — TELEPHONE ENCOUNTER
PATIENT TAKES NOVOLOG 70/30 SAID BECAUSE OF OTHER CONDITIONS HE NEEDS A HIGHER DOSAGE OF NOVOLOG HE SAYS HE WOULD BE USING 12 EXTRA UNITS A DAY SO HE NEEDS IT REWRITTEN FOR A HIGHER DOSAGE. THE PATIENT WAS ALSO WANTING TO KNOW IF THERE WAS ANY OTHER MEDICATION HE COULD USE. WOULD LIKE A CALL BACK [phone]

## 2025-01-13 ENCOUNTER — PATIENT OUTREACH (OUTPATIENT)
Dept: CASE MANAGEMENT | Facility: OTHER | Age: 70
End: 2025-01-13
Payer: MEDICARE

## 2025-01-13 DIAGNOSIS — E11.65 TYPE 2 DIABETES MELLITUS WITH HYPERGLYCEMIA, WITHOUT LONG-TERM CURRENT USE OF INSULIN: Primary | ICD-10-CM

## 2025-01-13 DIAGNOSIS — I10 PRIMARY HYPERTENSION: ICD-10-CM

## 2025-01-13 DIAGNOSIS — J44.9 END STAGE COPD: ICD-10-CM

## 2025-01-13 NOTE — OUTREACH NOTE
AMBULATORY CASE MANAGEMENT NOTE    Names and Relationships of Patient/Support Persons: Contact: Sunil, Iker Ponce; Relationship: Self -     CCM Interim Update    Spoke with Mr. Barber for CCM update. He has not been feeling the best but states he is some better. He states that he had his recent repeat CT scan and is aware there is a new nodule. He has a good grasp on what the treatment plan would be. He is aware that he is not a good surgery candidate including for biopsy.  He is now off of his prednisone.     His edema is being managed much better now that he is taking the additional lasix. He is also taking his potassium. He does have esome mild chest pain at times.    Since he has been on the prednisone, his blood sugars have been more difficult to control. While he was taking the prednisone he followed the prednisone protocol. He is back to his usual dose 10U in morning and 6 at supper. His blood sugar is still higher than where it was. This morning he had eggs, morales and a piece of toast. Blood sugar is 220 3 hours after eating. He has upcoming appointment February 19 with rainer and will discuss at that time.     Mr. Barber does have advanced directives. His wife is to be his HCS. He states he does not want heroic measures taken but that he has a 3 month plan written out in his living will. He does wish to be an organ donor but does realize this may be limited. Requested copy of living will for his chart so that we can assist to honor his wishes.     Education Documentation  No documentation found.        Marta CHOI  Ambulatory Case Management    1/13/2025, 10:52 EST

## 2025-01-30 ENCOUNTER — PATIENT OUTREACH (OUTPATIENT)
Dept: CASE MANAGEMENT | Facility: OTHER | Age: 70
End: 2025-01-30
Payer: MEDICARE

## 2025-01-30 DIAGNOSIS — E11.65 TYPE 2 DIABETES MELLITUS WITH HYPERGLYCEMIA, WITHOUT LONG-TERM CURRENT USE OF INSULIN: Primary | ICD-10-CM

## 2025-01-30 DIAGNOSIS — I10 PRIMARY HYPERTENSION: ICD-10-CM

## 2025-01-30 DIAGNOSIS — J44.9 END STAGE COPD: ICD-10-CM

## 2025-01-30 NOTE — OUTREACH NOTE
[facility] End of Month Documentation    This Chronic Medical Management Care Plan for Iker Barber, 69 y.o. male, has been reviewed; monitored and managed and a new plan of care implemented for the month of January.  A cumulative time of 31  minutes was spent on this patient record this month, including chart review; phone call with patient.    Regarding the patient's problems: has Type 2 diabetes mellitus with hyperglycemia, with long-term current use of insulin; Primary hypertension; Medicare annual wellness visit, subsequent; Bilateral leg edema; Loss of weight; Hyperlipidemia, mixed; Dysuria; Candida infection, oral; Coronary artery disease of native artery with stable angina pectoris; Shortness of breath; Kidney lesion, native, right; Former smoker (Stopped ~5 years); Chronic respiratory failure with hypoxia and hypercapnia; Abnormal chest CT (Nodules, right hilar LN); and Stage IV (Very severe) Emphysema on their problem list., the following items were addressed: medical records; medications and any changes can be found within the plan section of the note.  A detailed listing of time spent for chronic care management is tracked within each outreach encounter.  Current medications include:  has a current medication list which includes the following prescription(s): accu-chek softclix lancets, albuterol, albuterol sulfate hfa, aspirin, azithromycin, clobetasol, freestyle zacarias 3 plus sensor, diltiazem cd, doxycycline, fluconazole, furosemide, accu-chek guide, novolog mix 70/30 flexpen, bd pen needle mansi 2nd gen, ipratropium-albuterol, losartan, nitroglycerin, nystatin, potassium chloride, pravastatin, prednisone, prednisone, and symbicort. and the patient is reported to be patient is compliant with medication protocol,  Medications are reported to be non-effective in controlling symptoms and changes have been made to the medication protocol, blood sugars are running higher following prednisone use.   Regarding these diagnoses, referrals were made to the following provider(s):  n/a.  All notes on chart for PCP to review.    The patient was monitored remotely for blood pressure; weight; blood glucose; medications; activity level.    The patient's physical needs include:  help taking medications as prescribed; physical healthcare; medication education.     The patient's mental support needs include:  needs met    The patient's cognitive support needs include:  needs met    The patient's psychosocial support needs include:  continued support    The patient's functional needs include: physical healthcare    The patient's environmental needs include:  not applicable    Care Plan overall comments:  reviewed    Refer to previous outreach notes for more information on the areas listed above.    Monthly Billing Diagnoses  (E11.65) Type 2 diabetes mellitus with hyperglycemia, without long-term current use of insulin    (I10) Primary hypertension    (J44.9) End stage COPD    Medications   Medications have been reconciled    Care Plan progress this month:      Recently Modified Care Plans Updates made since 12/30/2024 12:00 AM      No recently modified care plans.            Instructions   Patient was provided an electronic copy of care plan  CCM services were explained and offered and patient has accepted these services.  Patient has given their written consent to receive CCM services and understands that this includes the authorization of electronic communication of medical information with the other treating providers.  Patient understands that they may stop CCM services at any time and these changes will be effective at the end of the calendar month and will effectively revocate the agreement of CCM services.  Patient understands that only one practitioner can furnish and be paid for CCM services during one calendar month.  Patient also understands that there may be co-payment or deductible fees in association with CCM  services.  Patient will continue with at least monthly follow-up calls with the Ambulatory .    Marta CHOI  Ambulatory Case Management    1/30/2025, 14:52 EST

## 2025-02-24 ENCOUNTER — PATIENT OUTREACH (OUTPATIENT)
Dept: CASE MANAGEMENT | Facility: OTHER | Age: 70
End: 2025-02-24
Payer: MEDICARE

## 2025-02-24 DIAGNOSIS — J44.9 END STAGE COPD: ICD-10-CM

## 2025-02-24 DIAGNOSIS — E11.65 TYPE 2 DIABETES MELLITUS WITH HYPERGLYCEMIA, WITHOUT LONG-TERM CURRENT USE OF INSULIN: Primary | ICD-10-CM

## 2025-02-24 DIAGNOSIS — I10 PRIMARY HYPERTENSION: ICD-10-CM

## 2025-02-24 NOTE — OUTREACH NOTE
AMBULATORY CASE MANAGEMENT NOTE    Names and Relationships of Patient/Support Persons: Contact: Iker Barber; Relationship: Self -     CCM Interim Update    Received call from Mr. Barber. He spoke with Sara this weekend. The person he spoke with stated that they would cancel his order. He is concerned that his next scheduled sensor order for his zacarias was cancelled. Call placed on his behalf to Sara [phone] and spoke with representative. What they were calling about was to make sure that he has a reader device and when he got it. Medicare requires that patient's have a reader device even if they use their smart phone. His sensors are scheduled to ship out on 3/4/25 and will take a few days for him to receive. He has also had trouble with [employer] delivering his sensors to the wrong door. Asked Sara west if they could request that they be delivered to his front door instead of the garage door. She did put that request in for delivery. Made Mr. Barber aware of the above.     Mr. Barber is now off of the prednisone. His blood sugar average continues to be high but lower than it was.    Last 7 day Average 219  Last 14 days Average 226  Last 30 days  Average 221  Last 3 months Average 221.    Currently his blood sugar is 184. He reports that he eats one piece of toast with breakfast and that raises his blood sugar into 200's. Then, due to his weight, he snacks throughout the day. By 4 pm it has usually come down into the upper 100's. He raises it up before bed due to history of lows.  He recently has been as low as 50. Even with raising it up most mornings he is 120-150.     He reports with use of his diuretic that his swelling is controlled. He states that his breathing is bad. He is currently using 5L of o2 and his nebulizer. He has a repeat CT coming up 3/20. This is to check the small spiculated area found on last CT. The doctor talked with him about the possibility of removing  it with a laser. He isn't sure how he feels about that. Mr. Barber expressed that if it was larger and they recommended treatment that he would  not want to do that.       Education Documentation  No documentation found.        Marta CHIO  Ambulatory Case Management    2/24/2025, 14:03 EST

## 2025-02-26 ENCOUNTER — OFFICE VISIT (OUTPATIENT)
Dept: CARDIOLOGY | Facility: CLINIC | Age: 70
End: 2025-02-26
Payer: MEDICARE

## 2025-02-26 VITALS
HEART RATE: 104 BPM | BODY MASS INDEX: 20.41 KG/M2 | SYSTOLIC BLOOD PRESSURE: 108 MMHG | WEIGHT: 142.6 LBS | OXYGEN SATURATION: 92 % | HEIGHT: 70 IN | DIASTOLIC BLOOD PRESSURE: 54 MMHG

## 2025-02-26 DIAGNOSIS — I25.118 CORONARY ARTERY DISEASE OF NATIVE ARTERY OF NATIVE HEART WITH STABLE ANGINA PECTORIS: Primary | Chronic | ICD-10-CM

## 2025-02-26 DIAGNOSIS — I10 PRIMARY HYPERTENSION: Chronic | ICD-10-CM

## 2025-02-26 DIAGNOSIS — E78.2 HYPERLIPIDEMIA, MIXED: Chronic | ICD-10-CM

## 2025-02-26 PROCEDURE — 99214 OFFICE O/P EST MOD 30 MIN: CPT | Performed by: INTERNAL MEDICINE

## 2025-02-26 PROCEDURE — 3078F DIAST BP <80 MM HG: CPT | Performed by: INTERNAL MEDICINE

## 2025-02-26 PROCEDURE — 3074F SYST BP LT 130 MM HG: CPT | Performed by: INTERNAL MEDICINE

## 2025-02-26 NOTE — PROGRESS NOTES
OFFICE VISIT  NOTE  [facility] CARDIOLOGY      Name: Iker Barber    Date: 2025  MRN:  [id number]  :  1955      REFERRING/PRIMARY PROVIDER:  Betito Bowen MD    Chief Complaint   Patient presents with    Coronary Artery Disease    Chest Pain    Dizziness    Shortness of Breath    Bilateral leg edema       HPI: Iker Barber is a 69 y.o. male who presents for chest pain.  History of severe oxygen dependent COPD, hypertension, hyperlipidemia and lower extremity edema.  Evaluated by Dr. Starr 2024, recommended cardiac catheterization.  Troponin mildly elevated at 39 at that time.  Echo 4/3/2024 showed EF 69%, grade 1 diastolic dysfunction, normal RVSP, no significant valve disease.  Recent [facility] ICU stay for severe hypoxic respiratory failure COPD exacerbation early 2024.  He reports stable shortness of breath recently he is on 5 L nasal cannula has a 50-pack-year smoking history quit in .  Due to left-sided chest pain we performed coronary CTA 2024 which showed mid LAD 50 to 69% stenosis with an FFR CT value of 0.88 therefore nonsignificant cardiac cath was not necessary.  He gets occasional chest pain, usually at rest lasting a few seconds, sometimes takes a nitroglycerin which relieves it.  Not exertional and not particularly severe.  FFR of the RCA was 0.95 and 0.97 in the circumflex.    Past Medical History:   Diagnosis Date    Anxiety     Arthritis     Asthma     Diabetes     Elevated blood pressure reading     Emphysema, unspecified     Esophageal reflux     Hearing problem     Heart disease     Hypercholesterolemia     Irritable bowel syndrome (IBS)     Peripheral artery disease     Skin disorder     Stroke (cerebrum)        History reviewed. No pertinent surgical history.    Social History     Socioeconomic History    Marital status:    Tobacco Use    Smoking status: Former     Current packs/day: 0.00     Average packs/day: 1  pack/day for 40.0 years (40.0 ttl pk-yrs)     Types: Cigarettes     Start date:      Quit date:      Years since quittin.1     Passive exposure: Past    Smokeless tobacco: Never   Vaping Use    Vaping status: Never Used   Substance and Sexual Activity    Alcohol use: Not Currently    Drug use: Never    Sexual activity: Not Currently     Partners: Female       Family History   Problem Relation Age of Onset    Allergies Mother     Alzheimer's disease Mother     Diabetes Mother     Diabetes Father     Diabetes Brother         ROS:   Constitutional no fever,  no weight loss   Skin no rash, no subcutaneous nodules   Otolaryngeal no difficulty swallowing   Cardiovascular See HPI   Pulmonary no cough, no sputum production   Gastrointestinal no constipation, no diarrhea   Genitourinary no dysuria, no hematuria   Hematologic no easy bruisability, no abnormal bleeding   Musculoskeletal no muscle pain   Neurologic no dizziness, no falls         Allergies   Allergen Reactions    Methylprednisolone Confusion         Current Outpatient Medications:     Accu-Chek Softclix Lancets lancets, by Other route. Use as instructed, Disp: , Rfl:     albuterol (PROVENTIL) (2.5 MG/3ML) 0.083% nebulizer solution, Take 2.5 mg by nebulization Every 4 (Four) Hours As Needed for Shortness of Air., Disp: 540 each, Rfl: 1    albuterol sulfate  (90 Base) MCG/ACT inhaler, Inhale 2 puffs Every 4 (Four) Hours As Needed for Wheezing., Disp: 8 g, Rfl: 10    aspirin 81 MG EC tablet, Take 1 tablet by mouth Daily., Disp: 90 tablet, Rfl: 3    azithromycin (ZITHROMAX) 250 MG tablet, Take 1 tablet by mouth Daily., Disp: , Rfl:     clobetasol (TEMOVATE) 0.05 % external solution, Apply  topically to the appropriate area as directed 2 (Two) Times a Day., Disp: 50 mL, Rfl: 3    Continuous Glucose Sensor (FreeStyle Bela 3 Plus Sensor) misc, Use 1 each Every 14 (Fourteen) Days., Disp: 2 each, Rfl: 0    dilTIAZem CD (Cardizem CD) 300 MG 24 hr  capsule, Take 1 capsule by mouth Daily., Disp: 90 capsule, Rfl: 2    doxycycline (ADOXA) 100 MG tablet, Take 1 tablet by mouth 2 (Two) Times a Day. (Patient taking differently: Take 1 tablet by mouth 2 (Two) Times a Day. Pt uses when he needs it on Standby), Disp: 20 tablet, Rfl: 0    fluconazole (DIFLUCAN) 150 MG tablet, Take 1 tablet by mouth Daily., Disp: 10 tablet, Rfl: 5    furosemide (LASIX) 40 MG tablet, Take 1 tablet by mouth 2 (Two) Times a Day., Disp: 180 tablet, Rfl: 1    glucose blood (Accu-Chek Guide) test strip, 1 each by Other route Daily. Use as instructed, Disp: 100 each, Rfl: 2    insulin aspart prot & aspart (NovoLOG Mix 70/30 FlexPen) (70-30) 100 UNIT/ML suspension pen-injector injection, Inject 20 units with breakfast and 8 units with supper, titrate for max daily dose of 50 units, Disp: 15 mL, Rfl: 3    Insulin Pen Needle (BD Pen Needle Sumi 2nd Gen) 32G X 4 MM misc, Use twice daily as directed, Disp: 200 each, Rfl: 3    ipratropium-albuterol (DUO-NEB) 0.5-2.5 mg/3 ml nebulizer, Take 3 mL by nebulization 4 (Four) Times a Day., Disp: 360 mL, Rfl: 3    losartan (COZAAR) 100 MG tablet, Take 1 tablet by mouth Daily., Disp: 90 tablet, Rfl: 1    nitroglycerin (NITROSTAT) 0.4 MG SL tablet, Place 1 tablet under the tongue Every 5 (Five) Minutes As Needed for Chest Pain., Disp: 30 tablet, Rfl: 5    nystatin (MYCOSTATIN) 100,000 unit/mL suspension, Swish and swallow 5 mL 4 (Four) Times a Day., Disp: 473 mL, Rfl: 5    O2 (OXYGEN), Inhale 5 L/min Continuous., Disp: , Rfl:     potassium chloride (MICRO-K) 10 MEQ CR capsule, Take 1 capsule by mouth 2 (Two) Times a Day., Disp: 180 capsule, Rfl: 1    pravastatin (PRAVACHOL) 40 MG tablet, Take 1 tablet by mouth Daily., Disp: 90 tablet, Rfl: 0    predniSONE (DELTASONE) 10 MG tablet, Take 4 tabs daily x 3 days, then take 3 tabs daily x 3 days, then take 2 tabs daily x 3 days, then take 1 tab daily x 3 days, Disp: 30 tablet, Rfl: 0    predniSONE (DELTASONE) 20 MG  "tablet, Take 1 tablet by mouth As Needed., Disp: , Rfl:     Symbicort 160-4.5 MCG/ACT inhaler, Inhale 2 puffs 2 (Two) Times a Day., Disp: 3 each, Rfl: 5    Vitals:    02/26/25 1118   BP: 108/54   BP Location: Left arm   Patient Position: Sitting   Pulse: 104   SpO2: 92%   Weight: 64.7 kg (142 lb 9.6 oz)   Height: 177.8 cm (70\")     Body mass index is 20.46 kg/m².    PHYSICAL EXAM:    General Appearance:   · well developed  · well nourished, chronically ill-appearing, on continuous oxygen therapy.  Neck:  · thyroid not enlarged  · supple  Respiratory:  · no respiratory distress  · Increased expiratory phase  Cardiovascular:  · no jugular venous distention  · regular rhythm  · apical impulse normal  · S1 normal, S2 normal  · no S3, no S4   · no murmur  · no rub, no thrill  · carotid pulses normal; no bruit  · pedal pulses normal  · lower extremity edema: none    Skin:   warm, dry    RESULTS:   Procedures    Results for orders placed in visit on 04/03/24    Adult Transthoracic Echo Complete W/ Cont if Necessary Per Protocol    Interpretation Summary    Left ventricular systolic function is hyperdynamic (EF > 70%). Calculated left ventricular EF = 69% Left ventricular ejection fraction appears to be greater than 70%.    Left ventricular diastolic function is consistent with (grade I) impaired relaxation.    Aortic valve area is 2.8 cm2. grossly normal valve    Peak velocity of the flow distal to the aortic valve is 123.3 cm/s. Aortic valve maximum pressure gradient is 6 mmHg. Aortic valve mean pressure gradient is 3 mmHg.    The mitral valve peak gradient is 4 mmHg. The mitral valve mean gradient is 2 mmHg. The mitral valve area (PHT) is 3.8 cm2.Normal valves    Estimated right ventricular systolic pressure from tricuspid regurgitation is normal (<35 mmHg). Calculated right ventricular systolic pressure from tricuspid regurgitation is 14 mmHg.    The aortic root measures 2.6 cm.    Normal RV size and function    No " "pericardial effusion    Limited parasternal windows        Labs:  Lab Results   Component Value Date    TRIG 89 09/10/2024    HDL 82 09/10/2024    LDL 69 09/10/2024    AST 12 09/10/2024    ALT 16 09/10/2024     Lab Results   Component Value Date    HGBA1C 7.2 (H) 09/10/2024     No components found for: \"CREATINININE\"  No results found for: \"EGFRIFNONA\"    Most recent PCP note, imaging tests, and labs reviewed.    ASSESSMENT:  Problem List Items Addressed This Visit       Primary hypertension (Chronic)    Hyperlipidemia, mixed (Chronic)    Coronary artery disease of native artery with stable angina pectoris - Primary (Chronic)       PLAN:    1.  CAD:  Coronary CTA 9/2024 showed mid LAD 50 to 69% stenosis with an FFR CT value 0.88 therefore cath deferred  Stable symptoms currently if he has increasing chest pain we may try Ranexa due to low normal blood pressure.  Continue sublingual nitroglycerin refilled today    Continue medical therapy with aspirin and statin.    Continue as needed nitroglycerin, with instructions to go to the ER if any persistent chest pain occurs.    2.  Severe COPD:  Oxygen dependent, increases risk of cardiac catheterization but will minimize sedation.  Has appointment with Dr. Ron Nguyen with [facility] pulmonary.    3.  Diabetes mellitus type 2:  We discussed importance of good glycemic control to prevent further cardiovascular disease.  Continue ARB    4.  Primary hypertension:  Goal blood pressure less than 130/80  Well-controlled today, continue current medical therapy.    Advance Care Planning   ACP discussion was held with the patient during this visit. Patient has an advance directive (not in EMR), copy requested.         Return to clinic in 9 months, or sooner as needed.    Thank you for the opportunity to share in the care of your patient; please do not hesitate to call me with any questions.     Son Myers MD, [facility], [facility]  Office: [phone]  [address] [place] [address] " [address]  [address] [place] [id number]    02/26/25

## 2025-02-27 ENCOUNTER — PATIENT OUTREACH (OUTPATIENT)
Dept: CASE MANAGEMENT | Facility: OTHER | Age: 70
End: 2025-02-27
Payer: MEDICARE

## 2025-02-27 DIAGNOSIS — E11.65 TYPE 2 DIABETES MELLITUS WITH HYPERGLYCEMIA, WITHOUT LONG-TERM CURRENT USE OF INSULIN: Primary | ICD-10-CM

## 2025-02-27 DIAGNOSIS — J44.9 END STAGE COPD: ICD-10-CM

## 2025-02-27 DIAGNOSIS — I10 PRIMARY HYPERTENSION: ICD-10-CM

## 2025-02-27 NOTE — OUTREACH NOTE
[facility] End of Month Documentation    This Chronic Medical Management Care Plan for Iker Barber, 69 y.o. male, has been reviewed; monitored and managed and a new plan of care implemented for the month of February.  A cumulative time of 30  minutes was spent on this patient record this month, including chart review; phone call with patient; phone call with other providers, Sara.    Regarding the patient's problems: has Type 2 diabetes mellitus with hyperglycemia, with long-term current use of insulin; Primary hypertension; Medicare annual wellness visit, subsequent; Bilateral leg edema; Loss of weight; Hyperlipidemia, mixed; Dysuria; Candida infection, oral; Coronary artery disease of native artery with stable angina pectoris; Shortness of breath; Kidney lesion, native, right; Former smoker (Stopped ~5 years); Chronic respiratory failure with hypoxia and hypercapnia; Abnormal chest CT (Nodules, right hilar LN); and Stage IV (Very severe) Emphysema on their problem list., the following items were addressed: medical records; medications and any changes can be found within the plan section of the note.  A detailed listing of time spent for chronic care management is tracked within each outreach encounter.  Current medications include:  has a current medication list which includes the following prescription(s): accu-chek softclix lancets, albuterol, albuterol sulfate hfa, aspirin, azithromycin, clobetasol, freestyle zacarias 3 plus sensor, diltiazem cd, doxycycline, fluconazole, furosemide, accu-chek guide, novolog mix 70/30 flexpen, bd pen needle mansi 2nd gen, ipratropium-albuterol, losartan, nitroglycerin, nystatin, o2, potassium chloride, pravastatin, prednisone, prednisone, and symbicort. and the patient is reported to be patient is compliant with medication protocol,  Medications are reported to be non-effective in controlling symptoms and changes have been made to the medication protocol, blood sugars are  running higher following prednisone use.  Regarding these diagnoses, referrals were made to the following provider(s):  n/a.  All notes on chart for PCP to review.    The patient was monitored remotely for blood pressure; weight; blood glucose; medications; activity level.    The patient's physical needs include:  help taking medications as prescribed; physical healthcare; medication education.     The patient's mental support needs include:  needs met    The patient's cognitive support needs include:  needs met    The patient's psychosocial support needs include:  continued support    The patient's functional needs include: physical healthcare, call to [facility] regarding refill of sensors    The patient's environmental needs include:  not applicable    Care Plan overall comments:  reviewed    Refer to previous outreach notes for more information on the areas listed above.    Monthly Billing Diagnoses  (E11.65) Type 2 diabetes mellitus with hyperglycemia, without long-term current use of insulin    (I10) Primary hypertension    (J44.9) End stage COPD    Medications   Medications have been reconciled    Care Plan progress this month:      Recently Modified Care Plans Updates made since 1/27/2025 12:00 AM      No recently modified care plans.            Instructions   Patient was provided an electronic copy of care plan  CCM services were explained and offered and patient has accepted these services.  Patient has given their written consent to receive CCM services and understands that this includes the authorization of electronic communication of medical information with the other treating providers.  Patient understands that they may stop CCM services at any time and these changes will be effective at the end of the calendar month and will effectively revocate the agreement of CCM services.  Patient understands that only one practitioner can furnish and be paid for CCM services during one calendar month.  Patient  also understands that there may be co-payment or deductible fees in association with CCM services.  Patient will continue with at least monthly follow-up calls with the Ambulatory .    Marta CHOI  Ambulatory Case Management    2/27/2025, 15:24 EST

## 2025-02-28 RX ORDER — AZITHROMYCIN 250 MG/1
TABLET, FILM COATED ORAL
Qty: 15 TABLET | Refills: 0 | Status: SHIPPED | OUTPATIENT
Start: 2025-02-28

## 2025-02-28 NOTE — TELEPHONE ENCOUNTER
Rx Refill Note    Requested Prescriptions     Pending Prescriptions Disp Refills    azithromycin (ZITHROMAX) 250 MG tablet [Pharmacy Med Name: AZITHROMYCIN 250MG TABLETS] 15 tablet 0     Sig: TAKE 1 TABLET BY MOUTH EVERY OTHER DAY FOR COPD EXACERBATION OR SUPPRESSION        Last office visit with prescribing clinician: 12/18/2024      Next office visit with prescribing clinician: 3/21/2025   Last labs:   Last refill: n/a   Pharmacy (be sure to add in Epic). correct

## 2025-03-18 ENCOUNTER — PATIENT OUTREACH (OUTPATIENT)
Dept: CASE MANAGEMENT | Facility: CLINIC | Age: 70
End: 2025-03-18
Payer: MEDICARE

## 2025-03-18 ENCOUNTER — TELEPHONE (OUTPATIENT)
Dept: CASE MANAGEMENT | Facility: CLINIC | Age: 70
End: 2025-03-18
Payer: MEDICARE

## 2025-03-18 DIAGNOSIS — J44.9 END STAGE COPD: Primary | ICD-10-CM

## 2025-03-18 DIAGNOSIS — E11.65 TYPE 2 DIABETES MELLITUS WITH HYPERGLYCEMIA, WITHOUT LONG-TERM CURRENT USE OF INSULIN: ICD-10-CM

## 2025-03-18 NOTE — OUTREACH NOTE
AMBULATORY CASE MANAGEMENT NOTE    Names and Relationships of Patient/Support Persons: Contact: Sunil, Iker Ponce; Relationship: Self -     [facility] Interim Update    Spoke with patient and wife at this time for ACM scheduled outreach, identified self and role.  This AC covering for [facility] Dona GonzalezHector.  Patient states he doesn't feel well, has been experiencing increasing weakness.  He denies any symptoms of UTI, explained that sometimes weakness can be a sign of a UTI, even if he has no urinary symptoms, or other issues like his COPD/DM.  He is scheduled to see PCP in 3 days, states that if his weakness worsens he will go to the hospital.  Educated him and wife on S&S that they should seek immediate attention for, both verbalized understanding.    Patient states his breathing and edema are fine, that his diuretic seems to be working as it should and his pulse ox is staying WNL on 5L NC.  He also verified that his zacarias sensors were delivered.  He denies any issues with any medications or any other needs.  He states that he and his wife will continue to monitor his weakness and go to the ER if things worsen.        Education Documentation  Unresolved/Worsening Symptoms, taught by Debra Jernigan, KASEY at 3/18/2025  3:56 PM.  Learner: Significant Other, Patient  Readiness: Acceptance  Method: Explanation  Response: Verbalizes Understanding    Weight Monitoring, taught by Debra Jernigan, KASEY at 3/18/2025  3:56 PM.  Learner: Significant Other, Patient  Readiness: Acceptance  Method: Explanation  Response: Verbalizes Understanding    Provider Follow-Up, taught by Debra Jernigan RN at 3/18/2025  3:56 PM.  Learner: Significant Other, Patient  Readiness: Acceptance  Method: Explanation  Response: Verbalizes Understanding    Medication Management, taught by Debra Jernigan, KASEY at 3/18/2025  3:56 PM.  Learner: Significant Other, Patient  Readiness: Acceptance  Method: Explanation  Response: Verbalizes  Understanding    Unresolved/Worsening Symptoms, taught by Debra Jernigan, RN at 3/18/2025  3:56 PM.  Learner: Significant Other, Patient  Readiness: Acceptance  Method: Explanation  Response: Verbalizes Understanding    Unresolved/Worsening Symptoms, taught by Debra Jernigan, RN at 3/18/2025  3:56 PM.  Learner: Significant Other, Patient  Readiness: Acceptance  Method: Explanation  Response: Verbalizes Understanding    Provider Follow-Up, taught by Debra Jernigan RN at 3/18/2025  3:56 PM.  Learner: Significant Other, Patient  Readiness: Acceptance  Method: Explanation  Response: Verbalizes Understanding          Debra BROWN  Ambulatory Case Management    3/18/2025, 16:07 EDT

## 2025-03-20 ENCOUNTER — HOSPITAL ENCOUNTER (OUTPATIENT)
Facility: HOSPITAL | Age: 70
Discharge: HOME | End: 2025-03-20
Admitting: INTERNAL MEDICINE
Payer: MEDICARE

## 2025-03-20 DIAGNOSIS — Z87.891 FORMER SMOKER: ICD-10-CM

## 2025-03-20 DIAGNOSIS — R93.89 ABNORMAL CHEST CT: ICD-10-CM

## 2025-03-20 PROCEDURE — 71250 CT THORAX DX C-: CPT

## 2025-03-20 NOTE — ASSESSMENT & PLAN NOTE
Patient has been seen pulmonology.  Has a small spiculated lesion in his lung field.  Follow-up pulmonology.   no fever/no hematuria/no diarrhea/no dysuria/no abdominal distension/no blood in stool/no burning urination

## 2025-03-20 NOTE — PROGRESS NOTES
Patient Name: Iker Barber  : 1955   MRN: [id number]     Chief Complaint:    Chief Complaint   Patient presents with    Primary Care Follow-Up    Very dry skin       History of Present Illness: Iker Barber is a 70 y.o. male who is here today for follow up on blood glucose, heart disease, emphysema, and lung nodules  HPI        Review of Systems:   Review of Systems   Constitutional: Negative.    HENT: Negative.     Eyes: Negative.    Respiratory: Negative.     Cardiovascular: Negative.    Gastrointestinal: Negative.    Neurological: Negative.         Past Medical History:   Past Medical History:   Diagnosis Date    Anxiety     Arthritis     Asthma     Diabetes     Elevated blood pressure reading     Emphysema, unspecified     Esophageal reflux     Hearing problem     Heart disease     Hypercholesterolemia     Irritable bowel syndrome (IBS)     Peripheral artery disease     Skin disorder     Stroke (cerebrum)        Past Surgical History: No past surgical history on file.    Family History:   Family History   Problem Relation Age of Onset    Allergies Mother     Alzheimer's disease Mother     Diabetes Mother     Diabetes Father     Diabetes Brother        Social History:   Social History     Socioeconomic History    Marital status:    Tobacco Use    Smoking status: Former     Current packs/day: 0.00     Average packs/day: 1 pack/day for 40.0 years (40.0 ttl pk-yrs)     Types: Cigarettes     Start date:      Quit date:      Years since quittin.2     Passive exposure: Past    Smokeless tobacco: Never   Vaping Use    Vaping status: Never Used   Substance and Sexual Activity    Alcohol use: Not Currently    Drug use: Never    Sexual activity: Not Currently     Partners: Female       Medications:     Current Outpatient Medications:     Accu-Chek Softclix Lancets lancets, by Other route. Use as instructed, Disp: , Rfl:     albuterol (PROVENTIL) (2.5 MG/3ML) 0.083%  nebulizer solution, Take 2.5 mg by nebulization Every 4 (Four) Hours As Needed for Shortness of Air., Disp: 540 each, Rfl: 1    albuterol sulfate  (90 Base) MCG/ACT inhaler, Inhale 2 puffs Every 4 (Four) Hours As Needed for Wheezing., Disp: 8 g, Rfl: 10    aspirin 81 MG EC tablet, Take 1 tablet by mouth Daily., Disp: 90 tablet, Rfl: 3    azithromycin (ZITHROMAX) 250 MG tablet, TAKE 1 TABLET BY MOUTH EVERY OTHER DAY FOR COPD EXACERBATION OR SUPPRESSION, Disp: 15 tablet, Rfl: 0    clobetasol (TEMOVATE) 0.05 % external solution, Apply  topically to the appropriate area as directed 2 (Two) Times a Day., Disp: 50 mL, Rfl: 3    Continuous Glucose Sensor (FreeStyle Bela 3 Plus Sensor) misc, Use 1 each Every 14 (Fourteen) Days., Disp: 2 each, Rfl: 0    dilTIAZem CD (Cardizem CD) 300 MG 24 hr capsule, Take 1 capsule by mouth Daily., Disp: 90 capsule, Rfl: 2    furosemide (LASIX) 40 MG tablet, Take 1 tablet by mouth 2 (Two) Times a Day., Disp: 180 tablet, Rfl: 1    glucose blood (Accu-Chek Guide) test strip, 1 each by Other route Daily. Use as instructed, Disp: 100 each, Rfl: 2    insulin aspart prot & aspart (NovoLOG Mix 70/30 FlexPen) (70-30) 100 UNIT/ML suspension pen-injector injection, Inject 20 units with breakfast and 8 units with supper, titrate for max daily dose of 50 units, Disp: 15 mL, Rfl: 3    Insulin Pen Needle (BD Pen Needle Sumi 2nd Gen) 32G X 4 MM misc, Use twice daily as directed, Disp: 200 each, Rfl: 3    ipratropium-albuterol (DUO-NEB) 0.5-2.5 mg/3 ml nebulizer, Take 3 mL by nebulization 4 (Four) Times a Day., Disp: 360 mL, Rfl: 3    losartan (COZAAR) 100 MG tablet, Take 1 tablet by mouth Daily., Disp: 90 tablet, Rfl: 1    nitroglycerin (NITROSTAT) 0.4 MG SL tablet, Place 1 tablet under the tongue Every 5 (Five) Minutes As Needed for Chest Pain., Disp: 30 tablet, Rfl: 5    nystatin (MYCOSTATIN) 100,000 unit/mL suspension, Swish and swallow 5 mL 4 (Four) Times a Day., Disp: 473 mL, Rfl: 5    O2  "(OXYGEN), Inhale 5 L/min Continuous., Disp: , Rfl:     potassium chloride (MICRO-K) 10 MEQ CR capsule, Take 1 capsule by mouth 2 (Two) Times a Day., Disp: 180 capsule, Rfl: 1    pravastatin (PRAVACHOL) 40 MG tablet, Take 1 tablet by mouth Daily., Disp: 90 tablet, Rfl: 1    predniSONE (DELTASONE) 10 MG tablet, Take 4 tabs daily x 3 days, then take 3 tabs daily x 3 days, then take 2 tabs daily x 3 days, then take 1 tab daily x 3 days, Disp: 30 tablet, Rfl: 0    Symbicort 160-4.5 MCG/ACT inhaler, Inhale 2 puffs 2 (Two) Times a Day., Disp: 3 each, Rfl: 5    fluconazole (DIFLUCAN) 150 MG tablet, Take 1 tablet by mouth Daily., Disp: 10 tablet, Rfl: 5    megestrol (MEGACE) 40 MG/ML suspension, Take 20 mL by mouth Daily., Disp: 600 mL, Rfl: 3    Allergies:   Allergies   Allergen Reactions    Methylprednisolone Confusion         Physical Exam:  Vital Signs:   Vitals:    03/21/25 1055   BP: 98/64   BP Location: Left arm   Patient Position: Sitting   Cuff Size: Adult   Pulse: 64   Resp: 16   SpO2: 95%   Weight: 63.5 kg (140 lb 1.6 oz)   Height: 177.8 cm (70\")   PainSc: 0-No pain     Body mass index is 20.1 kg/m².     Physical Exam  Vitals and nursing note reviewed.   Constitutional:       Appearance: Normal appearance. He is normal weight.   HENT:      Head: Normocephalic and atraumatic.      Right Ear: Tympanic membrane, ear canal and external ear normal.      Left Ear: Tympanic membrane, ear canal and external ear normal.      Nose: Nose normal.      Mouth/Throat:      Mouth: Mucous membranes are dry.      Pharynx: Oropharynx is clear.   Eyes:      Extraocular Movements: Extraocular movements intact.      Conjunctiva/sclera: Conjunctivae normal.      Pupils: Pupils are equal, round, and reactive to light.   Cardiovascular:      Rate and Rhythm: Normal rate and regular rhythm.      Pulses: Normal pulses.      Heart sounds: Normal heart sounds.   Pulmonary:      Effort: Pulmonary effort is normal.      Breath sounds: Normal " breath sounds.   Musculoskeletal:      Cervical back: Normal range of motion and neck supple.   Feet:      Comments:      Neurological:      Mental Status: He is alert.         Procedures      Assessment/Plan:   Diagnoses and all orders for this visit:    1. Primary hypertension (Primary)  Assessment & Plan:  Discussed with patient to monitor their blood pressure and if systolic blood pressure goes above 140 or diastolic is above 90 to return to clinic.  Take medicines as directed, call for any problems, patient not having or any worrisome symptoms.        Orders:  -     losartan (COZAAR) 100 MG tablet; Take 1 tablet by mouth Daily.  Dispense: 90 tablet; Refill: 1    2. Type 2 diabetes mellitus with hyperglycemia, with long-term current use of insulin  Assessment & Plan:  Patient sees endocrinology.    Orders:  -     Ambulatory Referral to Chronic Care Management Disease Education, Medication Adherence, Care Coordination, High Risk for ED/Readmission, Caregiving/Support, Preventative Care, Barriers to Care    3. Coronary artery disease of native artery of native heart with stable angina pectoris  Assessment & Plan:  Aggressive risk factor modification.  Patient has seen Dr. Myers    Orders:  -     Ambulatory Referral to Chronic Care Management Disease Education, Medication Adherence, Care Coordination, High Risk for ED/Readmission, Caregiving/Support, Preventative Care, Barriers to Care    4. Abnormal chest CT (Nodules, right hilar LN)  Assessment & Plan:  Patient has been seen pulmonology.  Has a small spiculated lesion in his lung field.  Follow-up pulmonology.    Orders:  -     Ambulatory Referral to Chronic Care Management Disease Education, Medication Adherence, Care Coordination, High Risk for ED/Readmission, Caregiving/Support, Preventative Care, Barriers to Care    5. Hyperlipidemia, mixed  Assessment & Plan:  Recheck in 3 months.      6. Chronic respiratory failure with hypoxia and hypercapnia  Assessment  & Plan:  Patient on chronic oxygen therapy.  Follows up with pulmonology.    Orders:  -     Ambulatory Referral to Chronic Care Management Disease Education, Medication Adherence, Care Coordination, High Risk for ED/Readmission, Caregiving/Support, Preventative Care, Barriers to Care    7. Loss of weight  Assessment & Plan:  Patient has continued to lose weight.  We had a long discussion about this being a bad sign.  I am going to restart Megace.  I told him I want him eating 2000 scott a day.  Recheck in 3 months.      Other orders  -     pravastatin (PRAVACHOL) 40 MG tablet; Take 1 tablet by mouth Daily.  Dispense: 90 tablet; Refill: 1  -     furosemide (LASIX) 40 MG tablet; Take 1 tablet by mouth 2 (Two) Times a Day.  Dispense: 180 tablet; Refill: 1  -     dilTIAZem CD (Cardizem CD) 300 MG 24 hr capsule; Take 1 capsule by mouth Daily.  Dispense: 90 capsule; Refill: 2  -     predniSONE (DELTASONE) 10 MG tablet; Take 4 tabs daily x 3 days, then take 3 tabs daily x 3 days, then take 2 tabs daily x 3 days, then take 1 tab daily x 3 days  Dispense: 30 tablet; Refill: 0  -     megestrol (MEGACE) 40 MG/ML suspension; Take 20 mL by mouth Daily.  Dispense: 600 mL; Refill: 3             Follow Up:   Return in about 3 months (around 6/21/2025) for Annual physical.      Betito Bowen MD  [facility]

## 2025-03-21 ENCOUNTER — OFFICE VISIT (OUTPATIENT)
Dept: FAMILY MEDICINE CLINIC | Facility: CLINIC | Age: 70
End: 2025-03-21
Payer: MEDICARE

## 2025-03-21 VITALS
HEIGHT: 70 IN | WEIGHT: 140.1 LBS | DIASTOLIC BLOOD PRESSURE: 64 MMHG | BODY MASS INDEX: 20.06 KG/M2 | OXYGEN SATURATION: 95 % | RESPIRATION RATE: 16 BRPM | HEART RATE: 64 BPM | SYSTOLIC BLOOD PRESSURE: 98 MMHG

## 2025-03-21 DIAGNOSIS — R93.89 ABNORMAL CHEST CT: ICD-10-CM

## 2025-03-21 DIAGNOSIS — I25.118 CORONARY ARTERY DISEASE OF NATIVE ARTERY OF NATIVE HEART WITH STABLE ANGINA PECTORIS: Chronic | ICD-10-CM

## 2025-03-21 DIAGNOSIS — J96.11 CHRONIC RESPIRATORY FAILURE WITH HYPOXIA AND HYPERCAPNIA: ICD-10-CM

## 2025-03-21 DIAGNOSIS — E78.2 HYPERLIPIDEMIA, MIXED: Chronic | ICD-10-CM

## 2025-03-21 DIAGNOSIS — Z79.4 TYPE 2 DIABETES MELLITUS WITH HYPERGLYCEMIA, WITH LONG-TERM CURRENT USE OF INSULIN: ICD-10-CM

## 2025-03-21 DIAGNOSIS — R30.0 DYSURIA: ICD-10-CM

## 2025-03-21 DIAGNOSIS — J96.12 CHRONIC RESPIRATORY FAILURE WITH HYPOXIA AND HYPERCAPNIA: ICD-10-CM

## 2025-03-21 DIAGNOSIS — I10 PRIMARY HYPERTENSION: Primary | Chronic | ICD-10-CM

## 2025-03-21 DIAGNOSIS — E11.65 TYPE 2 DIABETES MELLITUS WITH HYPERGLYCEMIA, WITH LONG-TERM CURRENT USE OF INSULIN: ICD-10-CM

## 2025-03-21 DIAGNOSIS — R63.4 LOSS OF WEIGHT: ICD-10-CM

## 2025-03-21 PROCEDURE — 1126F AMNT PAIN NOTED NONE PRSNT: CPT | Performed by: FAMILY MEDICINE

## 2025-03-21 PROCEDURE — 3078F DIAST BP <80 MM HG: CPT | Performed by: FAMILY MEDICINE

## 2025-03-21 PROCEDURE — 3074F SYST BP LT 130 MM HG: CPT | Performed by: FAMILY MEDICINE

## 2025-03-21 PROCEDURE — 99214 OFFICE O/P EST MOD 30 MIN: CPT | Performed by: FAMILY MEDICINE

## 2025-03-21 RX ORDER — PREDNISONE 10 MG/1
TABLET ORAL
Qty: 30 TABLET | Refills: 0 | Status: SHIPPED | OUTPATIENT
Start: 2025-03-21

## 2025-03-21 RX ORDER — FUROSEMIDE 40 MG/1
40 TABLET ORAL 2 TIMES DAILY
Qty: 180 TABLET | Refills: 1 | Status: SHIPPED | OUTPATIENT
Start: 2025-03-21

## 2025-03-21 RX ORDER — LOSARTAN POTASSIUM 100 MG/1
100 TABLET ORAL DAILY
Qty: 90 TABLET | Refills: 1 | Status: SHIPPED | OUTPATIENT
Start: 2025-03-21

## 2025-03-21 RX ORDER — DILTIAZEM HYDROCHLORIDE 300 MG/1
300 CAPSULE, COATED, EXTENDED RELEASE ORAL DAILY
Qty: 90 CAPSULE | Refills: 2 | Status: SHIPPED | OUTPATIENT
Start: 2025-03-21

## 2025-03-21 RX ORDER — PRAVASTATIN SODIUM 40 MG
40 TABLET ORAL DAILY
Qty: 90 TABLET | Refills: 1 | Status: SHIPPED | OUTPATIENT
Start: 2025-03-21

## 2025-03-21 RX ORDER — MEGESTROL ACETATE 40 MG/ML
800 SUSPENSION ORAL DAILY
Qty: 600 ML | Refills: 3 | Status: SHIPPED | OUTPATIENT
Start: 2025-03-21

## 2025-03-21 NOTE — ASSESSMENT & PLAN NOTE
Patient has continued to lose weight.  We had a long discussion about this being a bad sign.  I am going to restart Megace.  I told him I want him eating 2000 scott a day.  Recheck in 3 months.

## 2025-03-23 LAB
BACTERIA UR CULT: NO GROWTH
BACTERIA UR CULT: NORMAL

## 2025-03-24 ENCOUNTER — TELEPHONE (OUTPATIENT)
Dept: FAMILY MEDICINE CLINIC | Facility: CLINIC | Age: 70
End: 2025-03-24
Payer: MEDICARE

## 2025-03-24 ENCOUNTER — TELEPHONE (OUTPATIENT)
Dept: CASE MANAGEMENT | Facility: OTHER | Age: 70
End: 2025-03-24
Payer: MEDICARE

## 2025-03-24 NOTE — TELEPHONE ENCOUNTER
Caller: [id number] - [place], [place] - [address] AT [facility] [address] - [phone]  - [phone] FX    Relationship to patient: Pharmacy      Best call back number: [phone] REFERENCE NUMBER [id number]    Provider: DOCTOR ALBERT SCHILLING needed: MEGESTROL    Reason for call/Prior Auth: THE CALLER STATES THAT THEY SENT PRIOR AUTHORIZATION PAPERWORK TO THE OFFICE LAST WEEK SHE WOULD LIKE TO KNOW IF THAT WAS COMPLETED

## 2025-03-25 ENCOUNTER — TELEPHONE (OUTPATIENT)
Dept: CASE MANAGEMENT | Facility: OTHER | Age: 70
End: 2025-03-25
Payer: MEDICARE

## 2025-03-25 NOTE — TELEPHONE ENCOUNTER
I called and talked to patient about his Megace being denied.  I am to call him in some mirtazapine.  He will follow-up at next appointment.

## 2025-03-25 NOTE — TELEPHONE ENCOUNTER
PA for Megestrol Acetate was denied due to below:    This drug is used for weight loss or weight gain purposes, which is one of the classes not   covered under the Part D coverage.

## 2025-03-27 ENCOUNTER — TELEPHONE (OUTPATIENT)
Dept: FAMILY MEDICINE CLINIC | Facility: CLINIC | Age: 70
End: 2025-03-27
Payer: MEDICARE

## 2025-03-27 ENCOUNTER — PATIENT OUTREACH (OUTPATIENT)
Dept: CASE MANAGEMENT | Facility: OTHER | Age: 70
End: 2025-03-27
Payer: MEDICARE

## 2025-03-27 DIAGNOSIS — I10 PRIMARY HYPERTENSION: ICD-10-CM

## 2025-03-27 DIAGNOSIS — J44.9 END STAGE COPD: Primary | ICD-10-CM

## 2025-03-27 DIAGNOSIS — E11.65 TYPE 2 DIABETES MELLITUS WITH HYPERGLYCEMIA, WITHOUT LONG-TERM CURRENT USE OF INSULIN: ICD-10-CM

## 2025-03-27 NOTE — OUTREACH NOTE
[facility] End of Month Documentation    This Chronic Medical Management Care Plan for Iker Barber, 70 y.o. male, has been reviewed; monitored and managed and a new plan of care implemented for the month of March.  A cumulative time of 29  minutes was spent on this patient record this month, including chart review; phone call with patient.    Regarding the patient's problems: has Type 2 diabetes mellitus with hyperglycemia, with long-term current use of insulin; Primary hypertension; Medicare annual wellness visit, subsequent; Bilateral leg edema; Loss of weight; Hyperlipidemia, mixed; Dysuria; Candida infection, oral; Coronary artery disease of native artery with stable angina pectoris; Shortness of breath; Kidney lesion, native, right; Former smoker (Stopped ~5 years); Chronic respiratory failure with hypoxia and hypercapnia; Abnormal chest CT (Nodules, right hilar LN); and Stage IV (Very severe) Emphysema on their problem list., the following items were addressed: medical records; medications and any changes can be found within the plan section of the note.  A detailed listing of time spent for chronic care management is tracked within each outreach encounter.  Current medications include:  has a current medication list which includes the following prescription(s): accu-chek softclix lancets, albuterol, albuterol sulfate hfa, aspirin, azithromycin, clobetasol, freestyle zacarias 3 plus sensor, diltiazem cd, fluconazole, furosemide, accu-chek guide, novolog mix 70/30 flexpen, bd pen needle mansi 2nd gen, ipratropium-albuterol, losartan, megestrol, nitroglycerin, nystatin, o2, potassium chloride, pravastatin, prednisone, and symbicort. and the patient is reported to be patient is compliant with medication protocol,  Medications are reported to be non-effective in controlling symptoms and changes have been made to the medication protocol.  Regarding these diagnoses, referrals were made to the following provider(s):   n/a.  All notes on chart for PCP to review.    The patient was monitored remotely for blood pressure; weight; blood glucose; medications; activity level.    The patient's physical needs include:  help taking medications as prescribed; physical healthcare; medication education.     The patient's mental support needs include:  needs met    The patient's cognitive support needs include:  needs met    The patient's psychosocial support needs include:  continued support    The patient's functional needs include: physical healthcare    The patient's environmental needs include:  not applicable    Care Plan overall comments:  reviewed    Refer to previous outreach notes for more information on the areas listed above.    Monthly Billing Diagnoses  (J44.9) End stage COPD    (E11.65) Type 2 diabetes mellitus with hyperglycemia, without long-term current use of insulin    (I10) Primary hypertension    Medications   Medications have been reconciled    Care Plan progress this month:      Recently Modified Care Plans Updates made since 2/24/2025 12:00 AM      No recently modified care plans.            Instructions   Patient was provided an electronic copy of care plan  CCM services were explained and offered and patient has accepted these services.  Patient has given their written consent to receive CCM services and understands that this includes the authorization of electronic communication of medical information with the other treating providers.  Patient understands that they may stop CCM services at any time and these changes will be effective at the end of the calendar month and will effectively revocate the agreement of CCM services.  Patient understands that only one practitioner can furnish and be paid for CCM services during one calendar month.  Patient also understands that there may be co-payment or deductible fees in association with CCM services.  Patient will continue with at least monthly follow-up calls with the  Ambulatory .    Marta CHOI  Ambulatory Case Management    3/27/2025, 13:24 EDT

## 2025-03-27 NOTE — TELEPHONE ENCOUNTER
Caller: DHIRAJ WITH  [id number] - [place], [place] - [address] AT [facility] [address] - [phone]  - [phone] FX    Relationship to patient: Pharmacy      Best call back number: [phone] RX # [phone]     Provider: DR PRICE    Medication PA needed:     megestrol (MEGACE) 40 MG/ML suspension       Reason for call/Prior Auth:  PLEASE SEND REQUEST TO COVER MY MEDS, PH: [phone], TO GET FORMS OR JUST LOG IN COVER MY MEDS

## 2025-03-31 NOTE — TELEPHONE ENCOUNTER
DHIRAJ PARRISH'S CALLED TO ASK FOR AN UPDATE ON THIS PA REQUEST. PLEASE CALL HER BACK [phone] TO ADVISE.    THANK YOU

## 2025-04-01 RX ORDER — AZITHROMYCIN 250 MG/1
TABLET, FILM COATED ORAL
Qty: 15 TABLET | Refills: 0 | Status: SHIPPED | OUTPATIENT
Start: 2025-04-01

## 2025-04-08 ENCOUNTER — PATIENT OUTREACH (OUTPATIENT)
Dept: CASE MANAGEMENT | Facility: OTHER | Age: 70
End: 2025-04-08
Payer: MEDICARE

## 2025-04-08 DIAGNOSIS — Z79.4 TYPE 2 DIABETES MELLITUS WITH HYPERGLYCEMIA, WITH LONG-TERM CURRENT USE OF INSULIN: Primary | ICD-10-CM

## 2025-04-08 DIAGNOSIS — I10 PRIMARY HYPERTENSION: Chronic | ICD-10-CM

## 2025-04-08 DIAGNOSIS — J44.9 END STAGE COPD: ICD-10-CM

## 2025-04-08 DIAGNOSIS — E11.65 TYPE 2 DIABETES MELLITUS WITH HYPERGLYCEMIA, WITH LONG-TERM CURRENT USE OF INSULIN: Primary | ICD-10-CM

## 2025-04-08 NOTE — OUTREACH NOTE
AMBULATORY CASE MANAGEMENT NOTE    Names and Relationships of Patient/Support Persons: Contact: Sunil Iker Kris; Relationship: Self -     CCM Interim Update    Spoke with Mr. Barber for CCM update. Today is his last day of this round of prednisone. He sees pulmonary tomorrow. He is aware of the results of his last CT of lungs. He states he does not want to have surgery.  He states that his breathing is stable at times and bad at others. He continues to use his o2.     He has continued to lose weight. This morning his weight was 137. He was unable to get megace due to insurance. The plan was for mirtazapine to be sent in but it has not been received yet. Mr. Barber is doubtful that will help though. He is consuming 2000 scott diet and he continues to lose weight. He feels that the more he eats the more he loses. He does have an appointment with endo coming up in May.     He reports that he is not feeling as weak. He has been taking supplements, Vit D, and believes that his helping.    He reports his blood sugar has been ok. Lately due to prednisone his blood sugar has been around 300. He is taking his insulin. He currently takes 20 U in the morning and 10 in evening.       Education Documentation  No documentation found.        Marta CHOI  Ambulatory Case Management    4/8/2025, 11:46 EDT

## 2025-04-09 ENCOUNTER — OFFICE VISIT (OUTPATIENT)
Dept: PULMONOLOGY | Facility: CLINIC | Age: 70
End: 2025-04-09
Payer: MEDICARE

## 2025-04-09 VITALS
HEART RATE: 81 BPM | HEIGHT: 70 IN | WEIGHT: 140 LBS | DIASTOLIC BLOOD PRESSURE: 50 MMHG | BODY MASS INDEX: 20.04 KG/M2 | OXYGEN SATURATION: 99 % | SYSTOLIC BLOOD PRESSURE: 102 MMHG

## 2025-04-09 DIAGNOSIS — J44.9 COPD MIXED TYPE: ICD-10-CM

## 2025-04-09 DIAGNOSIS — Z87.891 FORMER SMOKER: ICD-10-CM

## 2025-04-09 DIAGNOSIS — R93.89 ABNORMAL CHEST CT: Primary | ICD-10-CM

## 2025-04-09 DIAGNOSIS — J96.11 CHRONIC RESPIRATORY FAILURE WITH HYPOXIA AND HYPERCAPNIA: ICD-10-CM

## 2025-04-09 DIAGNOSIS — J96.12 CHRONIC RESPIRATORY FAILURE WITH HYPOXIA AND HYPERCAPNIA: ICD-10-CM

## 2025-04-09 RX ORDER — PREDNISONE 10 MG/1
TABLET ORAL
Qty: 31 TABLET | Refills: 2 | Status: SHIPPED | OUTPATIENT
Start: 2025-04-09

## 2025-04-09 RX ORDER — AZITHROMYCIN 250 MG/1
250 TABLET, FILM COATED ORAL 3 TIMES WEEKLY
Qty: 36 TABLET | Refills: 3 | Status: SHIPPED | OUTPATIENT
Start: 2025-04-09

## 2025-04-09 NOTE — PROGRESS NOTES
PULMONARY  NOTE    Chief Complaint     Stage IV COPD, former smoker, coronary artery disease, abnormal CT scan of the chest, chronic mixed hypoxic and hypercarbic respiratory failure, frequent exacerbations of COPD    History of Present Illness     70-year-old male returns today for follow-up  I last saw him 12/19/2020 for    He has stage IV, very severe, chronic obstructive pulmonary disease  He has frequent exacerbations  He remains on the same bronchodilator regimen    He is on suppressive azithromycin on a Monday, Wednesday, and Friday schedule  He feels that this is helped reduce exacerbations    He has chronic mixed hypoxic and hypercarbic respiratory failure  He did not tolerate a trial of noninvasive positive pressure ventilation in the past  Uses supplemental oxygen 24 hours a day    We have discussed lung transplantation in the past  He has not been interested in pursuing that evaluation    CT scan of the chest is revealed a right upper lobe pulmonary nodule which we are following with serial chest imaging    Patient Active Problem List   Diagnosis    Type 2 diabetes mellitus with hyperglycemia, with long-term current use of insulin    Primary hypertension    Medicare annual wellness visit, subsequent    Bilateral leg edema    Loss of weight    Hyperlipidemia, mixed    Dysuria    Candida infection, oral    Coronary artery disease of native artery with stable angina pectoris    Shortness of breath    Kidney lesion, native, right    Former smoker (Stopped ~5 years)    Chronic respiratory failure with hypoxia and hypercapnia    Abnormal chest CT (Nodules, right hilar LN)    Stage IV (Very severe) Emphysema      Allergies   Allergen Reactions    Methylprednisolone Confusion       Current Outpatient Medications:     Accu-Chek Softclix Lancets lancets, by Other route. Use as instructed, Disp: , Rfl:     albuterol (PROVENTIL) (2.5 MG/3ML) 0.083% nebulizer solution, Take 2.5 mg by nebulization Every 4 (Four)  Hours As Needed for Shortness of Air., Disp: 540 each, Rfl: 1    albuterol sulfate  (90 Base) MCG/ACT inhaler, Inhale 2 puffs Every 4 (Four) Hours As Needed for Wheezing., Disp: 8 g, Rfl: 10    aspirin 81 MG EC tablet, Take 1 tablet by mouth Daily., Disp: 90 tablet, Rfl: 3    azithromycin (ZITHROMAX) 250 MG tablet, TAKE 1 TABLET BY MOUTH EVERY OTHER DAY FOR COPD EXACERBATION OR SUPPRESSION, Disp: 15 tablet, Rfl: 0    clobetasol (TEMOVATE) 0.05 % external solution, Apply  topically to the appropriate area as directed 2 (Two) Times a Day., Disp: 50 mL, Rfl: 3    Continuous Glucose Sensor (FreeStyle Bela 3 Plus Sensor) misc, Use 1 each Every 14 (Fourteen) Days., Disp: 2 each, Rfl: 0    dilTIAZem CD (Cardizem CD) 300 MG 24 hr capsule, Take 1 capsule by mouth Daily., Disp: 90 capsule, Rfl: 2    fluconazole (DIFLUCAN) 150 MG tablet, Take 1 tablet by mouth Daily., Disp: 10 tablet, Rfl: 5    furosemide (LASIX) 40 MG tablet, Take 1 tablet by mouth 2 (Two) Times a Day., Disp: 180 tablet, Rfl: 1    glucose blood (Accu-Chek Guide) test strip, 1 each by Other route Daily. Use as instructed, Disp: 100 each, Rfl: 2    insulin aspart prot & aspart (NovoLOG Mix 70/30 FlexPen) (70-30) 100 UNIT/ML suspension pen-injector injection, Inject 20 units with breakfast and 8 units with supper, titrate for max daily dose of 50 units, Disp: 15 mL, Rfl: 3    Insulin Pen Needle (BD Pen Needle Sumi 2nd Gen) 32G X 4 MM misc, Use twice daily as directed, Disp: 200 each, Rfl: 3    ipratropium-albuterol (DUO-NEB) 0.5-2.5 mg/3 ml nebulizer, Take 3 mL by nebulization 4 (Four) Times a Day., Disp: 360 mL, Rfl: 3    losartan (COZAAR) 100 MG tablet, Take 1 tablet by mouth Daily., Disp: 90 tablet, Rfl: 1    megestrol (MEGACE) 40 MG/ML suspension, Take 20 mL by mouth Daily., Disp: 600 mL, Rfl: 3    nitroglycerin (NITROSTAT) 0.4 MG SL tablet, Place 1 tablet under the tongue Every 5 (Five) Minutes As Needed for Chest Pain., Disp: 30 tablet, Rfl: 5     "nystatin (MYCOSTATIN) 100,000 unit/mL suspension, Swish and swallow 5 mL 4 (Four) Times a Day., Disp: 473 mL, Rfl: 5    O2 (OXYGEN), Inhale 5 L/min Continuous., Disp: , Rfl:     potassium chloride (MICRO-K) 10 MEQ CR capsule, Take 1 capsule by mouth 2 (Two) Times a Day., Disp: 180 capsule, Rfl: 1    pravastatin (PRAVACHOL) 40 MG tablet, Take 1 tablet by mouth Daily., Disp: 90 tablet, Rfl: 1    predniSONE (DELTASONE) 10 MG tablet, Take 4 tabs daily x 3 days, then take 3 tabs daily x 3 days, then take 2 tabs daily x 3 days, then take 1 tab daily x 3 days, Disp: 30 tablet, Rfl: 0    Symbicort 160-4.5 MCG/ACT inhaler, Inhale 2 puffs 2 (Two) Times a Day., Disp: 3 each, Rfl: 5  MEDICATION LIST AND ALLERGIES REVIEWED.    Family History   Problem Relation Age of Onset    Allergies Mother     Alzheimer's disease Mother     Diabetes Mother     Diabetes Father     Diabetes Brother      Social History     Tobacco Use    Smoking status: Former     Current packs/day: 0.00     Average packs/day: 1 pack/day for 40.0 years (40.0 ttl pk-yrs)     Types: Cigarettes     Start date:      Quit date:      Years since quittin.2     Passive exposure: Past    Smokeless tobacco: Never   Vaping Use    Vaping status: Never Used   Substance Use Topics    Alcohol use: Not Currently    Drug use: Never     Social History     Social History Narrative    Not on file     FAMILY AND SOCIAL HISTORY REVIEWED.    Review of Systems  IF PRESENT REFER TO SCANNED ROS SHEET FROM SAME DATE  OTHERWISE ROS OBTAINED AND NON-CONTRIBUTORY OVER HPI.    /50   Pulse 81   Ht 177.8 cm (70\")   Wt 63.5 kg (140 lb)   SpO2 99% Comment: 5L  BMI 20.09 kg/m²   Physical Exam  Vitals and nursing note reviewed.   Constitutional:       General: He is not in acute distress.     Appearance: He is well-developed. He is not diaphoretic.   HENT:      Head: Normocephalic and atraumatic.   Neck:      Thyroid: No thyromegaly.   Cardiovascular:      Rate and Rhythm: " Normal rate and regular rhythm.      Heart sounds: Normal heart sounds. No murmur heard.  Pulmonary:      Effort: Pulmonary effort is normal.      Breath sounds: No stridor.      Comments: Decreased breath sounds bilaterally with scattered expiratory wheezes  Lymphadenopathy:      Cervical: No cervical adenopathy.      Upper Body:      Right upper body: No supraclavicular or epitrochlear adenopathy.      Left upper body: No supraclavicular or epitrochlear adenopathy.   Skin:     General: Skin is warm and dry.   Neurological:      Mental Status: He is alert and oriented to person, place, and time.   Psychiatric:         Behavior: Behavior normal.         Results     CT scan of the chest reviewed on PACS  Some slight interval change in the right upper lobe parenchymal nodule    There is no immunization history on file for this patient.  Problem List       ICD-10-CM ICD-9-CM   1. Abnormal chest CT (Nodules, right hilar LN)  R93.89 793.2   2. Former smoker (Stopped ~5 years)  Z87.891 V15.82   3. Stage IV (Very severe) Emphysema  J44.9 496   4. Chronic respiratory failure with hypoxia and hypercapnia  J96.11 518.83    J96.12 799.02     786.09       Discussion     We reviewed his chest imaging together on PACS  The right upper lobe nodule may be a little bit larger  Still too small to consider a PET scan    He is not a biopsy candidate not a surgical candidate  If this lesion does continue to enlarge then I would recommend a PET scan  If this lesion exhibits abnormal hypermetabolic activity on a PET scan with no activity elsewhere then would consider empiric SBRT    I will renew his suppressive azithromycin    I will make a tapering course of steroids available to him for an exacerbation    I will plan to see him back in 6 months or earlier if there are any problems in the meantime    This visit represents an established relationship with whom the provider is providing ongoing longitudinal care related to serious and/or  complex conditions    Moderate level of Medical Decision Making complexity based on 2 or more chronic stable illnesses and an independent review of test results and/or prescription drug management.    Kee Nguyen MD  Note electronically signed    CC: Betito Bowen MD

## 2025-04-10 DIAGNOSIS — R93.89 ABNORMAL CHEST CT: Primary | ICD-10-CM

## 2025-04-29 ENCOUNTER — PATIENT OUTREACH (OUTPATIENT)
Dept: CASE MANAGEMENT | Facility: OTHER | Age: 70
End: 2025-04-29
Payer: MEDICARE

## 2025-04-29 DIAGNOSIS — Z79.4 TYPE 2 DIABETES MELLITUS WITH HYPERGLYCEMIA, WITH LONG-TERM CURRENT USE OF INSULIN: Primary | ICD-10-CM

## 2025-04-29 DIAGNOSIS — I10 PRIMARY HYPERTENSION: ICD-10-CM

## 2025-04-29 DIAGNOSIS — E11.65 TYPE 2 DIABETES MELLITUS WITH HYPERGLYCEMIA, WITH LONG-TERM CURRENT USE OF INSULIN: Primary | ICD-10-CM

## 2025-04-29 DIAGNOSIS — J44.9 END STAGE COPD: ICD-10-CM

## 2025-04-29 NOTE — OUTREACH NOTE
[facility] End of Month Documentation    This Chronic Medical Management Care Plan for Iker Barber, 70 y.o. male, has been reviewed; monitored and managed and a new plan of care implemented for the month of April.  A cumulative time of 22  minutes was spent on this patient record this month, including chart review; phone call with patient.    Regarding the patient's problems: has Type 2 diabetes mellitus with hyperglycemia, with long-term current use of insulin; Primary hypertension; Medicare annual wellness visit, subsequent; Bilateral leg edema; Loss of weight; Hyperlipidemia, mixed; Dysuria; Candida infection, oral; Coronary artery disease of native artery with stable angina pectoris; Shortness of breath; Kidney lesion, native, right; Former smoker (Stopped ~5 years); Chronic respiratory failure with hypoxia and hypercapnia; Abnormal chest CT (Nodules, right hilar LN); and Stage IV (Very severe) Emphysema on their problem list., the following items were addressed: medical records; medications and any changes can be found within the plan section of the note.  A detailed listing of time spent for chronic care management is tracked within each outreach encounter.  Current medications include:  has a current medication list which includes the following prescription(s): accu-chek softclix lancets, albuterol, albuterol sulfate hfa, aspirin, azithromycin, clobetasol, freestyle zacarias 3 plus sensor, diltiazem cd, fluconazole, furosemide, accu-chek guide, novolog mix 70/30 flexpen, bd pen needle mansi 2nd gen, ipratropium-albuterol, losartan, megestrol, nitroglycerin, nystatin, o2, potassium chloride, pravastatin, prednisone, prednisone, and symbicort. and the patient is reported to be patient is compliant with medication protocol,  Medications are reported to be non-effective in controlling symptoms and changes have been made to the medication protocol, blood sugars are running higher following prednisone use.   Regarding these diagnoses, referrals were made to the following provider(s):  n/a.  All notes on chart for PCP to review.    The patient was monitored remotely for blood pressure; weight; blood glucose; medications; activity level.    The patient's physical needs include:  help taking medications as prescribed; physical healthcare; medication education.     The patient's mental support needs include:  needs met    The patient's cognitive support needs include:  needs met    The patient's psychosocial support needs include:  continued support    The patient's functional needs include: physical healthcare    The patient's environmental needs include:  not applicable    Care Plan overall comments:  reviewed    Refer to previous outreach notes for more information on the areas listed above.    Monthly Billing Diagnoses  (E11.65,  Z79.4) Type 2 diabetes mellitus with hyperglycemia, with long-term current use of insulin    (I10) Primary hypertension    (J44.9) End stage COPD    Medications   Medications have been reconciled    Care Plan progress this month:      Recently Modified Care Plans Updates made since 3/29/2025 12:00 AM      No recently modified care plans.            Instructions   Patient was provided an electronic copy of care plan  CCM services were explained and offered and patient has accepted these services.  Patient has given their written consent to receive CCM services and understands that this includes the authorization of electronic communication of medical information with the other treating providers.  Patient understands that they may stop CCM services at any time and these changes will be effective at the end of the calendar month and will effectively revocate the agreement of CCM services.  Patient understands that only one practitioner can furnish and be paid for CCM services during one calendar month.  Patient also understands that there may be co-payment or deductible fees in association with  CCM services.  Patient will continue with at least monthly follow-up calls with the Ambulatory .    Marta CHOI  Ambulatory Case Management    4/29/2025, 10:30 EDT

## 2025-05-14 ENCOUNTER — PATIENT OUTREACH (OUTPATIENT)
Dept: CASE MANAGEMENT | Facility: OTHER | Age: 70
End: 2025-05-14
Payer: MEDICARE

## 2025-05-14 ENCOUNTER — TRANSITIONAL CARE MANAGEMENT TELEPHONE ENCOUNTER (OUTPATIENT)
Dept: CALL CENTER | Facility: HOSPITAL | Age: 70
End: 2025-05-14
Payer: MEDICARE

## 2025-05-14 ENCOUNTER — READMISSION MANAGEMENT (OUTPATIENT)
Dept: CALL CENTER | Facility: HOSPITAL | Age: 70
End: 2025-05-14
Payer: MEDICARE

## 2025-05-14 ENCOUNTER — TELEPHONE (OUTPATIENT)
Dept: FAMILY MEDICINE CLINIC | Facility: CLINIC | Age: 70
End: 2025-05-14
Payer: MEDICARE

## 2025-05-14 DIAGNOSIS — J44.9 END STAGE COPD: ICD-10-CM

## 2025-05-14 DIAGNOSIS — E11.65 TYPE 2 DIABETES MELLITUS WITH HYPERGLYCEMIA, WITH LONG-TERM CURRENT USE OF INSULIN: Primary | ICD-10-CM

## 2025-05-14 DIAGNOSIS — I10 PRIMARY HYPERTENSION: ICD-10-CM

## 2025-05-14 DIAGNOSIS — Z79.4 TYPE 2 DIABETES MELLITUS WITH HYPERGLYCEMIA, WITH LONG-TERM CURRENT USE OF INSULIN: Primary | ICD-10-CM

## 2025-05-14 NOTE — TELEPHONE ENCOUNTER
DISCHARGE [facility] 05/15/2025      [facility]        NURSE THERAPY WILL DOCTOR ALBERT FOLLOW    VERBAL ORDERS

## 2025-05-14 NOTE — OUTREACH NOTE
AMBULATORY CASE MANAGEMENT NOTE    Names and Relationships of Patient/Support Persons: Contact: Sunil, Iker Ponce; Relationship: Self -     [facility] Interim Update    Received call from Mr. Barber. He was discharged home with  for SN, PT. He was having trouble with co2 retention due to use of 6L of o2. He has been instructed to keep his o2@2l. This does ok for him when he is resting but with exertion o2 goes down into the 80's.     His wife has also been in the hospital. She broke her arm and that was fixed but then had to be revised. They then sent her to a different hospital for the revision and he had no idea where she was. The hospital is 81 miles away in [place]. He does know where she is  now but is eager to have her closer.     He has been having issue with his [employer] sensors reading his blood sugar. He tried to call Telma with [employer] but has not been able to get through. [facility] called [employer] [phone] and left a message for Telma at ext [phone] to return call. Let patient know we are awaiting call back. He is concerned that he will not have a sensor after the 2 that he is currently using. [facility] instructed patient to call Bel[employer] to request a replacement and to let them know the issue he is having with his bela.    He has upcoming follow up with rainer. He reports that his morning blood sugars are running low. He was trying to keep them between 80-90. [facility] told him that is too tight of a window and he said the hospital told him the same thing. He is  now happy if they are below 150 in the morning. He does think that he needs to cut back 2U on his morning insulin dose.     He was referred to hospice by the hospital but went home with home health instead.        Education Documentation  No documentation found.        Marta CHOI  Ambulatory Case Management    5/14/2025, 15:02 EDT

## 2025-05-14 NOTE — OUTREACH NOTE
Call Center TCM Note      Flowsheet Row Responses   [facility] facility patient discharged from? Non-  [[facility]   Does the patient have one of the following disease processes/diagnoses(primary or secondary)? Other   TCM attempt successful? No   Unsuccessful attempts Attempt 2            RORO Meyer Registered Nurse    5/14/2025, 15:58 EDT

## 2025-05-14 NOTE — OUTREACH NOTE
Call Center TCM Note      Flowsheet Row Responses   [facility] facility patient discharged from? Non-  [[facility]]   Does the patient have one of the following disease processes/diagnoses(primary or secondary)? Other   TCM attempt successful? No  [verbal release for [place], wife]   Unsuccessful attempts Attempt 1  [attempted pt and wife]            RORO Meyer Registered Nurse    5/14/2025, 15:28 EDT

## 2025-05-14 NOTE — OUTREACH NOTE
Prep Survey      Flowsheet Row Responses   [facility] facility patient discharged from? Non-BH   Is LACE score < 7 ? Non-BH Discharge   Eligibility [facility]   Date of Discharge 05/13/25   Discharge Disposition Home-Health Care Svc   Discharge diagnosis COPD   Does the patient have one of the following disease processes/diagnoses(primary or secondary)? COPD   Does the patient have Home health ordered? Yes   What is the Home health agency?  [facility]   Prep survey completed? Yes            Anali TREADWELL - Registered Nurse

## 2025-05-15 ENCOUNTER — TRANSITIONAL CARE MANAGEMENT TELEPHONE ENCOUNTER (OUTPATIENT)
Dept: CALL CENTER | Facility: HOSPITAL | Age: 70
End: 2025-05-15
Payer: MEDICARE

## 2025-05-15 ENCOUNTER — TELEPHONE (OUTPATIENT)
Dept: FAMILY MEDICINE CLINIC | Facility: CLINIC | Age: 70
End: 2025-05-15
Payer: MEDICARE

## 2025-05-15 ENCOUNTER — PATIENT OUTREACH (OUTPATIENT)
Dept: CASE MANAGEMENT | Facility: OTHER | Age: 70
End: 2025-05-15
Payer: MEDICARE

## 2025-05-15 DIAGNOSIS — E11.65 TYPE 2 DIABETES MELLITUS WITH HYPERGLYCEMIA, WITH LONG-TERM CURRENT USE OF INSULIN: Primary | ICD-10-CM

## 2025-05-15 DIAGNOSIS — Z79.4 TYPE 2 DIABETES MELLITUS WITH HYPERGLYCEMIA, WITH LONG-TERM CURRENT USE OF INSULIN: Primary | ICD-10-CM

## 2025-05-15 DIAGNOSIS — J44.9 END STAGE COPD: ICD-10-CM

## 2025-05-15 DIAGNOSIS — I10 PRIMARY HYPERTENSION: ICD-10-CM

## 2025-05-15 NOTE — OUTREACH NOTE
AMBULATORY CASE MANAGEMENT NOTE    Names and Relationships of Patient/Support Persons: Contact: Telma; Relationship: Other -     Care Coordination    Spoke with Telma at [facility] [phone] ext [phone]. Explained to her the situation that Mr. Barber is having with his zacarias sensors. CCM asked if they have had others report issue if have low bmi or with decreased oxygenation due to end stage copd. She stated they have not but that would be a question for the people at [facility]. She will call Mr. Barber and ensure that he has the best number to reach her and to give him the information to contact [facility] again.     Education Documentation  No documentation found.        Marta CHOI  Ambulatory Case Management    5/15/2025, 10:34 EDT

## 2025-05-15 NOTE — TELEPHONE ENCOUNTER
Caller: Iker Barber    Relationship: Self    Best call back number: [phone]     What is the best time to reach you: ANY    Who are you requesting to speak with (clinical staff, provider,  specific staff member): DR PRICE    Do you know the name of the person who called: PT    What was the call regarding: PT WANTED TO LET PCP KNOW HE IS GOING WITH THE HOME HEALTH CARE THE HOSPITAL REFERRED. PT STATED HE DOESN'T WANT TO GET OUT RIGHT NOW AND WILL DO A VIDEO VISIT IF PCP WANTS TO SEE HIM. OR JUST CALL IS THAT'S EASIER. O2 LEVEL IS A LEVEL 2 AT REST. PT HAS TO INCREASE IT WHEN MOVING AROUND.

## 2025-05-15 NOTE — OUTREACH NOTE
Call Center TCM Note      Flowsheet Row Responses   [facility] patient discharged from? Non-  [[facility]]   Does the patient have one of the following disease processes/diagnoses(primary or secondary)? Other   TCM attempt successful? Yes   Call start time 1529   Call end time 1538   Discharge diagnosis COPD   Meds reviewed with patient/caregiver? Yes   Does the patient have all medications ordered at discharge? N/A   Prescription comments no new meds added at discharge   Is the patient taking all medications as directed (includes completed medication regime)? Yes   Comments Pt will schedule at a later date   Does the patient have an appointment with their PCP within 7-14 days of discharge? No   Nursing Interventions Routed TCM call to PCP office   What is the Home health agency?  [facility] assisting with care   Has home health visited the patient within 72 hours of discharge? Yes   Psychosocial issues? No   Did the patient receive a copy of their discharge instructions? Yes   Nursing interventions Reviewed instructions with patient   What is the patient's perception of their health status since discharge? Same  [Pt is followed by ACM who has reached out to him already, reports HH is following to assist with caree. Reports monitoring  sats and BG.]   Is the patient/caregiver able to teach back signs and symptoms related to disease process for when to call PCP? Yes   TCM call completed? Yes   Call end time 1538            RORO MCINTYRE - Registered Nurse    5/15/2025, 15:40 EDT

## 2025-05-16 ENCOUNTER — TELEPHONE (OUTPATIENT)
Dept: FAMILY MEDICINE CLINIC | Facility: CLINIC | Age: 70
End: 2025-05-16
Payer: MEDICARE

## 2025-05-16 NOTE — TELEPHONE ENCOUNTER
I called and talked to patient.  He went into the hospital because he upped his own oxygen.  I again told him that this was dangerous to do.  He is managing his own care and he is not trained to do it.  I advised him to follow the pulmonologist advice and to follow-up with pulmonology.  I told him if he upped his own oxygen he could become hypercapnic and go into a coma.

## 2025-05-16 NOTE — TELEPHONE ENCOUNTER
Caller: SCAR Meyer [facility]    Relationship:     Best call back number: [phone]     What orders are you requesting (i.e. lab or imaging): VERBAL FOR OT    In what timeframe would the patient need to come in: ASAP    Where will you receive your lab/imaging services: HOME    Additional notes: SCAR NEEDS VERBAL ORDERS FOR PT FOR OCCUPATIONAL THERAPY FOR 1 TIME WEEKLY FOR 8 WEEKS. OKAY TO LEAVE SECURE .

## 2025-05-19 ENCOUNTER — TELEPHONE (OUTPATIENT)
Dept: PULMONOLOGY | Facility: CLINIC | Age: 70
End: 2025-05-19
Payer: MEDICARE

## 2025-05-19 NOTE — TELEPHONE ENCOUNTER
Pt called today requesting an apt to see Dr Nguyen due to a recent hospital visit at [facility] for SOB and O2 levels dropping on exertion. Pt has had a Chest CT Scan while at hospital. Will get discharge summary and pt is okay with scheduling an apt with APRN for now.

## 2025-05-19 NOTE — PROGRESS NOTES
Chief complaint/Reason for consult: T2DM     HPI: Mr. Barber is a 70-year-old man with T2DM, hypertension, hyperlipidemia and COPD on chronic supplemental oxygen who comes for follow-up of uncontrolled diabetes. He was last seen 10/16/2024 and reports since that time ongoing respiratory issues.  Intermittent steroids for breathing issues.      # [id number], controlled with complications  # CAD   # Diabetic polyneuropathy   # Steroid induced hyperglycemia   - Diagnosed: 2012 based on routine labs   - Previously did very well with just glipizide but a few years ago he had worsened hyperglycemia and hypoglycemia   - Current regimen includes: 70/30 6u with breakfast and 4u with supper; he is to increase dose to 20 units with breakfast and 8 units with supper when he takes prednisone   - Compliance with medications is fair   - HbA1c: 7.5% today, in office blood glucose 167 mg/dL  - Wearing CGM      CGM Download  -Dates reviewed: 5/8/2025-5/21/2025  -Data: 96% wear time, average glucose 191 mg/dL, GMI 7.9%, glucose variability 30.5%, 16% very high, 40% high, 44% time in range, 0% low and 0% very low  -Interpretation: Postprandial hyperglycemia with downtrending fasting glucose     - Takes prednisone occasionally for breathing issues (20mg a few times per month)   - Hypoglycemia occurs with fasting, he has a lot of hypoglycemia fear, prefers his BG to be in upper 100s   - Hyperglycemia occurs worse after meals    - Patient reports neuropathy   - Patient denies gastroparesis   - Patient reports rotating injection sites   - Patient with known ASCVD   - taking ACEi/ARB; blood pressure today 138/64     DM Health Maintenance:  Ophtho: due, has a history of lens replacement in 2017   Monofilament / Foot exam: done 10/16/2024    Lipids/Statin: taking a statin with last FLP showing LDL 69 done 9/10/2024  LILIAN: 20 done 8/19/2024  TSH: 1.830 done 9/10/2024  Aspirin: taking    Past medical history, past surgical history, family history  "and social history reviewed within this encounter.     Review of Systems   Constitutional:  Positive for activity change and unexpected weight change.   HENT:  Negative for trouble swallowing.    Eyes:  Negative for visual disturbance.   Respiratory:  Positive for shortness of breath.    Cardiovascular:  Negative for chest pain.   Gastrointestinal:  Negative for abdominal pain.   Endocrine: Negative for cold intolerance and heat intolerance.   Musculoskeletal:  Positive for arthralgias.   Skin:  Negative for rash.   Neurological:  Positive for numbness.   Psychiatric/Behavioral:  Negative for agitation.         /64 (BP Location: Right arm, Patient Position: Sitting, Cuff Size: Adult)   Pulse 64   Ht 177.8 cm (70\")   Wt 62.1 kg (137 lb)   SpO2 93%   BMI 19.66 kg/m²      Physical Exam  Vitals reviewed.   HENT:      Head: Normocephalic.      Nose: Nose normal.   Eyes:      Conjunctiva/sclera: Conjunctivae normal.   Cardiovascular:      Rate and Rhythm: Normal rate.   Pulmonary:      Comments: Increased respiratory effort with prolonged expiratory phase, using supplemental oxygen  Abdominal:      Tenderness: There is no guarding.   Skin:     General: Skin is warm and dry.   Neurological:      Mental Status: He is alert and oriented to person, place, and time.   Psychiatric:         Mood and Affect: Mood normal.          Labs and images reviewed as noted in the HPI    Assessment and plan:    Diagnoses and all orders for this visit:    1. Type 2 diabetes mellitus with hyperglycemia, with long-term current use of insulin (Primary)  Assessment & Plan:  -Patient with reasonable glycemic control given his age and comorbidities  -Diabetes is complicated by frequent prednisone use for emphysema  -Complications include CAD, diabetic polyneuropathy and steroid-induced hyperglycemia  -Discussed basal/bolus regimen versus premixed insulin and I think for ease and simplicity continue with premixed insulin is likely the " best option given how frequently he is on prednisone  -Reduce 70/30 to 6 units with breakfast and 3 units with supper when he does not take prednisone  -Increase 70/30 to 24 units with breakfast and 6 units with supper when he does take prednisone  -Continue CGM  -Goal glucose in the mid to upper 100s  -Continue losartan; blood pressure today 138/64     DM Health Maintenance:  Ophtho: due, has a history of lens replacement in 2017   Monofilament / Foot exam: done 10/16/2024    Lipids/Statin: taking a statin with last FLP showing LDL 69 done 9/10/2024  LILIAN: 20 done 8/19/2024  TSH: 1.830 done 9/10/2024  Aspirin: taking    Orders:  -     POC Glycosylated Hemoglobin (Hb A1C)  -     POC Glucose, Blood  -     insulin aspart prot & aspart (NovoLOG Mix 70/30 FlexPen) (70-30) 100 UNIT/ML suspension pen-injector injection; Inject 6u with breakfast and 3u with supper when not on prednisone and 24u with breakfast and 6u with supper when taking prednisone, titrate for max daily dose of 50 units  Dispense: 15 mL; Refill: 5  -     Insulin Pen Needle (BD Pen Needle Sumi 2nd Gen) 32G X 4 MM misc; Use twice daily as directed  Dispense: 200 each; Refill: 3    2. Steroid-induced hyperglycemia  Assessment & Plan:  -Frequently uses prednisone for breathing issues  -Recommend increasing 70/30 to 24 units with breakfast and 6 units with supper when he takes steroids           Return in about 4 months (around 9/21/2025) for T2DM.       Please note that portions of this note may have been completed with a voice recognition program. Efforts were made to edit the dictations, but occasionally words are mistranscribed.     Electronically signed by: Kyle S Rosenstein, MD

## 2025-05-20 ENCOUNTER — OFFICE VISIT (OUTPATIENT)
Dept: PULMONOLOGY | Facility: CLINIC | Age: 70
End: 2025-05-20
Payer: MEDICARE

## 2025-05-20 VITALS
OXYGEN SATURATION: 95 % | SYSTOLIC BLOOD PRESSURE: 136 MMHG | HEIGHT: 70 IN | BODY MASS INDEX: 19.61 KG/M2 | WEIGHT: 137 LBS | DIASTOLIC BLOOD PRESSURE: 62 MMHG | HEART RATE: 58 BPM

## 2025-05-20 DIAGNOSIS — R93.89 ABNORMAL CHEST CT: ICD-10-CM

## 2025-05-20 DIAGNOSIS — J96.12 CHRONIC RESPIRATORY FAILURE WITH HYPOXIA AND HYPERCAPNIA: ICD-10-CM

## 2025-05-20 DIAGNOSIS — J96.11 CHRONIC RESPIRATORY FAILURE WITH HYPOXIA AND HYPERCAPNIA: ICD-10-CM

## 2025-05-20 DIAGNOSIS — Z87.891 FORMER SMOKER: ICD-10-CM

## 2025-05-20 DIAGNOSIS — R63.4 LOSS OF WEIGHT: ICD-10-CM

## 2025-05-20 DIAGNOSIS — J44.9 COPD MIXED TYPE: Primary | ICD-10-CM

## 2025-05-20 RX ORDER — ARFORMOTEROL TARTRATE 15 UG/2ML
15 SOLUTION RESPIRATORY (INHALATION)
Qty: 120 ML | Refills: 6 | Status: SHIPPED | OUTPATIENT
Start: 2025-05-20

## 2025-05-20 RX ORDER — BUDESONIDE 0.5 MG/2ML
0.5 INHALANT ORAL
Qty: 120 ML | Refills: 6 | Status: SHIPPED | OUTPATIENT
Start: 2025-05-20

## 2025-05-20 RX ORDER — DOXYCYCLINE 100 MG/1
1 TABLET ORAL EVERY 12 HOURS SCHEDULED
COMMUNITY
Start: 2025-04-21

## 2025-05-20 NOTE — PROGRESS NOTES
[facility] Pulmonary Follow up    CHIEF COMPLAINT    Severe COPD, chronic hypoxic respiratory failure, and former tobacco use.    HISTORY OF PRESENT ILLNESS    HPI:   Iker Barber is a 70 y.o.male followed by pulmonary regarding his severe COPD, chronic hypoxic respiratory failure, and former tobacco use.     Patient establish care with our clinic in October 2024 evaluated by Dr. Nguyen.    Former smoker with a 40-pack-year history with very severe COPD and frequent exacerbations.  He is on suppressive azithromycin and albuterol/DuoNebs as needed.    Interval history:   Patient was last seen in the office by Dr. Nguyen in April 2025.  At that time CT imaging noted to have slight interval enlargement of the RUL pulmonary nodule still too small to consider PET imaging.  He is not a biopsy nor surgical candidate. He was also treated with a steroid taper for an acute exacerbation.    Contacted our office yesterday and requested to be seen as he was hospitalized at [facility] for evaluation of altered mental status noted to be hypercarbic with a PCO2 of greater than 100.  He was treated for an acute COPD exacerbation with antibiotics/steroids and refused BiPAP therapy.  He has a DNI/DNR.  Declined hospice during that admission.    Since his discharge he feels that his respiratory status has stabilized.  He is accompanied by his family member who feels that he has had worsening anxiety likely in the setting of his advanced lung disease and recent hospitalization.  He seldom leaves the home due to concern for being without his oxygen.    Currently it seems that he is on arformoterol and budesonide nebulizers as well as DuoNebs on an as-needed basis.  He also remains on his suppressive azithromycin.    Continues to have an ongoing weight loss.    Does have type 2 diabetes with worsening issues of hyperglycemia in the steroid of recent steroid use given his COPD exacerbation.    Denies fever, chills,  night sweats, or hemoptysis. No recent sick contacts. No chest pain or palpitations. Denies lower extremity swelling or calf tenderness.     Patient Active Problem List   Diagnosis    Type 2 diabetes mellitus with hyperglycemia, with long-term current use of insulin    Primary hypertension    Medicare annual wellness visit, subsequent    Bilateral leg edema    Loss of weight    Hyperlipidemia, mixed    Dysuria    Candida infection, oral    Coronary artery disease of native artery with stable angina pectoris    Shortness of breath    Kidney lesion, native, right    Former smoker (Stopped in 2013)    Chronic respiratory failure with hypoxia and hypercapnia    Abnormal chest CT (Nodules, right hilar LN)    Stage IV (Very severe) Emphysema       Allergies   Allergen Reactions    Methylprednisolone Confusion       Current Outpatient Medications:     Accu-Chek Softclix Lancets lancets, by Other route. Use as instructed, Disp: , Rfl:     albuterol (PROVENTIL) (2.5 MG/3ML) 0.083% nebulizer solution, Take 2.5 mg by nebulization Every 4 (Four) Hours As Needed for Shortness of Air., Disp: 540 each, Rfl: 1    albuterol sulfate  (90 Base) MCG/ACT inhaler, Inhale 2 puffs Every 4 (Four) Hours As Needed for Wheezing., Disp: 8 g, Rfl: 10    aspirin 81 MG EC tablet, Take 1 tablet by mouth Daily., Disp: 90 tablet, Rfl: 3    azithromycin (ZITHROMAX) 250 MG tablet, Take 1 tablet by mouth 3 (Three) Times a Week. Take 1 tablet by mouth every Monday, Wednesday, and Friday of each week, Disp: 36 tablet, Rfl: 3    clobetasol (TEMOVATE) 0.05 % external solution, Apply  topically to the appropriate area as directed 2 (Two) Times a Day., Disp: 50 mL, Rfl: 3    Continuous Glucose Sensor (FreeStyle Bela 3 Plus Sensor) misc, Use 1 each Every 14 (Fourteen) Days., Disp: 2 each, Rfl: 0    dilTIAZem CD (Cardizem CD) 300 MG 24 hr capsule, Take 1 capsule by mouth Daily., Disp: 90 capsule, Rfl: 2    fluconazole (DIFLUCAN) 150 MG tablet, Take 1  tablet by mouth Daily., Disp: 10 tablet, Rfl: 5    furosemide (LASIX) 40 MG tablet, Take 1 tablet by mouth 2 (Two) Times a Day., Disp: 180 tablet, Rfl: 1    glucose blood (Accu-Chek Guide) test strip, 1 each by Other route Daily. Use as instructed, Disp: 100 each, Rfl: 2    insulin aspart prot & aspart (NovoLOG Mix 70/30 FlexPen) (70-30) 100 UNIT/ML suspension pen-injector injection, Inject 20 units with breakfast and 8 units with supper, titrate for max daily dose of 50 units, Disp: 15 mL, Rfl: 3    Insulin Pen Needle (BD Pen Needle Sumi 2nd Gen) 32G X 4 MM misc, Use twice daily as directed, Disp: 200 each, Rfl: 3    ipratropium-albuterol (DUO-NEB) 0.5-2.5 mg/3 ml nebulizer, Take 3 mL by nebulization 4 (Four) Times a Day., Disp: 360 mL, Rfl: 3    losartan (COZAAR) 100 MG tablet, Take 1 tablet by mouth Daily., Disp: 90 tablet, Rfl: 1    megestrol (MEGACE) 40 MG/ML suspension, Take 20 mL by mouth Daily., Disp: 600 mL, Rfl: 3    nitroglycerin (NITROSTAT) 0.4 MG SL tablet, Place 1 tablet under the tongue Every 5 (Five) Minutes As Needed for Chest Pain., Disp: 30 tablet, Rfl: 5    nystatin (MYCOSTATIN) 100,000 unit/mL suspension, Swish and swallow 5 mL 4 (Four) Times a Day., Disp: 473 mL, Rfl: 5    O2 (OXYGEN), Inhale 5 L/min Continuous., Disp: , Rfl:     potassium chloride (MICRO-K) 10 MEQ CR capsule, Take 1 capsule by mouth 2 (Two) Times a Day., Disp: 180 capsule, Rfl: 1    pravastatin (PRAVACHOL) 40 MG tablet, Take 1 tablet by mouth Daily., Disp: 90 tablet, Rfl: 1    arformoterol (BROVANA) 15 MCG/2ML nebulizer solution, Take 2 mL by nebulization 2 (Two) Times a Day., Disp: 120 mL, Rfl: 6    budesonide (Pulmicort) 0.5 MG/2ML nebulizer solution, Take 2 mL by nebulization 2 (Two) Times a Day., Disp: 120 mL, Rfl: 6    doxycycline (ADOXA) 100 MG tablet, Take 1 tablet by mouth Every 12 (Twelve) Hours. (Patient not taking: Reported on 5/20/2025), Disp: , Rfl:     predniSONE (DELTASONE) 10 MG tablet, Take 4 tabs daily x 3  "days, then take 3 tabs daily x 3 days, then take 2 tabs daily x 3 days, then take 1 tab daily x 3 days (Patient not taking: Reported on 2025), Disp: 30 tablet, Rfl: 0    predniSONE (DELTASONE) 10 MG tablet, Take 4 tabs daily x 3 days, then take 3 tabs daily x 3 days, then take 2 tabs daily x 3 days, then take 1 tab daily x 3 days (Patient not taking: Reported on 2025), Disp: 31 tablet, Rfl: 2  MEDICATION LIST AND ALLERGIES REVIEWED.    Social History     Tobacco Use    Smoking status: Former     Current packs/day: 0.00     Average packs/day: 1 pack/day for 40.0 years (40.0 ttl pk-yrs)     Types: Cigarettes     Start date:      Quit date:      Years since quittin.3     Passive exposure: Past    Smokeless tobacco: Never   Vaping Use    Vaping status: Never Used   Substance Use Topics    Alcohol use: Not Currently    Drug use: Never       FAMILY AND SOCIAL HISTORY REVIEWED.    Review of Systems   Constitutional:  Positive for fatigue. Negative for chills and fever.   Respiratory:  Positive for shortness of breath. Negative for cough, chest tightness and wheezing.    Cardiovascular:  Negative for chest pain, palpitations and leg swelling.   Skin:  Negative for color change.   Psychiatric/Behavioral:  Negative for sleep disturbance.    All other systems reviewed and are negative.      /62   Pulse 58   Ht 177.8 cm (70\")   Wt 62.1 kg (137 lb)   SpO2 95%   BMI 19.66 kg/m²       There is no immunization history on file for this patient.    Physical Exam  Vitals reviewed.   Constitutional:       General: He is not in acute distress.     Appearance: He is ill-appearing.      Comments: Elderly gentleman on supplemental oxygen   Cardiovascular:      Rate and Rhythm: Normal rate and regular rhythm.      Pulses: Normal pulses.      Heart sounds: Normal heart sounds.   Pulmonary:      Effort: Pulmonary effort is normal. No respiratory distress.      Breath sounds: No wheezing, rhonchi or rales.     "  Comments: Decreased throughout  Skin:     General: Skin is warm and dry.   Neurological:      General: No focal deficit present.      Mental Status: He is alert and oriented to person, place, and time.         RESULTS    PFTs:  None today    Imaging:   CT Chest Without Contrast Diagnostic  Result Date: 3/25/2025  Impression: 1. Minimally increased prominence of patient's small spiculated posterior right apical pulmonary nodule, whether due to slight increase in size from 12/10/2024 exam, or whether due to different CT scan slice selection. Appearance remains concerning for a  very small primary pulmonary neoplasm. 2. Stable chronic appearing changes elsewhere. No other new or progressive chest pathology is seen. Electronically Signed: Andrea Etienne MD  3/25/2025 4:28 PM EDT  Workstation ID: [id number]    Cardiac:   Results for orders placed in visit on 04/03/24    Adult Transthoracic Echo Complete W/ Cont if Necessary Per Protocol    Interpretation Summary    Left ventricular systolic function is hyperdynamic (EF > 70%). Calculated left ventricular EF = 69% Left ventricular ejection fraction appears to be greater than 70%.    Left ventricular diastolic function is consistent with (grade I) impaired relaxation.    Aortic valve area is 2.8 cm2. grossly normal valve    Peak velocity of the flow distal to the aortic valve is 123.3 cm/s. Aortic valve maximum pressure gradient is 6 mmHg. Aortic valve mean pressure gradient is 3 mmHg.    The mitral valve peak gradient is 4 mmHg. The mitral valve mean gradient is 2 mmHg. The mitral valve area (PHT) is 3.8 cm2.Normal valves    Estimated right ventricular systolic pressure from tricuspid regurgitation is normal (<35 mmHg). Calculated right ventricular systolic pressure from tricuspid regurgitation is 14 mmHg.    The aortic root measures 2.6 cm.    Normal RV size and function    No pericardial effusion    Limited parasternal windows       PROBLEM LIST    Problem List Items  Addressed This Visit       Loss of weight    Former smoker (Stopped in 2013)    Chronic respiratory failure with hypoxia and hypercapnia    Abnormal chest CT (Nodules, right hilar LN)    Stage IV (Very severe) Emphysema - Primary    Relevant Medications    budesonide (Pulmicort) 0.5 MG/2ML nebulizer solution    arformoterol (BROVANA) 15 MCG/2ML nebulizer solution       DISCUSSION    Patient was last seen in the office by Dr. Nguyen in April 2025.  At that time CT imaging noted to have slight interval enlargement of the RUL pulmonary nodule still too small to consider PET imaging.  He is not a biopsy nor surgical candidate. He was also treated with a steroid taper for an acute exacerbation.    Contacted our office yesterday and requested to be seen as he was hospitalized at [facility] for evaluation of altered mental status noted to be hypercarbic with a PCO2 of greater than 100.  He was treated for an acute COPD exacerbation with antibiotics/steroids and refused BiPAP therapy.  He has a DNI/DNR.  Declined hospice during that admission.    Since his discharge he feels that his respiratory status has stabilized.  He is accompanied by his family member who feels that he has had worsening anxiety likely in the setting of his advanced lung disease and recent hospitalization.  He seldom leaves the home due to concern for being without his oxygen.    Currently it seems that he is on arformoterol and budesonide nebulizers as well as DuoNebs on an as-needed basis.  He also remains on his suppressive azithromycin.    Continues to have an ongoing weight loss.    Does have type 2 diabetes with worsening issues of hyperglycemia in the steroid of recent steroid use given his COPD exacerbation.    COPD with recent exacerbation  Chronic mixed respiratory failure  Pulmonary cachexia  Patient has very severe COPD based on her records.  I am unable to view any PFTs on file.  [facility]  Currently  seems to be doing okay from a pulmonary standpoint.  Recent exacerbation as documented above requiring hospitalization at [facility].  Will continue his Brovana and Pulmicort nebulizers.  Will add Yupelri to his regimen to use once daily (sample provided).  Continue DuoNebs on an as-needed basis.  Continue suppressive azithromycin  If he continues to have exacerbations despite this regimen, may consider adding Dupixent to his regimen.  During his recent exacerbation he was noted to be hypercarbic with a PCO2 greater than 100.  BiPAP therapy was recommended, however he declined.  He is a DNI/DNR.  Discussed consideration of adding BiPAP versus trilogy device nocturnally and he declines.  Evidently he was on this before and had a lot of difficulty with these devices.  Patient counseled on monitoring for COPD exacerbation and treatment plan  Educated on importance of remaining up to date on vaccinations   Continue supplemental oxygen titrate to maintain SPO2 88-92%.  Evidently the patient will titrate his oxygen up to 5-6 L based on symptoms and did not routinely check his SpO2.  If his SpO2 is sufficient, it is possible this also contributed to worsening hypercarbia and he was educated on this at today's visit.  Does seem to have ongoing issues with weight loss and current BMI is 19.66.  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  Discussed ways to optimize his nutritional status at today's visit.    RUL pulmonary nodule  Patient is a former smoker with a 40-pack-year history who quit in 2013.  CT imaging has demonstrated a spiculated RUL nodule with possible slight interval increase since prior imaging however this may be due to slice thickness variation.  Recently underwent CT imaging at [facility] and this was measured at 7 mm which is smaller than the March 2025 study.  He has a short interval CT scan in place in August for further evaluation.  Given the severity of his COPD, he is not a candidate for  biopsy nor surgical intervention.  Functional status will be too poor for any type of chemo/immunotherapy.  Discussed consideration of [employer] and given how difficult it is for him to go to routine doctor appointments, he is not sure he would even want to pursue this.    Return to clinic in August after his CT of the chest with Dr. Nguyen or may see me sooner if needed.    I personally spent a total of 43 minutes on patient visit today including chart review, face to face with the patient obtaining the history and physical exam, review of pertinent images and tests, counseling and discussion and/or coordination of care as described above, and documentation.  Total time excludes time spent on other separate services such as performing procedures or test interpretation, if applicable.    Electronically signed by BILLIE Denson, 05/20/25, 4:10 PM EDT.    Please note that portions of this note were completed with a voice recognition program.      CC: Betito Bowen MD

## 2025-05-21 ENCOUNTER — OFFICE VISIT (OUTPATIENT)
Dept: ENDOCRINOLOGY | Facility: CLINIC | Age: 70
End: 2025-05-21
Payer: MEDICARE

## 2025-05-21 VITALS
BODY MASS INDEX: 19.61 KG/M2 | DIASTOLIC BLOOD PRESSURE: 64 MMHG | SYSTOLIC BLOOD PRESSURE: 138 MMHG | HEIGHT: 70 IN | HEART RATE: 64 BPM | WEIGHT: 137 LBS | OXYGEN SATURATION: 93 %

## 2025-05-21 DIAGNOSIS — E11.65 TYPE 2 DIABETES MELLITUS WITH HYPERGLYCEMIA, WITH LONG-TERM CURRENT USE OF INSULIN: Primary | ICD-10-CM

## 2025-05-21 DIAGNOSIS — R73.9 STEROID-INDUCED HYPERGLYCEMIA: ICD-10-CM

## 2025-05-21 DIAGNOSIS — T38.0X5A STEROID-INDUCED HYPERGLYCEMIA: ICD-10-CM

## 2025-05-21 DIAGNOSIS — Z79.4 TYPE 2 DIABETES MELLITUS WITH HYPERGLYCEMIA, WITH LONG-TERM CURRENT USE OF INSULIN: Primary | ICD-10-CM

## 2025-05-21 LAB
EXPIRATION DATE: ABNORMAL
EXPIRATION DATE: ABNORMAL
GLUCOSE BLDC GLUCOMTR-MCNC: 167 MG/DL (ref 70–130)
HBA1C MFR BLD: 7.5 % (ref 4.5–5.7)
Lab: ABNORMAL
Lab: ABNORMAL

## 2025-05-21 RX ORDER — INSULIN ASPART 100 [IU]/ML
INJECTION, SUSPENSION SUBCUTANEOUS
Qty: 15 ML | Refills: 5 | Status: SHIPPED | OUTPATIENT
Start: 2025-05-21

## 2025-05-21 RX ORDER — PEN NEEDLE, DIABETIC 32GX 5/32"
NEEDLE, DISPOSABLE MISCELLANEOUS
Qty: 200 EACH | Refills: 3 | Status: SHIPPED | OUTPATIENT
Start: 2025-05-21

## 2025-05-21 NOTE — ASSESSMENT & PLAN NOTE
-Frequently uses prednisone for breathing issues  -Recommend increasing 70/30 to 24 units with breakfast and 6 units with supper when he takes steroids

## 2025-05-21 NOTE — ASSESSMENT & PLAN NOTE
-Patient with reasonable glycemic control given his age and comorbidities  -Diabetes is complicated by frequent prednisone use for emphysema  -Complications include CAD, diabetic polyneuropathy and steroid-induced hyperglycemia  -Discussed basal/bolus regimen versus premixed insulin and I think for ease and simplicity continue with premixed insulin is likely the best option given how frequently he is on prednisone  -Reduce 70/30 to 6 units with breakfast and 3 units with supper when he does not take prednisone  -Increase 70/30 to 24 units with breakfast and 6 units with supper when he does take prednisone  -Continue CGM  -Goal glucose in the mid to upper 100s  -Continue losartan; blood pressure today 138/64     DM Health Maintenance:  Ophtho: due, has a history of lens replacement in 2017   Monofilament / Foot exam: done 10/16/2024    Lipids/Statin: taking a statin with last FLP showing LDL 69 done 9/10/2024  LILIAN: 20 done 8/19/2024  TSH: 1.830 done 9/10/2024  Aspirin: taking

## 2025-05-27 ENCOUNTER — PATIENT OUTREACH (OUTPATIENT)
Dept: CASE MANAGEMENT | Facility: OTHER | Age: 70
End: 2025-05-27
Payer: MEDICARE

## 2025-05-27 DIAGNOSIS — J44.9 END STAGE COPD: ICD-10-CM

## 2025-05-27 DIAGNOSIS — Z79.4 TYPE 2 DIABETES MELLITUS WITH HYPERGLYCEMIA, WITH LONG-TERM CURRENT USE OF INSULIN: Primary | ICD-10-CM

## 2025-05-27 DIAGNOSIS — J44.9 COPD MIXED TYPE: Primary | ICD-10-CM

## 2025-05-27 DIAGNOSIS — I10 PRIMARY HYPERTENSION: ICD-10-CM

## 2025-05-27 DIAGNOSIS — E11.65 TYPE 2 DIABETES MELLITUS WITH HYPERGLYCEMIA, WITH LONG-TERM CURRENT USE OF INSULIN: Primary | ICD-10-CM

## 2025-05-27 RX ORDER — BUDESONIDE 0.5 MG/2ML
0.5 INHALANT ORAL 2 TIMES DAILY
Qty: 120 ML | Refills: 11
Start: 2025-05-27

## 2025-05-27 NOTE — OUTREACH NOTE
AMBULATORY CASE MANAGEMENT NOTE    Names and Relationships of Patient/Support Persons: Contact: Sunil, Iker Ponce; Relationship: Self -     CCM Interim Update    Spoke with Mr. Barber for CCM update. He states that he is doing well. He has had his follow up with endocrinology and with Pulmonary. Mrs. Barber is still in [place] with their other daughter. She isn't well enough yet to fly home.     At last visit with pulmonology he was started on Yupelri. If he continues to have exacerbations they will consider adding Dupixent. He again declined bipap and trilogy.     Dr. Rosenstein felt that his diabetes is reasonably controlled. His A1C was 7.5. Dr. Rosenstein reduced his 70/30 insulin to 6U at breakfast and 3U at supper when not on prednisone and 70/30 24U with breakfast and 6U with supper when on prednisone. He did  not call [employer] to get replacement sensors. Gave him the number to contact the [employer] company. He will run out of sensors prior to when his next autofill is ready.    He is currently getting PT, OT from home health. He does not see that he will need them much longer. He is waiting on a delivery of o2 bottles so he can leave the house.  Broached the topic of hospice with him. He is adamant that he does not want hospice. He states that he is not anywhere close to passing. He also has concerns that he would have a large copay if he went on hospice and then had to be taken off.            Education Documentation  No documentation found.        Marta CHOI  Ambulatory Case Management    5/27/2025, 12:06 EDT

## 2025-05-28 RX ORDER — DILTIAZEM HYDROCHLORIDE 300 MG/1
300 CAPSULE, EXTENDED RELEASE ORAL DAILY
Qty: 90 CAPSULE | OUTPATIENT
Start: 2025-05-28

## 2025-05-30 ENCOUNTER — PATIENT OUTREACH (OUTPATIENT)
Dept: CASE MANAGEMENT | Facility: OTHER | Age: 70
End: 2025-05-30
Payer: MEDICARE

## 2025-05-30 DIAGNOSIS — I10 PRIMARY HYPERTENSION: ICD-10-CM

## 2025-05-30 DIAGNOSIS — E11.65 TYPE 2 DIABETES MELLITUS WITH HYPERGLYCEMIA, WITH LONG-TERM CURRENT USE OF INSULIN: Primary | ICD-10-CM

## 2025-05-30 DIAGNOSIS — J44.9 END STAGE COPD: ICD-10-CM

## 2025-05-30 DIAGNOSIS — Z79.4 TYPE 2 DIABETES MELLITUS WITH HYPERGLYCEMIA, WITH LONG-TERM CURRENT USE OF INSULIN: Primary | ICD-10-CM

## 2025-05-30 NOTE — OUTREACH NOTE
[facility] End of Month Documentation    This Chronic Medical Management Care Plan for Iker Barber, 70 y.o. male, has been reviewed; monitored and managed and a new plan of care implemented for the month of May.  A cumulative time of 31  minutes was spent on this patient record this month, including chart review; phone call with patient; phone call with other providers, Sara.    Regarding the patient's problems: has Type 2 diabetes mellitus with hyperglycemia, with long-term current use of insulin; Primary hypertension; Medicare annual wellness visit, subsequent; Bilateral leg edema; Loss of weight; Hyperlipidemia, mixed; Dysuria; Candida infection, oral; Coronary artery disease of native artery with stable angina pectoris; Shortness of breath; Kidney lesion, native, right; Former smoker (Stopped in 2013); Chronic respiratory failure with hypoxia and hypercapnia; Abnormal chest CT (Nodules, right hilar LN); Stage IV (Very severe) Emphysema; and Steroid-induced hyperglycemia on their problem list., the following items were addressed: medical records; medications and any changes can be found within the plan section of the note.  A detailed listing of time spent for chronic care management is tracked within each outreach encounter.  Current medications include:  has a current medication list which includes the following prescription(s): accu-chek softclix lancets, albuterol, albuterol sulfate hfa, arformoterol, aspirin, azithromycin, budesonide, budesonide, clobetasol, freestyle zacarias 3 plus sensor, diltiazem cd, doxycycline, fluconazole, furosemide, accu-chek guide, novolog mix 70/30 flexpen, bd pen needle mansi 2nd gen, ipratropium-albuterol, losartan, megestrol, nitroglycerin, nystatin, o2, potassium chloride, pravastatin, prednisone, and prednisone. and the patient is reported to be patient is compliant with medication protocol,  Medications are reported to be non-effective in controlling symptoms and  changes have been made to the medication protocol, shortness of breath, recent [place] at [facility] [place]  Regarding these diagnoses, referrals were made to the following provider(s):  n/a.  All notes on chart for PCP to review.    The patient was monitored remotely for blood pressure; weight; blood glucose; medications; activity level.    The patient's physical needs include:  help taking medications as prescribed; physical healthcare; medication education.     The patient's mental support needs include:  needs met    The patient's cognitive support needs include:  needs met    The patient's psychosocial support needs include:  continued support    The patient's functional needs include: physical healthcare; DME, [facility] ref[facility] of sensors    The patient's environmental needs include:  not applicable    Care Plan overall comments:  reviewed    Refer to previous outreach notes for more information on the areas listed above.    Monthly Billing Diagnoses  (E11.65,  Z79.4) Type 2 diabetes mellitus with hyperglycemia, with long-term current use of insulin    (J44.9) End stage COPD    (I10) Primary hypertension    Medications   Medications have been reconciled    Care Plan progress this month:      Recently Modified Care Plans Updates made since 4/29/2025 12:00 AM      No recently modified care plans.            Instructions   Patient was provided an electronic copy of care plan  CCM services were explained and offered and patient has accepted these services.  Patient has given their written consent to receive CCM services and understands that this includes the authorization of electronic communication of medical information with the other treating providers.  Patient understands that they may stop CCM services at any time and these changes will be effective at the end of the calendar month and will effectively revocate the agreement of CCM services.  Patient understands that only one practitioner can  furnish and be paid for CCM services during one calendar month.  Patient also understands that there may be co-payment or deductible fees in association with CCM services.  Patient will continue with at least monthly follow-up calls with the Ambulatory .    Marta CHOI  Ambulatory Case Management    5/30/2025, 10:19 EDT

## 2025-06-03 ENCOUNTER — TELEPHONE (OUTPATIENT)
Dept: FAMILY MEDICINE CLINIC | Facility: CLINIC | Age: 70
End: 2025-06-03

## 2025-06-03 NOTE — TELEPHONE ENCOUNTER
Caller: SCAR    Relationship: Home Health    Best call back number: [phone]    What was the call regarding: SCAR CALLED IN AND SAID OT WISHES TO DISCHARGES ALL GOALS MEET.

## 2025-06-16 RX ORDER — FUROSEMIDE 40 MG/1
40 TABLET ORAL 2 TIMES DAILY
Qty: 180 TABLET | Refills: 1 | Status: SHIPPED | OUTPATIENT
Start: 2025-06-16

## 2025-06-23 RX ORDER — POTASSIUM CHLORIDE 750 MG/1
10 CAPSULE, EXTENDED RELEASE ORAL 2 TIMES DAILY
Qty: 180 CAPSULE | Refills: 1 | Status: SHIPPED | OUTPATIENT
Start: 2025-06-23

## 2025-06-25 NOTE — ASSESSMENT & PLAN NOTE
Patient has been seen pulmonology.  Has a small spiculated lesion in his lung field.  Follow-up pulmonology.  Has CT scheduled for next month

## 2025-06-25 NOTE — ASSESSMENT & PLAN NOTE
Patient on chronic oxygen therapy.  Follows up with pulmonology.  Patient has been following pulmonology guidelines and not increasing his oxygen on his own

## 2025-06-25 NOTE — PROGRESS NOTES
Patient Name: kIer Barber  : 1955   MRN: [id number]     Chief Complaint:    Chief Complaint   Patient presents with    Hypertension    Rash On Face       History of Present Illness: Iker Barber is a 70 y.o. male who is here today for follow up on weight, COPD, blood pressure, and blood sugar.  HPI        Review of Systems:   Review of Systems   Constitutional: Negative.    HENT: Negative.     Eyes: Negative.    Respiratory: Negative.     Cardiovascular: Negative.    Gastrointestinal: Negative.    Neurological: Negative.         Past Medical History:   Past Medical History:   Diagnosis Date    Anxiety     Arthritis     Asthma     Diabetes     Elevated blood pressure reading     Emphysema, unspecified     Esophageal reflux     Hearing problem     Heart disease     Hypercholesterolemia     Irritable bowel syndrome (IBS)     Peripheral artery disease     Skin disorder     Stroke (cerebrum)        Past Surgical History: No past surgical history on file.    Family History:   Family History   Problem Relation Age of Onset    Allergies Mother     Alzheimer's disease Mother     Diabetes Mother     Diabetes Father     Diabetes Brother        Social History:   Social History     Socioeconomic History    Marital status:    Tobacco Use    Smoking status: Former     Current packs/day: 0.00     Average packs/day: 1 pack/day for 40.0 years (40.0 ttl pk-yrs)     Types: Cigarettes     Start date:      Quit date:      Years since quittin.4     Passive exposure: Past    Smokeless tobacco: Never   Vaping Use    Vaping status: Never Used   Substance and Sexual Activity    Alcohol use: Not Currently    Drug use: Never    Sexual activity: Not Currently     Partners: Female       Medications:     Current Outpatient Medications:     Accu-Chek Softclix Lancets lancets, by Other route. Use as instructed, Disp: , Rfl:     albuterol (PROVENTIL) (2.5 MG/3ML) 0.083% nebulizer solution,  Take 2.5 mg by nebulization Every 4 (Four) Hours As Needed for Shortness of Air., Disp: 540 each, Rfl: 1    albuterol sulfate  (90 Base) MCG/ACT inhaler, Inhale 2 puffs Every 4 (Four) Hours As Needed for Wheezing., Disp: 8 g, Rfl: 10    arformoterol (BROVANA) 15 MCG/2ML nebulizer solution, Take 2 mL by nebulization 2 (Two) Times a Day., Disp: 120 mL, Rfl: 6    aspirin 81 MG EC tablet, Take 1 tablet by mouth Daily., Disp: 90 tablet, Rfl: 3    azithromycin (ZITHROMAX) 250 MG tablet, Take 1 tablet by mouth 3 (Three) Times a Week. Take 1 tablet by mouth every Monday, Wednesday, and Friday of each week, Disp: 36 tablet, Rfl: 3    budesonide (Pulmicort) 0.5 MG/2ML nebulizer solution, Take 2 mL by nebulization 2 (Two) Times a Day., Disp: 120 mL, Rfl: 6    budesonide (Pulmicort) 0.5 MG/2ML nebulizer solution, Take 2 mL by nebulization 2 (Two) Times a Day., Disp: 120 mL, Rfl: 11    clobetasol (TEMOVATE) 0.05 % external solution, Apply  topically to the appropriate area as directed 2 (Two) Times a Day., Disp: 50 mL, Rfl: 3    Continuous Glucose Sensor (FreeStyle Bela 3 Plus Sensor) misc, Use 1 each Every 14 (Fourteen) Days., Disp: 2 each, Rfl: 0    dilTIAZem CD (Cardizem CD) 300 MG 24 hr capsule, Take 1 capsule by mouth Daily., Disp: 90 capsule, Rfl: 2    doxycycline (ADOXA) 100 MG tablet, Take 1 tablet by mouth Every 12 (Twelve) Hours., Disp: , Rfl:     fluconazole (DIFLUCAN) 150 MG tablet, Take 1 tablet by mouth Daily., Disp: 10 tablet, Rfl: 5    furosemide (LASIX) 40 MG tablet, TAKE 1 TABLET BY MOUTH TWICE DAILY, Disp: 180 tablet, Rfl: 1    glucose blood (Accu-Chek Guide) test strip, 1 each by Other route Daily. Use as instructed, Disp: 100 each, Rfl: 2    insulin aspart prot & aspart (NovoLOG Mix 70/30 FlexPen) (70-30) 100 UNIT/ML suspension pen-injector injection, Inject 6u with breakfast and 3u with supper when not on prednisone and 24u with breakfast and 6u with supper when taking prednisone, titrate for max  "daily dose of 50 units, Disp: 15 mL, Rfl: 5    Insulin Pen Needle (BD Pen Needle Sumi 2nd Gen) 32G X 4 MM misc, Use twice daily as directed, Disp: 200 each, Rfl: 3    ipratropium-albuterol (DUO-NEB) 0.5-2.5 mg/3 ml nebulizer, Take 3 mL by nebulization 4 (Four) Times a Day., Disp: 360 mL, Rfl: 3    losartan (COZAAR) 100 MG tablet, Take 1 tablet by mouth Daily., Disp: 90 tablet, Rfl: 1    megestrol (MEGACE) 40 MG/ML suspension, Take 20 mL by mouth Daily., Disp: 600 mL, Rfl: 3    nitroglycerin (NITROSTAT) 0.4 MG SL tablet, Place 1 tablet under the tongue Every 5 (Five) Minutes As Needed for Chest Pain., Disp: 30 tablet, Rfl: 5    nystatin (MYCOSTATIN) 100,000 unit/mL suspension, Swish and swallow 5 mL 4 (Four) Times a Day., Disp: 473 mL, Rfl: 5    O2 (OXYGEN), Inhale 5 L/min Continuous., Disp: , Rfl:     potassium chloride (MICRO-K) 10 MEQ CR capsule, TAKE 1 CAPSULE BY MOUTH TWICE DAILY, Disp: 180 capsule, Rfl: 1    pravastatin (PRAVACHOL) 40 MG tablet, Take 1 tablet by mouth Daily., Disp: 90 tablet, Rfl: 1    predniSONE (DELTASONE) 10 MG tablet, Take 4 tabs daily x 3 days, then take 3 tabs daily x 3 days, then take 2 tabs daily x 3 days, then take 1 tab daily x 3 days, Disp: 30 tablet, Rfl: 0    predniSONE (DELTASONE) 10 MG tablet, Take 4 tabs daily x 3 days, then take 3 tabs daily x 3 days, then take 2 tabs daily x 3 days, then take 1 tab daily x 3 days, Disp: 31 tablet, Rfl: 2    Allergies:   Allergies   Allergen Reactions    Methylprednisolone Confusion         Physical Exam:  Vital Signs:   Vitals:    06/26/25 1128   BP: 128/82   BP Location: Left arm   Patient Position: Sitting   Cuff Size: Adult   Pulse: 74   Resp: 16   SpO2: 98%   Weight: 67.6 kg (149 lb 1.6 oz)   Height: 177.8 cm (70\")   PainSc: 0-No pain     Body mass index is 21.39 kg/m².     Physical Exam  Vitals and nursing note reviewed.   Constitutional:       Appearance: Normal appearance. He is normal weight.   HENT:      Head: Normocephalic and " atraumatic.      Right Ear: Tympanic membrane, ear canal and external ear normal.      Left Ear: Tympanic membrane, ear canal and external ear normal.      Nose: Nose normal.      Mouth/Throat:      Mouth: Mucous membranes are dry.      Pharynx: Oropharynx is clear.   Eyes:      Extraocular Movements: Extraocular movements intact.      Conjunctiva/sclera: Conjunctivae normal.      Pupils: Pupils are equal, round, and reactive to light.   Cardiovascular:      Rate and Rhythm: Normal rate and regular rhythm.      Pulses: Normal pulses.      Heart sounds: Normal heart sounds.   Pulmonary:      Effort: Pulmonary effort is normal.      Breath sounds: Normal breath sounds.   Musculoskeletal:      Cervical back: Normal range of motion and neck supple.   Feet:      Comments:      Neurological:      Mental Status: He is alert.         Procedures      Assessment/Plan:   Diagnoses and all orders for this visit:    1. Loss of weight (Primary)  Assessment & Plan:  Patient has gained 10 pounds since last visit.      2. Primary hypertension  Assessment & Plan:  Discussed with patient to monitor their blood pressure and if systolic blood pressure goes above 140 or diastolic is above 90 to return to clinic.  Take medicines as directed, call for any problems, patient not having or any worrisome symptoms.          3. Type 2 diabetes mellitus with hyperglycemia, with long-term current use of insulin  Assessment & Plan:  Patient follows with endocrinology.      4. Abnormal chest CT (Nodules, right hilar LN)  Assessment & Plan:  Patient has been seen pulmonology.  Has a small spiculated lesion in his lung field.  Follow-up pulmonology.  Has CT scheduled for next month      5. Chronic respiratory failure with hypoxia and hypercapnia  Assessment & Plan:  Patient on chronic oxygen therapy.  Follows up with pulmonology.  Patient has been following pulmonology guidelines and not increasing his oxygen on his own               Follow Up:   No  follow-ups on file.      Betito Bowen MD  [facility] [place] [facility]

## 2025-06-26 ENCOUNTER — OFFICE VISIT (OUTPATIENT)
Dept: FAMILY MEDICINE CLINIC | Facility: CLINIC | Age: 70
End: 2025-06-26
Payer: MEDICARE

## 2025-06-26 VITALS
SYSTOLIC BLOOD PRESSURE: 128 MMHG | HEIGHT: 70 IN | OXYGEN SATURATION: 98 % | BODY MASS INDEX: 21.35 KG/M2 | DIASTOLIC BLOOD PRESSURE: 82 MMHG | WEIGHT: 149.1 LBS | RESPIRATION RATE: 16 BRPM | HEART RATE: 74 BPM

## 2025-06-26 DIAGNOSIS — J96.11 CHRONIC RESPIRATORY FAILURE WITH HYPOXIA AND HYPERCAPNIA: ICD-10-CM

## 2025-06-26 DIAGNOSIS — R93.89 ABNORMAL CHEST CT: ICD-10-CM

## 2025-06-26 DIAGNOSIS — I10 PRIMARY HYPERTENSION: Chronic | ICD-10-CM

## 2025-06-26 DIAGNOSIS — Z79.4 TYPE 2 DIABETES MELLITUS WITH HYPERGLYCEMIA, WITH LONG-TERM CURRENT USE OF INSULIN: ICD-10-CM

## 2025-06-26 DIAGNOSIS — E11.65 TYPE 2 DIABETES MELLITUS WITH HYPERGLYCEMIA, WITH LONG-TERM CURRENT USE OF INSULIN: ICD-10-CM

## 2025-06-26 DIAGNOSIS — R63.4 LOSS OF WEIGHT: Primary | ICD-10-CM

## 2025-06-26 DIAGNOSIS — J96.12 CHRONIC RESPIRATORY FAILURE WITH HYPOXIA AND HYPERCAPNIA: ICD-10-CM

## 2025-07-02 ENCOUNTER — PATIENT OUTREACH (OUTPATIENT)
Dept: CASE MANAGEMENT | Facility: OTHER | Age: 70
End: 2025-07-02
Payer: MEDICARE

## 2025-07-02 DIAGNOSIS — E11.65 TYPE 2 DIABETES MELLITUS WITH HYPERGLYCEMIA, WITH LONG-TERM CURRENT USE OF INSULIN: Primary | ICD-10-CM

## 2025-07-02 DIAGNOSIS — Z79.4 TYPE 2 DIABETES MELLITUS WITH HYPERGLYCEMIA, WITH LONG-TERM CURRENT USE OF INSULIN: Primary | ICD-10-CM

## 2025-07-02 DIAGNOSIS — I10 PRIMARY HYPERTENSION: ICD-10-CM

## 2025-07-02 DIAGNOSIS — J44.9 END STAGE COPD: ICD-10-CM

## 2025-07-02 NOTE — OUTREACH NOTE
"AMBULATORY CASE MANAGEMENT NOTE    Names and Relationships of Patient/Support Persons: Contact: Sunil, Iker Ponce; Relationship: Self -     CCM Interim Update    Spoke with Mr. Barber for CCM update. His wife has made it back home and she is doing much better. He is glad she is home.    He states that he has been feeling better. He saw Dr. Bowen last week and Dr. Bowen was pleased with how he looks. He has gained 10 pounds since last visit. He feels that it is true weight gain and not fluid weight. He has been exercising some. He is also being more careful about what he eats ie foods such as avocados.    He did get his sensors from [facility]. He mentioned his last A1C of 7.5 and that he needs to work on that. Explained to Mr. Barber that we do not want him too low and that Dr. Bowen was pleased with his A1C. Unless endocrinology wants to see it lower he can stay where he is. He reports he is going up to 375-400 at night because he snacks before bed. He does this because he does not want to go low during the night. In the morning it has usually recovered to 125. The lowest BS he has had recently was 90.     He returns to see his lung doctor later this month and is supposed to have repeat CT scan of his lungs at that time. He reports that his breathing is a little \"tricky.\" He wakes during the night and feels like he is full of mucous. He takes his mucinex regularly. It startles him to wake like this. He usually quickly focuses and is able to do his breathing exercises.        Education Documentation  No documentation found.        Marta CHOI  Ambulatory Case Management    7/2/2025, 15:19 EDT  "

## 2025-07-11 ENCOUNTER — DOCUMENTATION (OUTPATIENT)
Dept: PULMONOLOGY | Facility: CLINIC | Age: 70
End: 2025-07-11
Payer: MEDICARE

## 2025-07-11 NOTE — PROGRESS NOTES
The patient underwent a CT scan of the chest which I reviewed and revealed stability in the previously noted pulmonary nodule.  Will discuss these results with the patient when he comes in for his follow-up appointment next week.  Will decide at that time on ongoing radiographic follow-up to ensure stability.

## 2025-07-21 ENCOUNTER — OFFICE VISIT (OUTPATIENT)
Dept: PULMONOLOGY | Facility: CLINIC | Age: 70
End: 2025-07-21
Payer: MEDICARE

## 2025-07-21 VITALS
DIASTOLIC BLOOD PRESSURE: 70 MMHG | HEIGHT: 70 IN | WEIGHT: 155.06 LBS | BODY MASS INDEX: 22.2 KG/M2 | SYSTOLIC BLOOD PRESSURE: 116 MMHG | HEART RATE: 62 BPM | TEMPERATURE: 98.7 F | RESPIRATION RATE: 16 BRPM | OXYGEN SATURATION: 95 %

## 2025-07-21 DIAGNOSIS — J96.11 CHRONIC RESPIRATORY FAILURE WITH HYPOXIA AND HYPERCAPNIA: ICD-10-CM

## 2025-07-21 DIAGNOSIS — J96.12 CHRONIC RESPIRATORY FAILURE WITH HYPOXIA AND HYPERCAPNIA: ICD-10-CM

## 2025-07-21 DIAGNOSIS — J44.9 COPD MIXED TYPE: Primary | ICD-10-CM

## 2025-07-21 DIAGNOSIS — Z87.891 FORMER SMOKER: ICD-10-CM

## 2025-07-21 DIAGNOSIS — R93.89 ABNORMAL CHEST CT: ICD-10-CM

## 2025-07-21 PROCEDURE — 1159F MED LIST DOCD IN RCRD: CPT | Performed by: NURSE PRACTITIONER

## 2025-07-21 PROCEDURE — 99214 OFFICE O/P EST MOD 30 MIN: CPT | Performed by: NURSE PRACTITIONER

## 2025-07-21 PROCEDURE — 3078F DIAST BP <80 MM HG: CPT | Performed by: NURSE PRACTITIONER

## 2025-07-21 PROCEDURE — 3074F SYST BP LT 130 MM HG: CPT | Performed by: NURSE PRACTITIONER

## 2025-07-21 PROCEDURE — 1160F RVW MEDS BY RX/DR IN RCRD: CPT | Performed by: NURSE PRACTITIONER

## 2025-07-21 PROCEDURE — G2211 COMPLEX E/M VISIT ADD ON: HCPCS | Performed by: NURSE PRACTITIONER

## 2025-07-21 NOTE — PROGRESS NOTES
[facility] Pulmonary Follow up    CHIEF COMPLAINT    Severe COPD, chronic hypoxic respiratory failure, and former tobacco use.    HISTORY OF PRESENT ILLNESS    HPI:   Ikre Barber is a 70 y.o.male followed by pulmonary regarding his severe COPD, chronic hypoxic respiratory failure, and former tobacco use.     Patient establish care with our clinic in October 2024 evaluated by Dr. Nguyen.    Former smoker with a 40-pack-year history with very severe COPD and frequent exacerbations.  He is on suppressive azithromycin, aformoterol/budesonide nebulizers, and albuterol/DuoNebs as needed.    CT imaging from March 2025 did show a slight interval enlargement of the RUL pulmonary nodule still too small to consider PET imaging.  He is not a biopsy nor surgical candidate. He was also treated with a steroid taper for an acute exacerbation.    Interval history:   Patient was last seen in the office by me in May after hospitalization at [facility] for AMS secondary to hypercarbia with a PCO2 of greater than 100. He was treated for an acute COPD exacerbation with antibiotics/steroids and refused BiPAP therapy.  He has a DNI/DNR.  Declined hospice during that admission.    At the time of his appointment he felt his respiratory status had stabilized and was accompanied by a family member who felt that he has had worsening anxiety likely in the setting of his advanced lung disease and recent hospitalization.  He seldom leaves the home due to concern for being without his oxygen.    He did undergo serial CT of the chest which shows stability of the RUL pulmonary nodule.    Denies ED visits, exacerbations, or hospitalizations since his last appointment..    Denies fever, chills, night sweats, or hemoptysis. No recent sick contacts. No chest pain or palpitations. Denies lower extremity swelling or calf tenderness.     Patient Active Problem List   Diagnosis    Type 2 diabetes mellitus with hyperglycemia,  with long-term current use of insulin    Primary hypertension    Medicare annual wellness visit, subsequent    Bilateral leg edema    Loss of weight    Hyperlipidemia, mixed    Dysuria    Candida infection, oral    Coronary artery disease of native artery with stable angina pectoris    Shortness of breath    Kidney lesion, native, right    Former smoker (Stopped in 2013)    Chronic respiratory failure with hypoxia and hypercapnia    Abnormal chest CT (Nodules, right hilar LN)    Stage IV (Very severe) Emphysema    Steroid-induced hyperglycemia       Allergies   Allergen Reactions    Methylprednisolone Confusion       Current Outpatient Medications:     Accu-Chek Softclix Lancets lancets, by Other route. Use as instructed, Disp: , Rfl:     albuterol (PROVENTIL) (2.5 MG/3ML) 0.083% nebulizer solution, Take 2.5 mg by nebulization Every 4 (Four) Hours As Needed for Shortness of Air., Disp: 540 each, Rfl: 1    albuterol sulfate  (90 Base) MCG/ACT inhaler, Inhale 2 puffs Every 4 (Four) Hours As Needed for Wheezing., Disp: 8 g, Rfl: 10    arformoterol (BROVANA) 15 MCG/2ML nebulizer solution, Take 2 mL by nebulization 2 (Two) Times a Day., Disp: 120 mL, Rfl: 6    aspirin 81 MG EC tablet, Take 1 tablet by mouth Daily., Disp: 90 tablet, Rfl: 3    azithromycin (ZITHROMAX) 250 MG tablet, Take 1 tablet by mouth 3 (Three) Times a Week. Take 1 tablet by mouth every Monday, Wednesday, and Friday of each week, Disp: 36 tablet, Rfl: 3    budesonide (Pulmicort) 0.5 MG/2ML nebulizer solution, Take 2 mL by nebulization 2 (Two) Times a Day., Disp: 120 mL, Rfl: 6    budesonide (Pulmicort) 0.5 MG/2ML nebulizer solution, Take 2 mL by nebulization 2 (Two) Times a Day., Disp: 120 mL, Rfl: 11    clobetasol (TEMOVATE) 0.05 % external solution, Apply  topically to the appropriate area as directed 2 (Two) Times a Day., Disp: 50 mL, Rfl: 3    Continuous Glucose Sensor (FreeStyle Bela 3 Plus Sensor) misc, Use 1 each Every 14 (Fourteen)  Days., Disp: 2 each, Rfl: 0    dilTIAZem CD (Cardizem CD) 300 MG 24 hr capsule, Take 1 capsule by mouth Daily., Disp: 90 capsule, Rfl: 2    fluconazole (DIFLUCAN) 150 MG tablet, Take 1 tablet by mouth Daily., Disp: 10 tablet, Rfl: 5    furosemide (LASIX) 40 MG tablet, TAKE 1 TABLET BY MOUTH TWICE DAILY, Disp: 180 tablet, Rfl: 1    glucose blood (Accu-Chek Guide) test strip, 1 each by Other route Daily. Use as instructed, Disp: 100 each, Rfl: 2    insulin aspart prot & aspart (NovoLOG Mix 70/30 FlexPen) (70-30) 100 UNIT/ML suspension pen-injector injection, Inject 6u with breakfast and 3u with supper when not on prednisone and 24u with breakfast and 6u with supper when taking prednisone, titrate for max daily dose of 50 units, Disp: 15 mL, Rfl: 5    Insulin Pen Needle (BD Pen Needle Sumi 2nd Gen) 32G X 4 MM misc, Use twice daily as directed, Disp: 200 each, Rfl: 3    ipratropium-albuterol (DUO-NEB) 0.5-2.5 mg/3 ml nebulizer, Take 3 mL by nebulization 4 (Four) Times a Day., Disp: 360 mL, Rfl: 3    losartan (COZAAR) 100 MG tablet, Take 1 tablet by mouth Daily., Disp: 90 tablet, Rfl: 1    megestrol (MEGACE) 40 MG/ML suspension, Take 20 mL by mouth Daily., Disp: 600 mL, Rfl: 3    nitroglycerin (NITROSTAT) 0.4 MG SL tablet, Place 1 tablet under the tongue Every 5 (Five) Minutes As Needed for Chest Pain., Disp: 30 tablet, Rfl: 5    nystatin (MYCOSTATIN) 100,000 unit/mL suspension, Swish and swallow 5 mL 4 (Four) Times a Day., Disp: 473 mL, Rfl: 5    O2 (OXYGEN), Inhale 5 L/min Continuous., Disp: , Rfl:     potassium chloride (MICRO-K) 10 MEQ CR capsule, TAKE 1 CAPSULE BY MOUTH TWICE DAILY, Disp: 180 capsule, Rfl: 1    pravastatin (PRAVACHOL) 40 MG tablet, Take 1 tablet by mouth Daily., Disp: 90 tablet, Rfl: 1    doxycycline (ADOXA) 100 MG tablet, Take 1 tablet by mouth Every 12 (Twelve) Hours., Disp: , Rfl:     predniSONE (DELTASONE) 10 MG tablet, Take 4 tabs daily x 3 days, then take 3 tabs daily x 3 days, then take 2  "tabs daily x 3 days, then take 1 tab daily x 3 days, Disp: 30 tablet, Rfl: 0    predniSONE (DELTASONE) 10 MG tablet, Take 4 tabs daily x 3 days, then take 3 tabs daily x 3 days, then take 2 tabs daily x 3 days, then take 1 tab daily x 3 days (Patient not taking: Reported on 2025), Disp: 31 tablet, Rfl: 2    revefenacin (YUPELRI) 175 MCG/3ML nebulizer solution, Take 3 mL by nebulization Daily., Disp: 120 mL, Rfl: 11  MEDICATION LIST AND ALLERGIES REVIEWED.    Social History     Tobacco Use    Smoking status: Former     Current packs/day: 0.00     Average packs/day: 1 pack/day for 40.0 years (40.0 ttl pk-yrs)     Types: Cigarettes     Start date:      Quit date:      Years since quittin.5     Passive exposure: Past    Smokeless tobacco: Never   Vaping Use    Vaping status: Never Used   Substance Use Topics    Alcohol use: Not Currently    Drug use: Never       FAMILY AND SOCIAL HISTORY REVIEWED.    Review of Systems   Constitutional:  Positive for fatigue. Negative for chills and fever.   Respiratory:  Positive for shortness of breath. Negative for cough, chest tightness and wheezing.    Cardiovascular:  Negative for chest pain, palpitations and leg swelling.   Skin:  Negative for color change.   Psychiatric/Behavioral:  Negative for sleep disturbance.    All other systems reviewed and are negative.      /70 (BP Location: Left arm, Patient Position: Sitting, Cuff Size: Adult)   Pulse 62   Temp 98.7 °F (37.1 °C)   Resp 16   Ht 177.8 cm (70\")   Wt 70.3 kg (155 lb 1 oz)   SpO2 95% Comment: 4Liters-Pulse at resting  BMI 22.25 kg/m²       There is no immunization history on file for this patient.    Physical Exam  Vitals reviewed.   Constitutional:       General: He is not in acute distress.     Appearance: He is not ill-appearing.      Comments: Elderly gentleman on supplemental oxygen   Cardiovascular:      Rate and Rhythm: Normal rate and regular rhythm.      Pulses: Normal pulses.      " Heart sounds: Normal heart sounds.   Pulmonary:      Effort: Pulmonary effort is normal. No respiratory distress.      Breath sounds: No wheezing, rhonchi or rales.      Comments: Decreased throughout  Skin:     General: Skin is warm and dry.   Neurological:      General: No focal deficit present.      Mental Status: He is alert and oriented to person, place, and time.         RESULTS    PFTs:  None today    Imaging:   CT Chest Without Contrast  Result Date: 7/11/2025  Impression: 1.Stable size of 8 x 6 mm right upper lobe nodule. Recommend continued attention on CT follow-up. 2.Moderate emphysematous changes. Electronically Signed: Megan Galvez MD  7/11/2025 8:49 AM EDT  Workstation ID: [id number]    CT Chest Without Contrast Diagnostic  Result Date: 3/25/2025  Impression: 1. Minimally increased prominence of patient's small spiculated posterior right apical pulmonary nodule, whether due to slight increase in size from 12/10/2024 exam, or whether due to different CT scan slice selection. Appearance remains concerning for a  very small primary pulmonary neoplasm. 2. Stable chronic appearing changes elsewhere. No other new or progressive chest pathology is seen. Electronically Signed: Andrea Etienne MD  3/25/2025 4:28 PM EDT  Workstation ID: [id number]    Cardiac:   Results for orders placed in visit on 04/03/24    Adult Transthoracic Echo Complete W/ Cont if Necessary Per Protocol 04/03/2024  9:17 PM    Interpretation Summary    Left ventricular systolic function is hyperdynamic (EF > 70%). Calculated left ventricular EF = 69% Left ventricular ejection fraction appears to be greater than 70%.    Left ventricular diastolic function is consistent with (grade I) impaired relaxation.    Aortic valve area is 2.8 cm2. grossly normal valve    Peak velocity of the flow distal to the aortic valve is 123.3 cm/s. Aortic valve maximum pressure gradient is 6 mmHg. Aortic valve mean pressure gradient is 3 mmHg.    The mitral valve peak  gradient is 4 mmHg. The mitral valve mean gradient is 2 mmHg. The mitral valve area (PHT) is 3.8 cm2.Normal valves    Estimated right ventricular systolic pressure from tricuspid regurgitation is normal (<35 mmHg). Calculated right ventricular systolic pressure from tricuspid regurgitation is 14 mmHg.    The aortic root measures 2.6 cm.    Normal RV size and function    No pericardial effusion    Limited parasternal windows       PROBLEM LIST    Problem List Items Addressed This Visit       Former smoker (Stopped in 2013)    Chronic respiratory failure with hypoxia and hypercapnia    Abnormal chest CT (Nodules, right hilar LN)    Relevant Orders    CT Chest Without Contrast    Stage IV (Very severe) Emphysema - Primary    Relevant Medications    revefenacin (YUPELRI) 175 MCG/3ML nebulizer solution     DISCUSSION    Patient was last seen in the office by Dr. Nguyen in April 2025.  At that time CT imaging noted to have slight interval enlargement of the RUL pulmonary nodule still too small to consider PET imaging.  He is not a biopsy nor surgical candidate. He was also treated with a steroid taper for an acute exacerbation.    He underwent serial CT imaging as above which continues to show stability of this lesion.    Contacted our office yesterday and requested to be seen as he was hospitalized at [facility] for evaluation of altered mental status noted to be hypercarbic with a PCO2 of greater than 100.  He was treated for an acute COPD exacerbation with antibiotics/steroids and refused BiPAP therapy.  He has a DNI/DNR.  Declined hospice during that admission.    Remains on a formoterol, budesonide, and Yupelri nebulizers.  Also on DuoNebs as needed and suppressive azithromycin.    Respiratory status has remained stable.    He has gained a little bit of weight since his last evaluation.    COPD   Chronic mixed respiratory failure  Patient has very severe [facility].  I am unable to view  any PFTs on file.  [facility] declines repeat PFTs at this time.  Currently seems to be doing okay from a pulmonary standpoint.  No exacerbations since his spring hospitalization.  Will continue his Brovana, Yupelri, and Pulmicort nebulizers. Continue DuoNebs on an as-needed basis.  Continue suppressive azithromycin  If he continues to have exacerbations despite this regimen, may consider adding Dupixent or Nucala to his regimen.  During his [facility] 2025 hospitalization he was noted to be hypercarbic with a PCO2 greater than 100.  BiPAP therapy was recommended, however he declined.  He is a DNI/DNR.  Discussed consideration of adding BiPAP versus trilogy device nocturnally and he declines.  Evidently he was on this before and had a lot of difficulty with these devices.  Educated on importance of remaining up to date on vaccinations   Continue supplemental oxygen titrate to maintain SPO2 88-92%.  Evidently the patient will titrate his oxygen up to 5-6 L based on symptoms and did not routinely check his SpO2.  If his SpO2 is sufficient, it is possible this also contributed to worsening hypercarbia and he was educated on this at today's visit. SpO2 today is 95% on 4L NC.   His weight has stabilized since his last visit. His BMI is 22.25 compared to 19.66.  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  Discussed ways to optimize his nutritional status at today's visit.    RUL pulmonary nodule  Former smoker   Patient is a former smoker with a 40-pack-year history who quit in 2013.  CT imaging has demonstrated a spiculated RUL nodule with possible slight interval increase on the March 2025 image however this may be due to slice thickness variation.  Recently underwent CT imaging at [facility] and this was measured at 7 mm which is smaller than the March 2025 study.  Repeat CT as above and this lesion remained stable.  Will follow-up in 6 months for further evaluation.  Given the severity of his  COPD, he is not a candidate for biopsy nor surgical intervention.  Functional status will be too poor for any type of chemo/immunotherapy.  Discussed consideration of CyberKnife and given how difficult it is for him to go to routine doctor appointments, he is not sure he would even want to pursue this.  He will qualify for annual low-dose cancer screenings through 2028.    Return to clinic in January after his CT of the chest with Dr. Nguyen or may see me sooner if needed.    Moderate level of Medical Decision Making complexity based on 2 or more chronic stable illnesses and prescription drug management.    Electronically signed by BILLIE Denson, 07/21/25, 2:48 PM EDT.    Please note that portions of this note were completed with a voice recognition program.      CC: Betito Bowen MD

## 2025-07-25 DIAGNOSIS — J44.9 COPD MIXED TYPE: ICD-10-CM

## 2025-07-25 NOTE — TELEPHONE ENCOUNTER
Pharmacy faxed over request for Yupelri medical necessity. Original RX resent in with DX code to pharmacy.

## 2025-08-04 ENCOUNTER — PATIENT OUTREACH (OUTPATIENT)
Dept: CASE MANAGEMENT | Facility: OTHER | Age: 70
End: 2025-08-04
Payer: MEDICARE

## 2025-08-04 DIAGNOSIS — Z79.4 TYPE 2 DIABETES MELLITUS WITH HYPERGLYCEMIA, WITH LONG-TERM CURRENT USE OF INSULIN: Primary | ICD-10-CM

## 2025-08-04 DIAGNOSIS — E11.65 TYPE 2 DIABETES MELLITUS WITH HYPERGLYCEMIA, WITH LONG-TERM CURRENT USE OF INSULIN: Primary | ICD-10-CM

## 2025-08-20 RX ORDER — ALBUTEROL SULFATE 90 UG/1
2 INHALANT RESPIRATORY (INHALATION) EVERY 4 HOURS PRN
Qty: 8.5 G | Refills: 0 | Status: SHIPPED | OUTPATIENT
Start: 2025-08-20

## 2025-08-28 RX ORDER — BUDESONIDE 0.5 MG/2ML
INHALANT ORAL
Qty: 360 ML | Refills: 11 | Status: SHIPPED | OUTPATIENT
Start: 2025-08-28

## 2025-08-28 RX ORDER — FORMOTEROL FUMARATE DIHYDRATE 20 UG/2ML
SOLUTION RESPIRATORY (INHALATION)
Qty: 360 ML | Refills: 11 | Status: SHIPPED | OUTPATIENT
Start: 2025-08-28

## 2025-08-28 RX ORDER — IPRATROPIUM BROMIDE AND ALBUTEROL SULFATE 2.5; .5 MG/3ML; MG/3ML
SOLUTION RESPIRATORY (INHALATION)
Qty: 360 ML | Refills: 11 | Status: SHIPPED | OUTPATIENT
Start: 2025-08-28